# Patient Record
Sex: MALE | Race: WHITE | NOT HISPANIC OR LATINO | Employment: OTHER | URBAN - NONMETROPOLITAN AREA
[De-identification: names, ages, dates, MRNs, and addresses within clinical notes are randomized per-mention and may not be internally consistent; named-entity substitution may affect disease eponyms.]

---

## 2017-02-03 RX ORDER — GABAPENTIN 100 MG/1
CAPSULE ORAL
Qty: 90 CAP | Refills: 3 | Status: SHIPPED | OUTPATIENT
Start: 2017-02-03 | End: 2017-12-11 | Stop reason: SDUPTHER

## 2017-03-24 ENCOUNTER — OFFICE VISIT (OUTPATIENT)
Dept: MEDICAL GROUP | Facility: PHYSICIAN GROUP | Age: 62
End: 2017-03-24
Payer: COMMERCIAL

## 2017-03-24 VITALS
WEIGHT: 203 LBS | HEIGHT: 69 IN | TEMPERATURE: 97.9 F | OXYGEN SATURATION: 97 % | BODY MASS INDEX: 30.07 KG/M2 | SYSTOLIC BLOOD PRESSURE: 114 MMHG | HEART RATE: 72 BPM | RESPIRATION RATE: 14 BRPM | DIASTOLIC BLOOD PRESSURE: 76 MMHG

## 2017-03-24 DIAGNOSIS — F17.200 SMOKER: ICD-10-CM

## 2017-03-24 DIAGNOSIS — F17.200 TOBACCO USE DISORDER: ICD-10-CM

## 2017-03-24 DIAGNOSIS — M54.2 NECK PAIN: ICD-10-CM

## 2017-03-24 PROCEDURE — 99213 OFFICE O/P EST LOW 20 MIN: CPT | Performed by: NURSE PRACTITIONER

## 2017-03-24 RX ORDER — HYDROCODONE BITARTRATE AND ACETAMINOPHEN 5; 325 MG/1; MG/1
1 TABLET ORAL EVERY 6 HOURS PRN
Qty: 45 TAB | Refills: 0 | Status: SHIPPED | OUTPATIENT
Start: 2017-03-24 | End: 2017-10-30

## 2017-03-24 NOTE — ASSESSMENT & PLAN NOTE
Patient now has stopped smoking with the use Chantix. Then he backslided, and now wanting to have new RX.

## 2017-03-24 NOTE — MR AVS SNAPSHOT
"Mazin Wing   3/24/2017 2:40 PM   Office Visit   MRN: [id number]    Department:  [facility]   Dept Phone:  [phone]    Description:  Male : 1955   Provider:  ANALI Strange           Reason for Visit     Neck Pain radiating down left arm into elbow and hand until he sees Dr. Fernández      Allergies as of 3/24/2017     Allergen Noted Reactions    Nkda [No Known Drug Allergy] 2009       Other Environmental 2009       pollen      You were diagnosed with     Neck pain   [447813]       Smoker   [351306]       Tobacco use disorder   [305.1.ICD-9-CM]   Uncontrolled, referred to lung CT program      Vital Signs     Blood Pressure Pulse Temperature Respirations Height Weight    114/76 mmHg 72 36.6 °C (97.9 °F) 14 1.753 m (5' 9\") 92.08 kg (203 lb)    Body Mass Index Oxygen Saturation Smoking Status             29.96 kg/m2 97% Current Some Day Smoker         Basic Information     Date Of Birth Sex Race Ethnicity Preferred Language    1955 Male White Non- English      Problem List              ICD-10-CM Priority Class Noted - Resolved    Mixed hyperlipidemia E78.2   2009 - Present    Essential hypertension, benign I10   Unknown - Present    Osteoarthritis of multiple joints M15.9   Unknown - Present    Contact dermatitis and other eczema, due to unspecified cause L25.9   Unknown - Present    Esophageal reflux K21.9   Unknown - Present    Carpal tunnel syndrome G56.00   Unknown - Present    Loss of height R29.890   Unknown - Present    Tobacco use disorder F17.200   2009 - Present    Vitamin D deficiency (Chronic) E55.9   3/8/2010 - Present    Right ankle swelling M25.471   2014 - Present    Edema extremities R60.0   2014 - Present    Seborrheic keratoses, inflamed L82.0   2014 - Present    Osteoarthrosis involving lower leg M17.9   9/15/2014 - Present    BMI 32.0-32.9,adult Z68.32   2015 - Present    Visual disturbances H53.9   2015 - " Present    Peripheral neuropathy (CMS-HCC) G62.9   7/9/2015 - Present    Smoker F17.200   8/11/2015 - Present    Spells R68.89   8/31/2015 - Present    Nonintractable migraine G43.009   8/31/2015 - Present    Unsteady gait R26.81   8/31/2015 - Present    Right elbow tendinitis M77.8   2/29/2016 - Present    Neck pain M54.2   5/4/2016 - Present    Hair loss L65.9   9/29/2016 - Present    Abnormal chest x-ray with multiple lung nodules R91.8   10/10/2016 - Present    Pulmonary nodules R91.8   11/1/2016 - Present      Health Maintenance        Date Due Completion Dates    IMM PNEUMOCOCCAL 19-64 (ADULT) MEDIUM RISK SERIES (1 of 1 - PPSV23) 7/9/1974 ---    IMM ZOSTER VACCINE 7/9/2015 ---    IMM INFLUENZA (1) 9/1/2016 1/6/2014    IMM DTaP/Tdap/Td Vaccine (2 - Td) 5/16/2021 5/16/2011    COLONOSCOPY 7/20/2022 7/20/2012 (Prv Comp)    Override on 7/20/2012: Previously completed (multiple polyps, needs follow up in 1 year)            Current Immunizations     Influenza TIV (IM) 1/6/2014    Tdap Vaccine 5/16/2011  1:21 PM    Tetanus Vaccine 1/1/2001      Below and/or attached are the medications your provider expects you to take. Review all of your home medications and newly ordered medications with your provider and/or pharmacist. Follow medication instructions as directed by your provider and/or pharmacist. Please keep your medication list with you and share with your provider. Update the information when medications are discontinued, doses are changed, or new medications (including over-the-counter products) are added; and carry medication information at all times in the event of emergency situations     Allergies:  NKDA - (reactions not documented)     OTHER ENVIRONMENTAL - (reactions not documented)               Medications  Valid as of: March 24, 2017 -  3:00 PM    Generic Name Brand Name Tablet Size Instructions for use    Cholecalciferol (Cap) Cholecalciferol 2000 UNIT Take 3 Caps by mouth every bedtime. For vitamin D  deficiency        Clobetasol Propionate (Ointment) TEMOVATE 0.05 % APPLY TO AFFECTED AREA(S) 2 TIMES A DAY AS NEEDED. APPLY TO KNEES AS NEEDED        Diclofenac Sodium (Gel) Diclofenac Sodium 1 % Apply  to skin as directed. Apply twice a day as needed.        Diclofenac Sodium (Tablet Delayed Response) VOLTAREN 75 MG Take 1 Tab by mouth 2 times a day as needed.        Divalproex Sodium (TABLET SR 24 HR) DEPAKOTE  MG TAKE 1 TAB BY MOUTH 2 TIMES A DAY.        Gabapentin (Cap) NEURONTIN 100 MG TAKE 1 CAP BY MOUTH EVERY BEDTIME.        Hydrocodone-Acetaminophen (Tab) NORCO 5-325 MG Take 1-2 Tabs by mouth every 6 hours as needed.        Hydrocodone-Acetaminophen (Tab) NORCO 5-325 MG Take 1 Tab by mouth every 6 hours as needed (neck , arm pain).        Mupirocin (Ointment) BACTROBAN 2 % Apply 1 Application to affected area(s) 2 times a day.        RaNITidine HCl (Tab) ZANTAC 300 MG TAKE 1 TABLET BY MOUTH 2 TIMES A DAY.        Tamsulosin HCl (Cap) FLOMAX 0.4 MG Take 0.4 mg by mouth ONE-HALF HOUR AFTER BREAKFAST.        Varenicline Tartrate (Misc) CHANTIX JERI 0.5 MG X 11 & 1 MG X 42 Use as directed.        .                 Medicines prescribed today were sent to:     [facility] #[id number] - [place], [place] - [address] [address]    Phone: [phone] Fax: [phone]    Open 24 Hours?: No      Medication refill instructions:       If your prescription bottle indicates you have medication refills left, it is not necessary to call your provider’s office. Please contact your pharmacy and they will refill your medication.    If your prescription bottle indicates you do not have any refills left, you may request refills at any time through one of the following ways: The online Partigi system (except Urgent Care), by calling your provider’s office, or by asking your pharmacy to contact your provider’s office with a refill request. Medication refills are processed only during regular business  hours and may not be available until the next business day. Your provider may request additional information or to have a follow-up visit with you prior to refilling your medication.   *Please Note: Medication refills are assigned a new Rx number when refilled electronically. Your pharmacy may indicate that no refills were authorized even though a new prescription for the same medication is available at the pharmacy. Please request the medicine by name with the pharmacy before contacting your provider for a refill.        Other Notes About Your Plan     REMIND to ride bike.  Cervical radiculopathy orthopedics           EoeMobile Access Code: [email or web address]  Expires: 4/23/2017  3:00 PM    EoeMobile  A secure, online tool to manage your health information     [employer]’s EoeMobile® is a secure, online tool that connects you to your personalized health information from the privacy of your home -- day or night - making it very easy for you to manage your healthcare. Once the activation process is completed, you can even access your medical information using the EoeMobile rogelio, which is available for free in the Apple Rogelio store or Google Play store.     EoeMobile provides the following levels of access (as shown below):   My Chart Features   [facility] Doctor [facility]  Specialists [facility]  Urgent  Care Non-[facility]  Primary Care  Doctor   Email your healthcare team securely and privately 24/7 X X X    Manage appointments: schedule your next appointment; view details of past/upcoming appointments X      Request prescription refills. X      View recent personal medical records, including lab and immunizations X X X X   View health record, including health history, allergies, medications X X X X   Read reports about your outpatient visits, procedures, consult and ER notes X X X X   See your discharge summary, which is a recap of your hospital and/or ER visit that includes your diagnosis, lab results, and care plan. X X        How to register for Mybandstock:  1. Go to  [email or web address].  2. Click on the Sign Up Now box, which takes you to the New Member Sign Up page. You will need to provide the following information:  a. Enter your Mybandstock Access Code exactly as it appears at the top of this page. (You will not need to use this code after you’ve completed the sign-up process. If you do not sign up before the expiration date, you must request a new code.)   b. Enter your date of birth.   c. Enter your home email address.   d. Click Submit, and follow the next screen’s instructions.  3. Create a Mybandstock ID. This will be your Mybandstock login ID and cannot be changed, so think of one that is secure and easy to remember.  4. Create a Mybandstock password. You can change your password at any time.  5. Enter your Password Reset Question and Answer. This can be used at a later time if you forget your password.   6. Enter your e-mail address. This allows you to receive e-mail notifications when new information is available in Mybandstock.  7. Click Sign Up. You can now view your health information.    For assistance activating your Mybandstock account, call [phone]        Quit Tobacco Information     Do you want to quit using tobacco?    Quitting tobacco decreases risks of cancer, heart and lung disease, increases life expectancy, improves sense of taste and smell, and increases spending money, among other benefits.    If you are thinking about quitting, we can help.  • [facility] Quit Tobacco Program: [phone]  o Program occurs weekly for four weeks and includes pharmacist consultation on products to support quitting smoking or chewing tobacco. A provider referral is needed for pharmacist consultation.  • Tobacco Users Help Hotline: 4-338-QUIT-NOW (491-2899) or https://[place].quitlogix.org/  o Free, confidential telephone and online coaching for [place] residents. Sessions are designed on a schedule that is convenient for you. Eligible  clients receive free nicotine replacement therapy.  • Nationally: www.smokefree.gov  o Information and professional assistance to support both immediate and long-term needs as you become, and remain, a non-smoker. Smokefree.gov allows you to choose the help that best fits your needs.

## 2017-03-24 NOTE — ASSESSMENT & PLAN NOTE
This is a 61 year old with chronic pain to the neck. He was seeing [facility], then went to pain management where he got trigger point injections. He decided to see [facility] and has appointment in April 7, 2017. He his asking for pain medication to cover him until he has appointment. He has taken hydrocodone 5 mg /325 mg in the past. His pain is in his left arm, elbow, wrist. Patient uses voltaren gel for pain, but way over prescribed amount. Tylenol arthritis.

## 2017-03-27 NOTE — PROGRESS NOTES
Chief Complaint   Patient presents with   • Neck Pain     radiating down left arm into elbow and hand until he sees Dr. Fernández       HISTORY OF PRESENT ILLNESS: Patient is a @ age 61 here  today to discuss a ongoing problem. Will make follow up appointment to address other concerns.    Interval history:     Hospitalizations NO     Injuries -/ chronic neck - Pain management MD.    Illness- stressed started smoking again.         Neck pain  This is a 61 year old with chronic pain to the neck. He was seeing [facility], then went to pain management where he got trigger point injections. He decided to see [facility] and has appointment in April 7, 2017. He his asking for pain medication to cover him until he has appointment. He has taken hydrocodone 5 mg /325 mg in the past. His pain is in his left arm, elbow, wrist. Patient uses voltaren gel for pain, but way over prescribed amount. Tylenol arthritis.     Smoker  Patient now has stopped smoking with the use Chantix. Then he backslided, and now wanting to have new RX.         Allergies:Nkda and Other environmental    Current Outpatient Prescriptions Ordered in [facility]   Medication Sig Dispense Refill   • hydrocodone-acetaminophen (NORCO) 5-325 MG Tab per tablet Take 1 Tab by mouth every 6 hours as needed (neck , arm pain). 45 Tab 0   • varenicline (CHANTIX STARTING MONTH PAK) 0.5 MG X 11 & 1 MG X 42 tablet Use as directed. 56 Tab 0   • gabapentin (NEURONTIN) 100 MG Cap TAKE 1 CAP BY MOUTH EVERY BEDTIME. 90 Cap 3   • clobetasol (TEMOVATE) 0.05 % Ointment APPLY TO AFFECTED AREA(S) 2 TIMES A DAY AS NEEDED. APPLY TO KNEES AS NEEDED 60 g 3   • ranitidine (ZANTAC) 300 MG tablet TAKE 1 TABLET BY MOUTH 2 TIMES A DAY. 60 Tab 5   • divalproex ER (DEPAKOTE ER) 500 MG TABLET SR 24 HR TAKE 1 TAB BY MOUTH 2 TIMES A DAY. (Patient taking differently: take 1 tablet by mouth every day) 60 Tab 3   • diclofenac EC (VOLTAREN) 75 MG Tablet Delayed Response Take 1 Tab by mouth 2 times  a day as needed. 60 Tab 0   • Cholecalciferol ([employer]S VITAMIN D) 2000 UNIT CAPS Take 3 Caps by mouth every bedtime. For vitamin D deficiency (Patient taking differently: Take 2,000 Units by mouth every bedtime. For vitamin D deficiency) 60 Cap 11   • hydrocodone-acetaminophen (NORCO) 5-325 MG Tab per tablet Take 1-2 Tabs by mouth every 6 hours as needed. 15 Tab 0   • Diclofenac Sodium 1 % Gel Apply  to skin as directed. Apply twice a day as needed.     • tamsulosin (FLOMAX) 0.4 MG capsule Take 0.4 mg by mouth ONE-HALF HOUR AFTER BREAKFAST.     • mupirocin (BACTROBAN) 2 % OINT Apply 1 Application to affected area(s) 2 times a day. 1 Tube 1     No current Epic-ordered facility-administered medications on file.       Past Medical History   Diagnosis Date   • Mixed hyperlipidemia    • Prediabetes      uncontrolled   • Loss of height      Loss of 1.5 inches 2009,   • Epididymitis      chronic on left   • Tinnitus      chronic with hearing loss   • Contact dermatitis and other eczema, due to unspecified cause      Chronic, controlled with meds   • Carpal tunnel syndrome      bilateral, uncontrolled   • Osteoarthrosis involving, or with mention of more than one site, but not specified as generalized, multiple sites    • Esophageal reflux    • Anesthesia      wakes up agitated   • Arthritis    • Unspecified vitamin D deficiency 3/8/2010   • Snoring    • Edema extremities 8/22/2014   • Seborrheic keratoses, inflamed 8/22/2014   • Cholesterol blood decreased    • Heart burn    • Pain 9/3/14     left knee   • Visual disturbances 1/30/2015   • Chickenpox        Social History   Substance Use Topics   • Smoking status: Current Some Day Smoker -- 1.00 packs/day for 43 years     Types: Cigarettes   • Smokeless tobacco: Never Used      Comment: CHANTIX   • Alcohol Use: No       Family Status   Relation Status Death Age   • Mother Alive      healthy in her 70's   • Father Other      history unknown   • Daughter Alive      Family  "History   Problem Relation Age of Onset   • Cancer Mother    • Lung Cancer Mother    • Lung Disease     • Breast Cancer Daughter        ROS: As documented in my HPI      Exam:  Blood pressure 114/76, pulse 72, temperature 36.6 °C (97.9 °F), resp. rate 14, height 1.753 m (5' 9\"), weight 92.08 kg (203 lb), SpO2 97 %.  General:  Well nourished, well developed male in NAD  Head: Nontender scalp. No lesions  Neck: Decreased ROM left to right. Pain to cervical vertebrae  Pulmonary:  Normal effort.  Cardiovascular: Regular rate   Abdomen: Soft nontender, normal bowel sounds  Extremities: no clubbing, cyanosis, or edema.  Psych: Alert and oriented x3.  Neurological: No focal deficits    Please note that this dictation was created using voice recognition software. I have made every reasonable attempt to correct obvious errors, but I expect that there are errors of grammar and possibly content that I did not discover before finalizing the note.    Assessment/Plan:  1. Neck pain    Current outpatient prescriptions:   •  hydrocodone-acetaminophen, 1 Tab, Oral, Q6HRS PRN  #45   •  gabapentin, TAKE 1 CAP BY MOUTH EVERY BEDTIME., 3   2. Smoker     3. Tobacco use disorder  varenicline (CHANTIX STARTING MONTH PAK) 0.5 MG X 11 & 1 MG X 42 tablet                Patient given RX of Hydrocodone for neck pain - chronic with acute exacerbation. Has appointment with Dr. Fernández.    Return for routine visit and evaluation.  "

## 2017-03-31 ENCOUNTER — TELEPHONE (OUTPATIENT)
Dept: MEDICAL GROUP | Facility: PHYSICIAN GROUP | Age: 62
End: 2017-03-31

## 2017-03-31 DIAGNOSIS — M54.2 NECK PAIN: ICD-10-CM

## 2017-03-31 NOTE — TELEPHONE ENCOUNTER
Last visit patient stated he had appointment with [facility]. He did not request referral. I have place referral.  Marimar Bello  Let him know.  Marimar Bello

## 2017-05-01 ENCOUNTER — PATIENT OUTREACH (OUTPATIENT)
Dept: HEALTH INFORMATION MANAGEMENT | Facility: OTHER | Age: 62
End: 2017-05-01

## 2017-06-06 RX ORDER — VARENICLINE TARTRATE 1 MG/1
1 TABLET, FILM COATED ORAL 2 TIMES DAILY
Qty: 60 TAB | Refills: 1 | Status: SHIPPED | OUTPATIENT
Start: 2017-06-06 | End: 2017-12-11

## 2017-10-16 DIAGNOSIS — K21.9 GASTROESOPHAGEAL REFLUX DISEASE, ESOPHAGITIS PRESENCE NOT SPECIFIED: ICD-10-CM

## 2017-10-18 RX ORDER — RANITIDINE 300 MG/1
TABLET ORAL
Qty: 180 TAB | Refills: 1 | Status: SHIPPED | OUTPATIENT
Start: 2017-10-18 | End: 2017-12-11 | Stop reason: SDUPTHER

## 2017-10-30 ENCOUNTER — OFFICE VISIT (OUTPATIENT)
Dept: URGENT CARE | Facility: PHYSICIAN GROUP | Age: 62
End: 2017-10-30
Payer: COMMERCIAL

## 2017-10-30 VITALS
TEMPERATURE: 98.1 F | WEIGHT: 216 LBS | BODY MASS INDEX: 31.99 KG/M2 | SYSTOLIC BLOOD PRESSURE: 120 MMHG | HEART RATE: 62 BPM | RESPIRATION RATE: 16 BRPM | DIASTOLIC BLOOD PRESSURE: 80 MMHG | HEIGHT: 69 IN | OXYGEN SATURATION: 94 %

## 2017-10-30 DIAGNOSIS — J40 BRONCHITIS: ICD-10-CM

## 2017-10-30 PROCEDURE — 99214 OFFICE O/P EST MOD 30 MIN: CPT | Performed by: PHYSICIAN ASSISTANT

## 2017-10-30 RX ORDER — ALBUTEROL SULFATE 90 UG/1
2 AEROSOL, METERED RESPIRATORY (INHALATION) EVERY 6 HOURS PRN
Qty: 8.5 G | Refills: 1 | Status: SHIPPED | OUTPATIENT
Start: 2017-10-30 | End: 2020-12-17

## 2017-10-30 RX ORDER — IPRATROPIUM BROMIDE AND ALBUTEROL SULFATE 2.5; .5 MG/3ML; MG/3ML
3 SOLUTION RESPIRATORY (INHALATION) ONCE
Status: COMPLETED | OUTPATIENT
Start: 2017-10-30 | End: 2017-10-30

## 2017-10-30 RX ADMIN — IPRATROPIUM BROMIDE AND ALBUTEROL SULFATE 3 ML: 2.5; .5 SOLUTION RESPIRATORY (INHALATION) at 10:28

## 2017-10-30 ASSESSMENT — ENCOUNTER SYMPTOMS
FEVER: 0
SPUTUM PRODUCTION: 1
WHEEZING: 1
SORE THROAT: 0
CHILLS: 0
COUGH: 1
SHORTNESS OF BREATH: 1
HEMOPTYSIS: 0

## 2017-10-30 NOTE — PATIENT INSTRUCTIONS

## 2017-10-30 NOTE — PROGRESS NOTES
Subjective:      Mazin Wing is a 62 y.o. male who presents with Cough (x2wks )            Cough for 2 weeks with some shortness of breath, wheezing, and occasional sputum. No fevers, chills, or other complaints. Patient quit smoking about 3 weeks ago.      Cough   This is a new problem. The current episode started 1 to 4 weeks ago. The problem has been waxing and waning. Associated symptoms include shortness of breath and wheezing. Pertinent negatives include no chills, fever, hemoptysis, nasal congestion or sore throat. Nothing aggravates the symptoms. He has tried nothing for the symptoms. The treatment provided no relief.       Review of Systems   Constitutional: Negative for chills and fever.   HENT: Negative for congestion and sore throat.    Respiratory: Positive for cough, sputum production, shortness of breath and wheezing. Negative for hemoptysis.      Allergies:Erythromycin and Other environmental    Current Outpatient Prescriptions Ordered in [facility]   Medication Sig Dispense Refill   • albuterol 108 (90 Base) MCG/ACT Aero Soln inhalation aerosol Inhale 2 Puffs by mouth every 6 hours as needed for Shortness of Breath. 8.5 g 1   • ranitidine (ZANTAC) 300 MG tablet TAKE 1 TABLET BY MOUTH 2 TIMES A DAY. 180 Tab 1   • varenicline (CHANTIX STARTING MONTH PAK) 0.5 MG X 11 & 1 MG X 42 tablet Use as directed. 56 Tab 0   • gabapentin (NEURONTIN) 100 MG Cap TAKE 1 CAP BY MOUTH EVERY BEDTIME. 90 Cap 3   • clobetasol (TEMOVATE) 0.05 % Ointment APPLY TO AFFECTED AREA(S) 2 TIMES A DAY AS NEEDED. APPLY TO KNEES AS NEEDED 60 g 3   • tamsulosin (FLOMAX) 0.4 MG capsule Take 0.4 mg by mouth ONE-HALF HOUR AFTER BREAKFAST.     • mupirocin (BACTROBAN) 2 % OINT Apply 1 Application to affected area(s) 2 times a day. 1 Tube 1   • Cholecalciferol (CVS VITAMIN D) 2000 UNIT CAPS Take 3 Caps by mouth every bedtime. For vitamin D deficiency (Patient taking differently: Take 2,000 Units by mouth every bedtime. For vitamin D  "deficiency) 60 Cap 11   • varenicline (CHANTIX) 1 MG tablet Take 1 Tab by mouth 2 times a day. 60 Tab 1     No current Epic-ordered facility-administered medications on file.        Past Medical History:   Diagnosis Date   • Anesthesia     wakes up agitated   • Arthritis    • Carpal tunnel syndrome     bilateral, uncontrolled   • Chickenpox    • Cholesterol blood decreased    • Contact dermatitis and other eczema, due to unspecified cause     Chronic, controlled with meds   • Edema extremities 8/22/2014   • Epididymitis     chronic on left   • Esophageal reflux    • Heart burn    • Loss of height     Loss of 1.5 inches 2009,   • Mixed hyperlipidemia    • Osteoarthrosis involving, or with mention of more than one site, but not specified as generalized, multiple sites    • Pain 9/3/14    left knee   • Prediabetes     uncontrolled   • Seborrheic keratoses, inflamed 8/22/2014   • Snoring    • Tinnitus     chronic with hearing loss   • Unspecified vitamin D deficiency 3/8/2010   • Visual disturbances 1/30/2015       Social History   Substance Use Topics   • Smoking status: Former Smoker     Packs/day: 1.00     Years: 43.00     Types: Cigarettes     Quit date: 10/9/2017   • Smokeless tobacco: Never Used      Comment: CHANTIX   • Alcohol use No       Family Status   Relation Status   • Mother Alive    healthy in her 70's   • Father Other    history unknown   • Daughter Alive   •       Family History   Problem Relation Age of Onset   • Cancer Mother    • Lung Cancer Mother    • Breast Cancer Daughter    • Lung Disease            Objective:     /80   Pulse 62   Temp 36.7 °C (98.1 °F)   Resp 16   Ht 1.753 m (5' 9.02\")   Wt 98 kg (216 lb)   SpO2 94%   BMI 31.88 kg/m²      Physical Exam   Constitutional: He is oriented to person, place, and time. He appears well-developed and well-nourished. No distress.   HENT:   Head: Normocephalic and atraumatic.   Right Ear: External ear normal.   Left Ear: External ear normal. "   Mouth/Throat: Oropharynx is clear and moist.   Eyes: Right eye exhibits no discharge. Left eye exhibits no discharge.   Neck: Normal range of motion. Neck supple.   Cardiovascular: Normal rate and regular rhythm.    Pulmonary/Chest: Effort normal. He has no wheezes. He has no rales.   Breath sounds initially mildly diminished throughout, improved with DuoNeb. Occasional nonproductive cough. Patient reports significant improvement in breathing after DuoNeb   Neurological: He is alert and oriented to person, place, and time.   Skin: Skin is warm and dry. He is not diaphoretic.   Psychiatric: He has a normal mood and affect. His behavior is normal. Judgment and thought content normal.   Nursing note and vitals reviewed.              Assessment/Plan:     1. Bronchitis  ipratropium-albuterol (DUONEB) nebulizer solution 3 mL    albuterol 108 (90 Base) MCG/ACT Aero Soln inhalation aerosol    Cough with intermittent shortness of breath, chest tightness, and occasional sputum. Improved with DuoNeb. Given albuterol. Follow-up with PCP       [employer] Patient Education given:Bronchitis    Please note that this dictation was created using voice recognition software. I have made every reasonable attempt to correct obvious errors, but I expect that there are errors of grammar and possibly content that I did not discover before finalizing the note.

## 2017-11-06 ENCOUNTER — APPOINTMENT (OUTPATIENT)
Dept: RADIOLOGY | Facility: IMAGING CENTER | Age: 62
End: 2017-11-06
Attending: NURSE PRACTITIONER
Payer: COMMERCIAL

## 2017-11-06 ENCOUNTER — OFFICE VISIT (OUTPATIENT)
Dept: URGENT CARE | Facility: CLINIC | Age: 62
End: 2017-11-06
Payer: COMMERCIAL

## 2017-11-06 VITALS
OXYGEN SATURATION: 95 % | TEMPERATURE: 98.7 F | SYSTOLIC BLOOD PRESSURE: 122 MMHG | RESPIRATION RATE: 16 BRPM | WEIGHT: 222.2 LBS | DIASTOLIC BLOOD PRESSURE: 66 MMHG | BODY MASS INDEX: 32.91 KG/M2 | HEART RATE: 66 BPM | HEIGHT: 69 IN

## 2017-11-06 DIAGNOSIS — R06.02 SOB (SHORTNESS OF BREATH): ICD-10-CM

## 2017-11-06 DIAGNOSIS — J20.9 ACUTE BRONCHITIS, UNSPECIFIED ORGANISM: ICD-10-CM

## 2017-11-06 DIAGNOSIS — R05.8 PRODUCTIVE COUGH: ICD-10-CM

## 2017-11-06 DIAGNOSIS — R09.81 NASAL CONGESTION WITH RHINORRHEA: ICD-10-CM

## 2017-11-06 DIAGNOSIS — J34.89 NASAL CONGESTION WITH RHINORRHEA: ICD-10-CM

## 2017-11-06 PROCEDURE — 99214 OFFICE O/P EST MOD 30 MIN: CPT | Mod: 25 | Performed by: NURSE PRACTITIONER

## 2017-11-06 PROCEDURE — 94664 DEMO&/EVAL PT USE INHALER: CPT | Mod: 59 | Performed by: NURSE PRACTITIONER

## 2017-11-06 PROCEDURE — 94640 AIRWAY INHALATION TREATMENT: CPT | Performed by: NURSE PRACTITIONER

## 2017-11-06 PROCEDURE — 94760 N-INVAS EAR/PLS OXIMETRY 1: CPT | Performed by: NURSE PRACTITIONER

## 2017-11-06 PROCEDURE — 71020 DX-CHEST-2 VIEWS: CPT | Mod: TC | Performed by: NURSE PRACTITIONER

## 2017-11-06 RX ORDER — DOXYCYCLINE HYCLATE 100 MG
100 TABLET ORAL 2 TIMES DAILY
Qty: 20 TAB | Refills: 0 | Status: SHIPPED | OUTPATIENT
Start: 2017-11-06 | End: 2017-12-11

## 2017-11-06 RX ORDER — METHYLPREDNISOLONE 4 MG/1
TABLET ORAL
Qty: 1 KIT | Refills: 0 | Status: SHIPPED | OUTPATIENT
Start: 2017-11-06 | End: 2017-12-11

## 2017-11-06 RX ORDER — BENZONATATE 100 MG/1
100 CAPSULE ORAL 3 TIMES DAILY PRN
Qty: 60 CAP | Refills: 0 | Status: SHIPPED | OUTPATIENT
Start: 2017-11-06 | End: 2018-02-16

## 2017-11-06 RX ORDER — IPRATROPIUM BROMIDE AND ALBUTEROL SULFATE 2.5; .5 MG/3ML; MG/3ML
3 SOLUTION RESPIRATORY (INHALATION) ONCE
Status: COMPLETED | OUTPATIENT
Start: 2017-11-06 | End: 2017-11-06

## 2017-11-06 RX ORDER — FLUTICASONE PROPIONATE 50 MCG
2 SPRAY, SUSPENSION (ML) NASAL DAILY
Qty: 1 BOTTLE | Refills: 0 | Status: SHIPPED | OUTPATIENT
Start: 2017-11-06 | End: 2020-12-21

## 2017-11-06 RX ADMIN — IPRATROPIUM BROMIDE AND ALBUTEROL SULFATE 3 ML: 2.5; .5 SOLUTION RESPIRATORY (INHALATION) at 14:42

## 2017-11-06 ASSESSMENT — ENCOUNTER SYMPTOMS
BACK PAIN: 0
EYE REDNESS: 0
SPUTUM PRODUCTION: 1
WEAKNESS: 0
COUGH: 1
SORE THROAT: 0
FEVER: 0
VOMITING: 0
EYE DISCHARGE: 0
ABDOMINAL PAIN: 0
DIARRHEA: 0
DIZZINESS: 0
NAUSEA: 0
WHEEZING: 0
SHORTNESS OF BREATH: 1
HEADACHES: 0
ORTHOPNEA: 0
PALPITATIONS: 0
MYALGIAS: 0
CHILLS: 0
CONSTIPATION: 0

## 2017-11-06 NOTE — PROGRESS NOTES
Subjective:      Mazin Wing is a 62 y.o. male who presents with Cough (cough/ nasal congestion X 4 weeks )            HPI  Mazin is a 62 year old male who is here for cough x 4 weeks. Was seen in [facility] 1 week ago for same symptoms. Was given breathing treatment which helped and an albuterol inhaler to go home. Dx: bronchitis. Ex-smoker in last month. Taking Chantix. C/o productive cough with clear to dark yellow phlegm. C/o SOB, chest tightness, no wheeze. C/o nasal congestion with runny nose, PND and dry scratchy throat.     PMH:  has a past medical history of Anesthesia; Arthritis; Carpal tunnel syndrome; Chickenpox; Cholesterol blood decreased; Contact dermatitis and other eczema, due to unspecified cause; Edema extremities (8/22/2014); Epididymitis; Esophageal reflux; Heart burn; Loss of height; Mixed hyperlipidemia; Osteoarthrosis involving, or with mention of more than one site, but not specified as generalized, multiple sites; Pain (9/3/14); Prediabetes; Seborrheic keratoses, inflamed (8/22/2014); Snoring; Tinnitus; Unspecified vitamin D deficiency (3/8/2010); and Visual disturbances (1/30/2015).  MEDS:   Current Outpatient Prescriptions:   •  albuterol 108 (90 Base) MCG/ACT Aero Soln inhalation aerosol, Inhale 2 Puffs by mouth every 6 hours as needed for Shortness of Breath., Disp: 8.5 g, Rfl: 1  •  ranitidine (ZANTAC) 300 MG tablet, TAKE 1 TABLET BY MOUTH 2 TIMES A DAY., Disp: 180 Tab, Rfl: 1  •  varenicline (CHANTIX) 1 MG tablet, Take 1 Tab by mouth 2 times a day., Disp: 60 Tab, Rfl: 1  •  varenicline (CHANTIX STARTING MONTH PAK) 0.5 MG X 11 & 1 MG X 42 tablet, Use as directed., Disp: 56 Tab, Rfl: 0  •  gabapentin (NEURONTIN) 100 MG Cap, TAKE 1 CAP BY MOUTH EVERY BEDTIME., Disp: 90 Cap, Rfl: 3  •  clobetasol (TEMOVATE) 0.05 % Ointment, APPLY TO AFFECTED AREA(S) 2 TIMES A DAY AS NEEDED. APPLY TO KNEES AS NEEDED, Disp: 60 g, Rfl: 3  •  tamsulosin (FLOMAX) 0.4 MG capsule, Take 0.4 mg by mouth  ONE-HALF HOUR AFTER BREAKFAST., Disp: , Rfl:   •  mupirocin (BACTROBAN) 2 % OINT, Apply 1 Application to affected area(s) 2 times a day., Disp: 1 Tube, Rfl: 1  •  Cholecalciferol (CVS VITAMIN D) 2000 UNIT CAPS, Take 3 Caps by mouth every bedtime. For vitamin D deficiency (Patient taking differently: Take 2,000 Units by mouth every bedtime. For vitamin D deficiency), Disp: 60 Cap, Rfl: 11    Current Facility-Administered Medications:   •  ipratropium-albuterol (DUONEB) nebulizer solution 3 mL, 3 mL, Nebulization, Once, Sara Horta, F.N.P.  ALLERGIES:   Allergies   Allergen Reactions   • Erythromycin    • Other Environmental      pollen     SURGHX:   Past Surgical History:   Procedure Laterality Date   • KNEE ARTHROPLASTY TOTAL  9/15/2014    Performed by Cesar Abreu M.D. at SURGERY [facility] ORS   • KNEE ARTHROPLASTY TOTAL  3/7/2011    right   • CARPAL TUNNEL ENDOSCOPIC  12/1/2009    Performed by RONNIE MAURICIO at SURGERY [facility] ORS   • CERVICAL DISK AND FUSION ANTERIOR  1/26/2009    Performed by AMINA NERI at SURGERY [facility] ORS   • ARTHROSCOPY, KNEE  1989    right   • HEMORRHOIDECTOMY     • TOENAIL REMOVAL      bilateral great toes   • VASECTOMY       SOCHX:  reports that he quit smoking about 4 weeks ago. His smoking use included Cigarettes. He has a 43.00 pack-year smoking history. He has never used smokeless tobacco. He reports that he does not drink alcohol or use drugs.  FH: Family history was reviewed, no pertinent findings to report    Review of Systems   Constitutional: Negative for chills, fever and malaise/fatigue.   HENT: Positive for congestion. Negative for ear pain and sore throat.    Eyes: Negative for discharge and redness.   Respiratory: Positive for cough, sputum production and shortness of breath. Negative for wheezing.    Cardiovascular: Negative for chest pain, palpitations and orthopnea.   Gastrointestinal: Negative for abdominal pain, constipation, diarrhea,  "nausea and vomiting.   Musculoskeletal: Negative for back pain and myalgias.   Neurological: Negative for dizziness, weakness and headaches.   Endo/Heme/Allergies: Negative for environmental allergies.   All other systems reviewed and are negative.         Objective:     /66   Pulse 66   Temp 37.1 °C (98.7 °F)   Resp 16   Ht 1.753 m (5' 9\")   Wt 100.8 kg (222 lb 3.2 oz)   SpO2 95%   BMI 32.81 kg/m²      Physical Exam   Constitutional: He is oriented to person, place, and time. He appears well-developed and well-nourished. He is active and cooperative.  Non-toxic appearance. He does not have a sickly appearance. He does not appear ill. No distress.   HENT:   Head: Normocephalic.   Right Ear: External ear and ear canal normal. Tympanic membrane is injected.   Left Ear: External ear and ear canal normal. Tympanic membrane is injected.   Nose: Mucosal edema and rhinorrhea present. No sinus tenderness.   Mouth/Throat: Uvula is midline. Mucous membranes are dry. No uvula swelling. No posterior oropharyngeal edema or posterior oropharyngeal erythema.   Eyes: Conjunctivae and EOM are normal. Pupils are equal, round, and reactive to light.   Neck: Normal range of motion. Neck supple.   Cardiovascular: Normal rate and regular rhythm.    Pulmonary/Chest: Effort normal and breath sounds normal. No accessory muscle usage. No respiratory distress. He has no decreased breath sounds. He has no wheezes. He has no rhonchi. He has no rales.   Musculoskeletal: Normal range of motion.   Neurological: He is alert and oriented to person, place, and time.   Skin: Skin is warm and dry. He is not diaphoretic.   Vitals reviewed.  Duo Neb breathing treatment: Patient has chest tightness without SOB, wheeze or CP. Cough induced especially with deep breathing. After, patient states able to breathe deep without coughing. Air movement throughout. Post tx 98%.    CXR  FINDINGS:  Cardiomediastinal contour is within normal " limits.  Thoracic aorta is tortuous.  Nodular density projects over the periphery of the RIGHT midlung.  No focal pulmonary consolidation.  No pleural fluid collection or pneumothorax.  Degenerative change of thoracic spine.  Prior cervical fusion.               Assessment/Plan:     1. Productive cough    - ipratropium-albuterol (DUONEB) nebulizer solution 3 mL; 3 mL by Nebulization route Once.  - DX-CHEST-2 VIEWS; Future  - benzonatate (TESSALON) 100 MG Cap; Take 1 Cap by mouth 3 times a day as needed for Cough.  Dispense: 60 Cap; Refill: 0    2. SOB (shortness of breath)    - DX-CHEST-2 VIEWS; Future  - MethylPREDNISolone (MEDROL DOSEPAK) 4 MG Tablet Therapy Pack; Use as directed  Dispense: 1 Kit; Refill: 0    3. Nasal congestion with rhinorrhea    - fluticasone (FLONASE) 50 MCG/ACT nasal spray; Spray 2 Sprays in nose every day.  Dispense: 1 Bottle; Refill: 0    4. Acute bronchitis, unspecified organism    - doxycycline (VIBRAMYCIN) 100 MG Tab; Take 1 Tab by mouth 2 times a day.  Dispense: 20 Tab; Refill: 0  - MethylPREDNISolone (MEDROL DOSEPAK) 4 MG Tablet Therapy Pack; Use as directed  Dispense: 1 Kit; Refill: 0  - benzonatate (TESSALON) 100 MG Cap; Take 1 Cap by mouth 3 times a day as needed for Cough.  Dispense: 60 Cap; Refill: 0    Increase water intake  May use Ibuprofen/Tylenol prn for fever or body aches  Get rest  May use daily longer acting antihistamine prn  May use saline nasal spray prn to flush allergens   Use inhaler prn for SOB with cough  Monitor for fevers, productive cough, SOB, CP, chest tightness- need re-evaluation  Follow up with PCP or pulmonologist  (established with Dr. Cazares) prn

## 2017-11-07 ENCOUNTER — TELEPHONE (OUTPATIENT)
Dept: MEDICAL GROUP | Facility: PHYSICIAN GROUP | Age: 62
End: 2017-11-07

## 2017-11-15 DIAGNOSIS — H53.9 VISUAL DISTURBANCES: ICD-10-CM

## 2017-11-15 DIAGNOSIS — G43.001 MIGRAINE WITHOUT AURA AND WITH STATUS MIGRAINOSUS, NOT INTRACTABLE: ICD-10-CM

## 2017-11-16 RX ORDER — METHOCARBAMOL 750 MG/1
TABLET, FILM COATED ORAL
COMMUNITY
Start: 2017-09-23 | End: 2018-02-16

## 2017-11-16 RX ORDER — DIVALPROEX SODIUM 500 MG/1
500 TABLET, EXTENDED RELEASE ORAL 2 TIMES DAILY
Qty: 60 TAB | Refills: 0 | Status: SHIPPED | OUTPATIENT
Start: 2017-11-16 | End: 2017-12-11 | Stop reason: SDUPTHER

## 2017-11-16 RX ORDER — CEPHALEXIN 500 MG/1
CAPSULE ORAL
COMMUNITY
Start: 2017-08-31 | End: 2018-02-16

## 2017-11-16 RX ORDER — OXYCODONE HYDROCHLORIDE 10 MG/1
TABLET ORAL
COMMUNITY
Start: 2017-09-16 | End: 2017-12-11

## 2017-11-16 NOTE — TELEPHONE ENCOUNTER
Marimar- Pt has not been prescribed in some time and was recently d/c'd by urgent care. Possible compliance issues with this med. Please refill as you see fit.

## 2017-12-11 ENCOUNTER — OFFICE VISIT (OUTPATIENT)
Dept: MEDICAL GROUP | Facility: MEDICAL CENTER | Age: 62
End: 2017-12-11
Payer: COMMERCIAL

## 2017-12-11 VITALS
DIASTOLIC BLOOD PRESSURE: 76 MMHG | WEIGHT: 235.23 LBS | TEMPERATURE: 98.9 F | HEIGHT: 69 IN | RESPIRATION RATE: 12 BRPM | OXYGEN SATURATION: 96 % | SYSTOLIC BLOOD PRESSURE: 132 MMHG | HEART RATE: 69 BPM | BODY MASS INDEX: 34.84 KG/M2

## 2017-12-11 DIAGNOSIS — E78.2 MIXED HYPERLIPIDEMIA: ICD-10-CM

## 2017-12-11 DIAGNOSIS — L08.9 SKIN INFECTION: ICD-10-CM

## 2017-12-11 DIAGNOSIS — E66.9 OBESITY (BMI 30-39.9): ICD-10-CM

## 2017-12-11 DIAGNOSIS — E55.9 VITAMIN D DEFICIENCY DISEASE: ICD-10-CM

## 2017-12-11 DIAGNOSIS — H53.9 VISUAL DISTURBANCES: ICD-10-CM

## 2017-12-11 DIAGNOSIS — F17.200 TOBACCO USE DISORDER: ICD-10-CM

## 2017-12-11 DIAGNOSIS — L82.0 SEBORRHEIC KERATOSES, INFLAMED: ICD-10-CM

## 2017-12-11 DIAGNOSIS — I10 ESSENTIAL HYPERTENSION, BENIGN: ICD-10-CM

## 2017-12-11 DIAGNOSIS — K21.9 GASTROESOPHAGEAL REFLUX DISEASE, ESOPHAGITIS PRESENCE NOT SPECIFIED: ICD-10-CM

## 2017-12-11 DIAGNOSIS — G43.001 MIGRAINE WITHOUT AURA AND WITH STATUS MIGRAINOSUS, NOT INTRACTABLE: ICD-10-CM

## 2017-12-11 DIAGNOSIS — G62.9 PERIPHERAL POLYNEUROPATHY: ICD-10-CM

## 2017-12-11 PROCEDURE — 99214 OFFICE O/P EST MOD 30 MIN: CPT | Performed by: FAMILY MEDICINE

## 2017-12-11 RX ORDER — GABAPENTIN 100 MG/1
100 CAPSULE ORAL
Qty: 90 CAP | Refills: 3 | Status: SHIPPED | OUTPATIENT
Start: 2017-12-11 | End: 2018-12-28 | Stop reason: SDUPTHER

## 2017-12-11 RX ORDER — DIVALPROEX SODIUM 500 MG/1
500 TABLET, EXTENDED RELEASE ORAL 2 TIMES DAILY
Qty: 180 TAB | Refills: 3 | Status: SHIPPED | OUTPATIENT
Start: 2017-12-11 | End: 2018-12-15 | Stop reason: SDUPTHER

## 2017-12-11 RX ORDER — RANITIDINE 300 MG/1
TABLET ORAL
Qty: 180 TAB | Refills: 3 | Status: SHIPPED | OUTPATIENT
Start: 2017-12-11 | End: 2019-01-05 | Stop reason: SDUPTHER

## 2017-12-11 RX ORDER — CLOBETASOL PROPIONATE 0.5 MG/G
OINTMENT TOPICAL
Qty: 60 G | Refills: 3 | Status: SHIPPED | OUTPATIENT
Start: 2017-12-11 | End: 2019-09-16 | Stop reason: SDUPTHER

## 2017-12-11 ASSESSMENT — PATIENT HEALTH QUESTIONNAIRE - PHQ9: CLINICAL INTERPRETATION OF PHQ2 SCORE: 0

## 2017-12-11 NOTE — ASSESSMENT & PLAN NOTE
This is a chronic health problem that is uncontrolled with current medications and lifestyle measures. Patient is not on medication and will get blood work.

## 2017-12-11 NOTE — ASSESSMENT & PLAN NOTE
This is a chronic health issue for this patient that is now resolved with him utilizing Depakote on a twice a day basis. Patient will continue on this long-term. Prescription was written today.

## 2017-12-11 NOTE — ASSESSMENT & PLAN NOTE
This was a chronic issue for this patient but he has quit once again since the third week in September 2017. He has not smoked since that time. Luckily, he saw a friend who is very ill and continuing to smoke and contributing to his own illness. This may quite an impression on the patient.

## 2017-12-11 NOTE — ASSESSMENT & PLAN NOTE
This is a chronic health problem that is uncontrolled with current medications and lifestyle measures.  This is a chronic health issue for this patient to have gotten much worse because he has quit smoking. When he quit smoking he started utilizing peppermints for the hand to mouth syndrome. He is now cutting back on those. He has gained approximately 20 pounds. He is already started working on changing his dietary habits.

## 2017-12-11 NOTE — PROGRESS NOTES
CC: Follow-up from recent urgent care visit treated for bronchitis exacerbation  HISTORY OF PRESENT ILLNESS: Patient is a 62 y.o. male established patient who presents today to  follow-up on chronic health problems listed below and recent urgent care bronchitis exacerbation      Health Maintenance: Completed    Obesity (BMI 30-39.9)  This is a chronic health problem that is uncontrolled with current medications and lifestyle measures.  This is a chronic health issue for this patient to have gotten much worse because he has quit smoking. When he quit smoking he started utilizing peppermints for the hand to mouth syndrome. He is now cutting back on those. He has gained approximately 20 pounds. He is already started working on changing his dietary habits.    Tobacco use disorder  This was a chronic issue for this patient but he has quit once again since the third week in September 2017. He has not smoked since that time. Luckily, he saw a friend who is very ill and continuing to smoke and contributing to his own illness. This may quite an impression on the patient.    Visual disturbances  This is a chronic health issue for this patient that is now resolved with him utilizing Depakote on a twice a day basis. Patient will continue on this long-term. Prescription was written today.    Essential hypertension, benign  This is a chronic health problem that is well controlled with current medications and lifestyle measures. His BP today is good despite his weight gain.  His BP is good at 132/76.  We will check blood work and make sure his numbers are staying okay.    Mixed hyperlipidemia  This is a chronic health problem that is uncontrolled with current medications and lifestyle measures. Patient is not on medication and will get blood work.    Peripheral neuropathy  This is a chronic health problem for this patient which is adequately controlled with current dosing of gabapentin. He will continue that  long-term.      Patient Active Problem List    Diagnosis Date Noted   • Obesity (BMI 30-39.9) 12/11/2017   • Pulmonary nodules 11/01/2016   • Abnormal chest x-ray with multiple lung nodules 10/10/2016   • Hair loss 09/29/2016   • Neck pain 05/04/2016   • Right elbow tendinitis 02/29/2016   • Spells 08/31/2015   • Nonintractable migraine 08/31/2015   • Unsteady gait 08/31/2015   • Peripheral neuropathy (CMS-[facility]) 07/09/2015   • BMI 32.0-32.9,adult 01/30/2015   • Visual disturbances 01/30/2015   • Edema extremities 08/22/2014   • Seborrheic keratoses, inflamed 08/22/2014   • Right ankle swelling 04/02/2014   • Vitamin D deficiency 03/08/2010   • Tobacco use disorder 11/06/2009   • Essential hypertension, benign    • Osteoarthritis of multiple joints    • Contact dermatitis and other eczema, due to unspecified cause    • Esophageal reflux    • Carpal tunnel syndrome    • Loss of height    • Mixed hyperlipidemia 06/05/2009      Allergies:Erythromycin and Other environmental    Current Outpatient Prescriptions   Medication Sig Dispense Refill   • Cholecalciferol ([employer]S VITAMIN D) 2000 UNIT Cap Take 1 Cap by mouth every bedtime. For vitamin D deficiency 100 Cap 3   • clobetasol (TEMOVATE) 0.05 % Ointment APPLY TO AFFECTED AREA(S) 2 TIMES A DAY AS NEEDED. APPLY TO KNEES AS NEEDED 60 g 3   • divalproex ER (DEPAKOTE ER) 500 MG TABLET SR 24 HR Take 1 Tab by mouth 2 Times a Day. 180 Tab 3   • gabapentin (NEURONTIN) 100 MG Cap Take 1 Cap by mouth every bedtime. 90 Cap 3   • mupirocin (BACTROBAN) 2 % Ointment Apply 1 Application to affected area(s) 2 times a day. 1 Tube 3   • ranitidine (ZANTAC) 300 MG tablet TAKE 1 TABLET BY MOUTH 2 TIMES A DAY. 180 Tab 3   • albuterol 108 (90 Base) MCG/ACT Aero Soln inhalation aerosol Inhale 2 Puffs by mouth every 6 hours as needed for Shortness of Breath. 8.5 g 1   • tamsulosin (FLOMAX) 0.4 MG capsule Take 0.4 mg by mouth ONE-HALF HOUR AFTER BREAKFAST.     • cephALEXin (KEFLEX) 500 MG Cap      •  methocarbamol (ROBAXIN) 750 MG Tab      • fluticasone (FLONASE) 50 MCG/ACT nasal spray Spray 2 Sprays in nose every day. 1 Bottle 0   • benzonatate (TESSALON) 100 MG Cap Take 1 Cap by mouth 3 times a day as needed for Cough. 60 Cap 0     No current facility-administered medications for this visit.        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     - HEENT: Negative for headaches, vision changes, hearing changes, ear pain, ear discharge, rhinorrhea, sinus congestion, sore throat, and neck pain.      - Respiratory: Negative for cough, sputum production, chest congestion, dyspnea, wheezing, and crackles.      - Cardiovascular: Negative for chest pain, palpitations, orthopnea, and bilateral lower extremity edema.     - Gastrointestinal: Negative for heartburn, nausea, vomiting, abdominal pain, hematochezia, melena, diarrhea, constipation, and greasy/foul-smelling stools.     - Genitourinary: Negative for dysuria, polyuria, hematuria, pyuria, urinary urgency, and urinary incontinence.    - Musculoskeletal: Negative for myalgias, back pain, and joint pain.     - Skin: Negative for rash, itching, cyanotic skin color change.     - Neurological: Negative for dizziness, tingling, tremors, focal sensory deficit, focal weakness and headaches.     - Endo/Heme/Allergies: Does not bruise/bleed easily.     - Psychiatric/Behavioral: Negative for depression, suicidal/homicidal  "ideation and memory loss.        - NOTE: All other systems reviewed and are negative, except as in HPI.      Exam:    Blood pressure 132/76, pulse 69, temperature 37.2 °C (98.9 °F), resp. rate 12, height 1.753 m (5' 9\"), weight 106.7 kg (235 lb 3.7 oz), SpO2 96 %. Body mass index is 34.74 kg/m².    General:  Well nourished, well developed male in NAD  Head is grossly normal.  Neck: Supple without JVD or bruit. Thyroid is not enlarged.  Pulmonary: Clear to ausculation and percussion.  Normal effort. No rales, ronchi, or wheezing.  Cardiovascular: Regular rate and rhythm without murmur. Carotid and radial pulses are intact and equal bilaterally.  Extremities: no clubbing, cyanosis, or edema.    Please note that this dictation was created using voice recognition software. I have made every reasonable attempt to correct obvious errors, but I expect that there are errors of grammar and possibly content that I did not discover before finalizing the note.    Assessment/Plan:  1. Obesity (BMI 30-39.9)  Recently uncontrolled but patient now cutting back on food intake. Will monitor over 3 months  - Patient identified as having weight management issue.  Appropriate orders and counseling given.    2. Vitamin D deficiency disease  Unknown control, patient will get blood work in 3 months we will see him back  - Cholecalciferol (CVS VITAMIN D) 2000 UNIT Cap; Take 1 Cap by mouth every bedtime. For vitamin D deficiency  Dispense: 100 Cap; Refill: 3    3. Seborrheic keratoses, inflamed  Stable and controlled with current creams. We will get new prescriptions today  - clobetasol (TEMOVATE) 0.05 % Ointment; APPLY TO AFFECTED AREA(S) 2 TIMES A DAY AS NEEDED. APPLY TO KNEES AS NEEDED  Dispense: 60 g; Refill: 3    4. Visual disturbances  Stable, continue with Depakote  - divalproex ER (DEPAKOTE ER) 500 MG TABLET SR 24 HR; Take 1 Tab by mouth 2 Times a Day.  Dispense: 180 Tab; Refill: 3    5. Migraine without aura and with status " migrainosus, not intractable  Stable, continue with Depakote  - divalproex ER (DEPAKOTE ER) 500 MG TABLET SR 24 HR; Take 1 Tab by mouth 2 Times a Day.  Dispense: 180 Tab; Refill: 3    6. Skin infection  Stable, continue with Bactroban as needed  - mupirocin (BACTROBAN) 2 % Ointment; Apply 1 Application to affected area(s) 2 times a day.  Dispense: 1 Tube; Refill: 3    7. Gastroesophageal reflux disease, esophagitis presence not specified  Stable, continue with ranitidine as needed  - ranitidine (ZANTAC) 300 MG tablet; TAKE 1 TABLET BY MOUTH 2 TIMES A DAY.  Dispense: 180 Tab; Refill: 3    8. Tobacco use disorder  Patient now having quit in October and has remained off of tobacco. States that he is ready to stay off continually    9. Essential hypertension, benign  Controlled, continue with current meds and lifestyle.      10. Mixed hyperlipidemia  Unknown control, patient needs blood work and we will see him back to go over those results  - COMP METABOLIC PANEL; Future  - LIPID PROFILE; Future  - TSH; Future  - VITAMIN D,25 HYDROXY; Future    11. Peripheral polyneuropathy (CMS-HCC)  Controlled, continue with current meds and lifestyle.

## 2017-12-11 NOTE — ASSESSMENT & PLAN NOTE
This is a chronic health problem for this patient which is adequately controlled with current dosing of gabapentin. He will continue that long-term.

## 2017-12-11 NOTE — ASSESSMENT & PLAN NOTE
This is a chronic health problem that is well controlled with current medications and lifestyle measures. His BP today is good despite his weight gain.  His BP is good at 132/76.  We will check blood work and make sure his numbers are staying okay.

## 2017-12-19 ENCOUNTER — TELEPHONE (OUTPATIENT)
Dept: MEDICAL GROUP | Facility: MEDICAL CENTER | Age: 62
End: 2017-12-19

## 2017-12-19 DIAGNOSIS — L08.9 SKIN INFECTION: ICD-10-CM

## 2017-12-19 NOTE — TELEPHONE ENCOUNTER
I sent over a new prescription for Bactroban dispense as written ointment and sent to the pharmacy.

## 2017-12-19 NOTE — TELEPHONE ENCOUNTER
[facility] sent over a clarification fax regarding patient ointment. Pharmacy states it came over as Mupirocin 2% and the patient stated that it is for Bactroban. Please advise.

## 2018-01-06 ENCOUNTER — HOSPITAL ENCOUNTER (OUTPATIENT)
Dept: LAB | Facility: MEDICAL CENTER | Age: 63
End: 2018-01-06
Attending: FAMILY MEDICINE
Payer: COMMERCIAL

## 2018-01-06 DIAGNOSIS — E78.2 MIXED HYPERLIPIDEMIA: ICD-10-CM

## 2018-01-06 LAB
25(OH)D3 SERPL-MCNC: 35 NG/ML (ref 30–100)
ALBUMIN SERPL BCP-MCNC: 4.2 G/DL (ref 3.2–4.9)
ALBUMIN/GLOB SERPL: 1.4 G/DL
ALP SERPL-CCNC: 65 U/L (ref 30–99)
ALT SERPL-CCNC: 24 U/L (ref 2–50)
ANION GAP SERPL CALC-SCNC: 8 MMOL/L (ref 0–11.9)
AST SERPL-CCNC: 20 U/L (ref 12–45)
BILIRUB SERPL-MCNC: 0.7 MG/DL (ref 0.1–1.5)
BUN SERPL-MCNC: 20 MG/DL (ref 8–22)
CALCIUM SERPL-MCNC: 9.1 MG/DL (ref 8.5–10.5)
CHLORIDE SERPL-SCNC: 107 MMOL/L (ref 96–112)
CHOLEST SERPL-MCNC: 169 MG/DL (ref 100–199)
CO2 SERPL-SCNC: 25 MMOL/L (ref 20–33)
CREAT SERPL-MCNC: 0.82 MG/DL (ref 0.5–1.4)
GFR SERPL CREATININE-BSD FRML MDRD: >60 ML/MIN/1.73 M 2
GLOBULIN SER CALC-MCNC: 2.9 G/DL (ref 1.9–3.5)
GLUCOSE SERPL-MCNC: 103 MG/DL (ref 65–99)
HDLC SERPL-MCNC: 43 MG/DL
LDLC SERPL CALC-MCNC: 108 MG/DL
POTASSIUM SERPL-SCNC: 4.3 MMOL/L (ref 3.6–5.5)
PROT SERPL-MCNC: 7.1 G/DL (ref 6–8.2)
SODIUM SERPL-SCNC: 140 MMOL/L (ref 135–145)
TRIGL SERPL-MCNC: 91 MG/DL (ref 0–149)
TSH SERPL DL<=0.005 MIU/L-ACNC: 2.84 UIU/ML (ref 0.38–5.33)

## 2018-01-06 PROCEDURE — 80053 COMPREHEN METABOLIC PANEL: CPT

## 2018-01-06 PROCEDURE — 82306 VITAMIN D 25 HYDROXY: CPT

## 2018-01-06 PROCEDURE — 36415 COLL VENOUS BLD VENIPUNCTURE: CPT

## 2018-01-06 PROCEDURE — 80061 LIPID PANEL: CPT

## 2018-01-06 PROCEDURE — 84443 ASSAY THYROID STIM HORMONE: CPT

## 2018-01-09 ENCOUNTER — HOSPITAL ENCOUNTER (OUTPATIENT)
Dept: LAB | Facility: MEDICAL CENTER | Age: 63
End: 2018-01-09
Attending: UROLOGY
Payer: COMMERCIAL

## 2018-01-09 LAB — PSA SERPL-MCNC: 2.82 NG/ML (ref 0–4)

## 2018-01-09 PROCEDURE — 36415 COLL VENOUS BLD VENIPUNCTURE: CPT

## 2018-01-09 PROCEDURE — 84153 ASSAY OF PSA TOTAL: CPT

## 2018-02-13 ENCOUNTER — PATIENT OUTREACH (OUTPATIENT)
Dept: HEALTH INFORMATION MANAGEMENT | Facility: OTHER | Age: 63
End: 2018-02-13

## 2018-02-16 ENCOUNTER — OFFICE VISIT (OUTPATIENT)
Dept: MEDICAL GROUP | Facility: MEDICAL CENTER | Age: 63
End: 2018-02-16
Payer: COMMERCIAL

## 2018-02-16 VITALS
WEIGHT: 235 LBS | HEIGHT: 69 IN | OXYGEN SATURATION: 96 % | TEMPERATURE: 98 F | HEART RATE: 80 BPM | RESPIRATION RATE: 16 BRPM | SYSTOLIC BLOOD PRESSURE: 118 MMHG | BODY MASS INDEX: 34.8 KG/M2 | DIASTOLIC BLOOD PRESSURE: 70 MMHG

## 2018-02-16 DIAGNOSIS — R07.81 RIB PAIN ON LEFT SIDE: ICD-10-CM

## 2018-02-16 DIAGNOSIS — F17.200 TOBACCO DEPENDENCE: ICD-10-CM

## 2018-02-16 PROCEDURE — 99213 OFFICE O/P EST LOW 20 MIN: CPT | Performed by: PHYSICIAN ASSISTANT
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
## 2018-02-16 NOTE — ASSESSMENT & PLAN NOTE
Unfortunately he started smoking again after stopping for 5 months in 2 weeks. He is now in the process of stopping again.
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
## 2018-03-09 RX ORDER — VARENICLINE TARTRATE 1 MG/1
1 TABLET, FILM COATED ORAL 2 TIMES DAILY
Refills: 0 | OUTPATIENT
Start: 2018-03-09

## 2018-03-12 ENCOUNTER — APPOINTMENT (OUTPATIENT)
Dept: MEDICAL GROUP | Facility: MEDICAL CENTER | Age: 63
End: 2018-03-12
Payer: COMMERCIAL

## 2018-08-30 ENCOUNTER — PATIENT OUTREACH (OUTPATIENT)
Dept: OTHER | Facility: MEDICAL CENTER | Age: 63
End: 2018-08-30

## 2018-12-15 DIAGNOSIS — H53.9 VISUAL DISTURBANCES: ICD-10-CM

## 2018-12-15 DIAGNOSIS — G43.001 MIGRAINE WITHOUT AURA AND WITH STATUS MIGRAINOSUS, NOT INTRACTABLE: ICD-10-CM

## 2018-12-17 RX ORDER — DIVALPROEX SODIUM 500 MG/1
500 TABLET, EXTENDED RELEASE ORAL 2 TIMES DAILY
Qty: 180 TAB | Refills: 3 | Status: SHIPPED | OUTPATIENT
Start: 2018-12-17 | End: 2020-01-22

## 2019-02-11 ENCOUNTER — APPOINTMENT (OUTPATIENT)
Dept: MEDICAL GROUP | Facility: MEDICAL CENTER | Age: 64
End: 2019-02-11
Payer: COMMERCIAL

## 2019-02-12 ENCOUNTER — HOSPITAL ENCOUNTER (OUTPATIENT)
Dept: LAB | Facility: MEDICAL CENTER | Age: 64
End: 2019-02-12
Attending: UROLOGY
Payer: COMMERCIAL

## 2019-02-12 LAB — PSA SERPL-MCNC: 4.43 NG/ML (ref 0–4)

## 2019-02-12 PROCEDURE — 36415 COLL VENOUS BLD VENIPUNCTURE: CPT

## 2019-02-12 PROCEDURE — 84153 ASSAY OF PSA TOTAL: CPT

## 2019-02-25 ENCOUNTER — OFFICE VISIT (OUTPATIENT)
Dept: MEDICAL GROUP | Facility: MEDICAL CENTER | Age: 64
End: 2019-02-25
Payer: COMMERCIAL

## 2019-02-25 VITALS
RESPIRATION RATE: 14 BRPM | TEMPERATURE: 97.9 F | SYSTOLIC BLOOD PRESSURE: 100 MMHG | HEIGHT: 69 IN | HEART RATE: 56 BPM | WEIGHT: 212 LBS | DIASTOLIC BLOOD PRESSURE: 70 MMHG | BODY MASS INDEX: 31.4 KG/M2 | OXYGEN SATURATION: 96 %

## 2019-02-25 DIAGNOSIS — E78.2 MIXED HYPERLIPIDEMIA: ICD-10-CM

## 2019-02-25 DIAGNOSIS — R97.20 ELEVATED PSA, LESS THAN 10 NG/ML: ICD-10-CM

## 2019-02-25 DIAGNOSIS — F17.200 TOBACCO DEPENDENCE: ICD-10-CM

## 2019-02-25 DIAGNOSIS — I10 ESSENTIAL HYPERTENSION, BENIGN: ICD-10-CM

## 2019-02-25 PROCEDURE — 99214 OFFICE O/P EST MOD 30 MIN: CPT | Performed by: FAMILY MEDICINE

## 2019-02-25 RX ORDER — VARENICLINE TARTRATE 1 MG/1
1 TABLET, FILM COATED ORAL 2 TIMES DAILY
Qty: 60 TAB | Refills: 3 | Status: SHIPPED | OUTPATIENT
Start: 2019-02-25 | End: 2019-02-25 | Stop reason: SDUPTHER

## 2019-02-25 RX ORDER — VARENICLINE TARTRATE 1 MG/1
1 TABLET, FILM COATED ORAL 2 TIMES DAILY
Qty: 60 TAB | Refills: 3 | Status: SHIPPED | OUTPATIENT
Start: 2019-02-25 | End: 2019-07-30 | Stop reason: SDUPTHER

## 2019-02-25 ASSESSMENT — PATIENT HEALTH QUESTIONNAIRE - PHQ9: CLINICAL INTERPRETATION OF PHQ2 SCORE: 0

## 2019-02-25 NOTE — ASSESSMENT & PLAN NOTE
This is a chronic health problem that is well controlled with current medications and lifestyle measures.  Pt's PSA is at 4.43 and he is following in Urology with Dr. Ambrose in [place].

## 2019-02-25 NOTE — ASSESSMENT & PLAN NOTE
Pt has loist nearly 50 lbs and is hoping that his lipids are back down in the normal range.  We will see him back in 2 months to be certain he is doing well.

## 2019-02-25 NOTE — ASSESSMENT & PLAN NOTE
This is a chronic health problem that is well controlled with current  lifestyle measures.  He has lost weight and doing very well with his BP and probably his cholesterol.

## 2019-02-25 NOTE — ASSESSMENT & PLAN NOTE
This is a chronic problem and he has tried multiple times to quit and has quit for a times, but always gone back.  Patient is currently smoking about 1/2 pack/day.  He has in the past used Chantix and been able to quit smoking but then goes off the Chantix and goes back to smoking.  This study showed that the more times he could tries to quit the more likely he is to quit.  He really would like to have a prescription for Chantix at this time.  We will be happy to do that for him.  He has a plan for quitting and how he is going to not regain his weight even though he is quitting smoking.

## 2019-02-26 NOTE — PROGRESS NOTES
CC:Diagnoses of Elevated PSA, less than 10 ng/ml, Tobacco dependence, Essential hypertension, benign, and Mixed hyperlipidemia were pertinent to this visit.    HISTORY OF PRESENT ILLNESS: Patient is a 63 y.o. male established patient who presents today to follow up on current health problems and discuss his desire to stop smoking.      Health Maintenance: Completed    Elevated PSA, less than 10 ng/ml  This is a chronic health problem that is well controlled with current medications and lifestyle measures.  Pt's PSA is at 4.43 and he is following in Urology with Dr. Ambrose in [place].    Tobacco dependence  This is a chronic problem and he has tried multiple times to quit and has quit for a times, but always gone back.  Patient is currently smoking about 1/2 pack/day.  He has in the past used Chantix and been able to quit smoking but then goes off the Chantix and goes back to smoking.  This study showed that the more times he could tries to quit the more likely he is to quit.  He really would like to have a prescription for Chantix at this time.  We will be happy to do that for him.  He has a plan for quitting and how he is going to not regain his weight even though he is quitting smoking.    Essential hypertension, benign  This is a chronic health problem that is well controlled with current  lifestyle measures.  He has lost weight and doing very well with his BP and probably his cholesterol.      Mixed hyperlipidemia  Pt has loist nearly 50 lbs and is hoping that his lipids are back down in the normal range.  We will see him back in 2 months to be certain he is doing well.      Patient Active Problem List    Diagnosis Date Noted   • Elevated PSA, less than 10 ng/ml 02/25/2019   • Rib pain on left side 02/16/2018   • Obesity (BMI 30-39.9) 12/11/2017   • Pulmonary nodules 11/01/2016   • Abnormal chest x-ray with multiple lung nodules 10/10/2016   • Hair loss 09/29/2016   • Neck pain 05/04/2016   • Right elbow tendinitis  02/29/2016   • Nonintractable migraine 08/31/2015   • Peripheral neuropathy 07/09/2015   • BMI 32.0-32.9,adult 01/30/2015   • Visual disturbances 01/30/2015   • Edema extremities 08/22/2014   • Seborrheic keratoses, inflamed 08/22/2014   • Right ankle swelling 04/02/2014   • Vitamin D deficiency 03/08/2010   • Tobacco dependence 11/06/2009   • Essential hypertension, benign    • Osteoarthritis of multiple joints    • Contact dermatitis and other eczema, due to unspecified cause    • Esophageal reflux    • Carpal tunnel syndrome    • Loss of height    • Mixed hyperlipidemia 06/05/2009      Allergies:Erythromycin and Other environmental    Current Outpatient Prescriptions   Medication Sig Dispense Refill   • varenicline (CHANTIX STARTING MONTH JERI) 0.5 MG X 11 & 1 MG X 42 tablet Use as directed 56 Tab 0   • varenicline (CHANTIX CONTINUING MONTH PAK) 1 MG tablet Take 1 Tab by mouth 2 times a day. 60 Tab 3   • ranitidine (ZANTAC) 300 MG tablet TAKE 1 TABLET BY MOUTH 2 TIMES A DAY. 180 Tab 3   • gabapentin (NEURONTIN) 100 MG Cap TAKE 1 CAP BY MOUTH EVERY BEDTIME. 90 Cap 2   • divalproex ER (DEPAKOTE ER) 500 MG TABLET SR 24 HR TAKE 1 TAB BY MOUTH 2 TIMES A DAY. 180 Tab 3   • BACTROBAN 2 % Ointment Apply 1 Application to affected area(s) 2 times a day. 60 g 3   • Cholecalciferol ([facility]S VITAMIN D) 2000 UNIT Cap Take 1 Cap by mouth every bedtime. For vitamin D deficiency 100 Cap 3   • clobetasol (TEMOVATE) 0.05 % Ointment APPLY TO AFFECTED AREA(S) 2 TIMES A DAY AS NEEDED. APPLY TO KNEES AS NEEDED 60 g 3   • fluticasone (FLONASE) 50 MCG/ACT nasal spray Spray 2 Sprays in nose every day. 1 Bottle 0   • albuterol 108 (90 Base) MCG/ACT Aero Soln inhalation aerosol Inhale 2 Puffs by mouth every 6 hours as needed for Shortness of Breath. 8.5 g 1   • tamsulosin (FLOMAX) 0.4 MG capsule Take 0.4 mg by mouth ONE-HALF HOUR AFTER BREAKFAST.       No current facility-administered medications for this visit.        Social History   Substance  Use Topics   • Smoking status: Current Every Day Smoker     Packs/day: 1.00     Years: 43.00     Types: Cigarettes     Last attempt to quit: 10/9/2017   • Smokeless tobacco: Never Used      Comment: Quit for 5 mos, 2 week but returned.   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    - HEENT:  Negative for headaches, vision changes, hearing changes, ear pain, ear discharge, rhinorrhea, sinus congestion, sore throat, and neck pain.      - Respiratory: Negative for cough, sputum production, chest congestion, dyspnea, wheezing, and crackles.      - Cardiovascular: Negative for chest pain, palpitations, orthopnea, and bilateral lower extremity edema.     - Gastrointestinal: Negative for heartburn, nausea, vomiting, abdominal pain, hematochezia, melena, diarrhea, constipation, and greasy/foul-smelling stools.     - Genitourinary: Negative for dysuria, polyuria, hematuria, pyuria, urinary urgency, and urinary incontinence.     - Musculoskeletal:positive for shoulder and neck pain.      - Skin: Negative for rash, itching, cyanotic skin color change.     - Neurological: Negative for dizziness, tingling, tremors, focal sensory deficit, focal weakness and headaches.     - Endo/Heme/Allergies: Does not bruise/bleed easily.     - Psychiatric/Behavioral: Negative for depression, suicidal/homicidal ideation and memory loss.          - NOTE: All other systems reviewed and are negative, except as in HPI.      Exam:    Blood pressure 100/70, pulse (!) 56, temperature 36.6 °C (97.9 °F), temperature source Temporal, resp. rate 14, height 1.753 m (5'  "9\"), weight 96.2 kg (212 lb), SpO2 96 %. Body mass index is 31.31 kg/m².    General:  Well nourished, well developed male in NAD  Head is grossly normal.  Neck: Supple without JVD or bruit. Thyroid is not enlarged.  Pulmonary: Clear to ausculation and percussion.  Normal effort. No rales, ronchi, or wheezing.  Cardiovascular: Regular rate and rhythm without murmur. Carotid and radial pulses are intact and equal bilaterally.  Extremities: no clubbing, cyanosis, or edema.  Patient was seen for 25 minutes face to face of which, 20 minutes was spent counseling regarding lab results, plans for follow up, recent lifestyle changes and plans for the future..    Please note that this dictation was created using voice recognition software. I have made every reasonable attempt to correct obvious errors, but I expect that there are errors of grammar and possibly content that I did not discover before finalizing the note.    Assessment/Plan:  1. Elevated PSA, less than 10 ng/ml  Stable and being followed up by Dr. Ambrose    2. Tobacco dependence  Uncontrolled, patient wanting to quit,  Will use Chantix    3. Essential hypertension, benign  Controlled, continue with current lifestyle.      4. Mixed hyperlipidemia  Controlled with current lifestyle, will check labs and see the patient back to discuss results.  - Comp Metabolic Panel; Future  - Lipid Profile; Future         "

## 2019-03-20 ENCOUNTER — TELEPHONE (OUTPATIENT)
Dept: HEALTH INFORMATION MANAGEMENT | Facility: OTHER | Age: 64
End: 2019-03-20

## 2019-03-20 NOTE — TELEPHONE ENCOUNTER
Outcome: Left Message with wife     Please transfer to Patient Outreach Team at [phone] when patient returns call.    Attempt # 1

## 2019-03-27 NOTE — TELEPHONE ENCOUNTER
Outcome: Left Message    Please transfer to Patient Outreach Team at [phone] when patient returns call.    Attempt # 2

## 2019-04-09 ENCOUNTER — HOSPITAL ENCOUNTER (OUTPATIENT)
Dept: LAB | Facility: MEDICAL CENTER | Age: 64
End: 2019-04-09
Attending: UROLOGY
Payer: COMMERCIAL

## 2019-04-09 ENCOUNTER — HOSPITAL ENCOUNTER (OUTPATIENT)
Dept: LAB | Facility: MEDICAL CENTER | Age: 64
End: 2019-04-09
Attending: FAMILY MEDICINE
Payer: COMMERCIAL

## 2019-04-09 DIAGNOSIS — E78.2 MIXED HYPERLIPIDEMIA: ICD-10-CM

## 2019-04-09 LAB
ALBUMIN SERPL BCP-MCNC: 3.8 G/DL (ref 3.2–4.9)
ALBUMIN/GLOB SERPL: 1.7 G/DL
ALP SERPL-CCNC: 58 U/L (ref 30–99)
ALT SERPL-CCNC: 11 U/L (ref 2–50)
ANION GAP SERPL CALC-SCNC: 6 MMOL/L (ref 0–11.9)
AST SERPL-CCNC: 14 U/L (ref 12–45)
BILIRUB SERPL-MCNC: 0.4 MG/DL (ref 0.1–1.5)
BUN SERPL-MCNC: 14 MG/DL (ref 8–22)
CALCIUM SERPL-MCNC: 8.6 MG/DL (ref 8.5–10.5)
CHLORIDE SERPL-SCNC: 108 MMOL/L (ref 96–112)
CHOLEST SERPL-MCNC: 139 MG/DL (ref 100–199)
CO2 SERPL-SCNC: 28 MMOL/L (ref 20–33)
CREAT SERPL-MCNC: 0.89 MG/DL (ref 0.5–1.4)
FASTING STATUS PATIENT QL REPORTED: NORMAL
GLOBULIN SER CALC-MCNC: 2.3 G/DL (ref 1.9–3.5)
GLUCOSE SERPL-MCNC: 109 MG/DL (ref 65–99)
HDLC SERPL-MCNC: 43 MG/DL
LDLC SERPL CALC-MCNC: 86 MG/DL
POTASSIUM SERPL-SCNC: 4.2 MMOL/L (ref 3.6–5.5)
PROT SERPL-MCNC: 6.1 G/DL (ref 6–8.2)
PSA SERPL-MCNC: 3.25 NG/ML (ref 0–4)
SODIUM SERPL-SCNC: 142 MMOL/L (ref 135–145)
TRIGL SERPL-MCNC: 52 MG/DL (ref 0–149)

## 2019-04-09 PROCEDURE — 36415 COLL VENOUS BLD VENIPUNCTURE: CPT

## 2019-04-09 PROCEDURE — 84153 ASSAY OF PSA TOTAL: CPT

## 2019-04-09 PROCEDURE — 80061 LIPID PANEL: CPT

## 2019-04-09 PROCEDURE — 80053 COMPREHEN METABOLIC PANEL: CPT

## 2019-04-22 ENCOUNTER — OFFICE VISIT (OUTPATIENT)
Dept: MEDICAL GROUP | Facility: MEDICAL CENTER | Age: 64
End: 2019-04-22
Payer: COMMERCIAL

## 2019-04-22 VITALS
DIASTOLIC BLOOD PRESSURE: 70 MMHG | TEMPERATURE: 98.3 F | HEART RATE: 86 BPM | BODY MASS INDEX: 31.67 KG/M2 | HEIGHT: 69 IN | SYSTOLIC BLOOD PRESSURE: 110 MMHG | OXYGEN SATURATION: 98 % | RESPIRATION RATE: 14 BRPM | WEIGHT: 213.85 LBS

## 2019-04-22 DIAGNOSIS — E78.2 MIXED HYPERLIPIDEMIA: ICD-10-CM

## 2019-04-22 DIAGNOSIS — I10 ESSENTIAL HYPERTENSION, BENIGN: ICD-10-CM

## 2019-04-22 DIAGNOSIS — R73.03 PREDIABETES: ICD-10-CM

## 2019-04-22 DIAGNOSIS — F17.200 TOBACCO DEPENDENCE: ICD-10-CM

## 2019-04-22 PROCEDURE — 99214 OFFICE O/P EST MOD 30 MIN: CPT | Performed by: FAMILY MEDICINE

## 2019-04-22 NOTE — ASSESSMENT & PLAN NOTE
This is a chronic health problem that is well controlled with current medications and lifestyle measures.  His BP is excellent at 110/70 with medications.  The patient denies chest pain, shortness of breath or dyspnea on exertion.

## 2019-04-22 NOTE — ASSESSMENT & PLAN NOTE
This is a chronic health problem where his blood sugar is back up at 109.He is working on quitting smoking that has been very difficult.  He will continue to work on lifestyle and tried to get this under control.

## 2019-04-22 NOTE — PROGRESS NOTES
CC:Diagnoses of Tobacco dependence, Essential hypertension, benign, Prediabetes, and Mixed hyperlipidemia were pertinent to this visit.    HISTORY OF PRESENT ILLNESS: Patient is a 63 y.o. male established patient who presents today to talk about his having quit smoking, now cigarettes free since 3/3/2019.  Continues with Chantix.  Also would like to go over his blood work in chronic health problems.    Health Maintenance: Completed    Tobacco dependence  Is a chronic health problem for this patient where he has been able to quit smoking with utilizing the Chantix.  He is been very faithful in taking the Chantix and its been a full month since he had his last cigarette.  He also has made changes in his lifestyle so that times when he used to smoke he has different habits now to take at that time.  He also finds himself feeling  nauseated when he is around his son after his son has smoked.      Essential hypertension, benign  This is a chronic health problem that is well controlled with current medications and lifestyle measures.  His BP is excellent at 110/70 with medications.  The patient denies chest pain, shortness of breath or dyspnea on exertion.      Prediabetes  This is a chronic health problem where his blood sugar is back up at 109.He is working on quitting smoking that has been very difficult.  He will continue to work on lifestyle and tried to get this under control.    Mixed hyperlipidemia  This is a chronic health problem that is well controlled with current lifestyle measures.  Not  His total cholesterol is good at 139, triglycerides good at 52, HDL excellent at 43 and his LDL came down from 108 to a level of 86.  Patient will continue with lifestyle management.  He is not on a cholesterol-lowering med.  We will plan to recheck this in 6 months.      Patient Active Problem List    Diagnosis Date Noted   • Elevated PSA, less than 10 ng/ml 02/25/2019   • Rib pain on left side 02/16/2018   • Obesity (BMI  30-39.9) 12/11/2017   • Pulmonary nodules 11/01/2016   • Abnormal chest x-ray with multiple lung nodules 10/10/2016   • Hair loss 09/29/2016   • Neck pain 05/04/2016   • Right elbow tendinitis 02/29/2016   • Nonintractable migraine 08/31/2015   • Peripheral neuropathy 07/09/2015   • BMI 32.0-32.9,adult 01/30/2015   • Visual disturbances 01/30/2015   • Edema extremities 08/22/2014   • Seborrheic keratoses, inflamed 08/22/2014   • Right ankle swelling 04/02/2014   • Vitamin D deficiency 03/08/2010   • Tobacco dependence 11/06/2009   • Essential hypertension, benign    • Osteoarthritis of multiple joints    • Contact dermatitis and other eczema, due to unspecified cause    • Esophageal reflux    • Carpal tunnel syndrome    • Prediabetes    • Loss of height    • Mixed hyperlipidemia 06/05/2009      Allergies:Erythromycin and Other environmental    Current Outpatient Prescriptions   Medication Sig Dispense Refill   • varenicline (CHANTIX CONTINUING MONTH JERI) 1 MG tablet Take 1 Tab by mouth 2 times a day. 60 Tab 3   • ranitidine (ZANTAC) 300 MG tablet TAKE 1 TABLET BY MOUTH 2 TIMES A DAY. 180 Tab 3   • gabapentin (NEURONTIN) 100 MG Cap TAKE 1 CAP BY MOUTH EVERY BEDTIME. 90 Cap 2   • divalproex ER (DEPAKOTE ER) 500 MG TABLET SR 24 HR TAKE 1 TAB BY MOUTH 2 TIMES A DAY. 180 Tab 3   • BACTROBAN 2 % Ointment Apply 1 Application to affected area(s) 2 times a day. 60 g 3   • Cholecalciferol ([facility]S VITAMIN D) 2000 UNIT Cap Take 1 Cap by mouth every bedtime. For vitamin D deficiency 100 Cap 3   • clobetasol (TEMOVATE) 0.05 % Ointment APPLY TO AFFECTED AREA(S) 2 TIMES A DAY AS NEEDED. APPLY TO KNEES AS NEEDED 60 g 3   • fluticasone (FLONASE) 50 MCG/ACT nasal spray Spray 2 Sprays in nose every day. 1 Bottle 0   • albuterol 108 (90 Base) MCG/ACT Aero Soln inhalation aerosol Inhale 2 Puffs by mouth every 6 hours as needed for Shortness of Breath. 8.5 g 1   • tamsulosin (FLOMAX) 0.4 MG capsule Take 0.4 mg by mouth ONE-HALF HOUR AFTER  BREAKFAST.       No current facility-administered medications for this visit.        Social History   Substance Use Topics   • Smoking status: Former Smoker     Packs/day: 1.00     Years: 43.00     Types: Cigarettes     Quit date: 3/3/2019   • Smokeless tobacco: Never Used      Comment: Quit for 5 mos, 2 week but returned.   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    - HEENT:  Negative for headaches, vision changes, hearing changes, ear pain, ear discharge, rhinorrhea, sinus congestion, sore throat, and neck pain.      - Respiratory: Negative for cough, sputum production, chest congestion, dyspnea, wheezing, and crackles.      - Cardiovascular: Negative for chest pain, palpitations, orthopnea, and bilateral lower extremity edema.     - Gastrointestinal: Negative for heartburn, nausea, vomiting, abdominal pain, hematochezia, melena, diarrhea, constipation, and greasy/foul-smelling stools.     - Genitourinary: Negative for dysuria, polyuria, hematuria, pyuria, urinary urgency, and urinary incontinence.     - Musculoskeletal: Negative for myalgias, back pain, and joint pain.     - Skin: Negative for rash, itching, cyanotic skin color change.     - Neurological: Negative for dizziness, tingling, tremors, focal sensory deficit, focal weakness and headaches.     - Endo/Heme/Allergies: Does not bruise/bleed easily.     - Psychiatric/Behavioral: Negative for depression, suicidal/homicidal ideation and memory loss.          - NOTE: All other systems reviewed and are negative, except as in HPI.      Exam:    /70 (BP Location: Left  "arm, Patient Position: Sitting, BP Cuff Size: Adult)   Pulse 86   Temp 36.8 °C (98.3 °F) (Temporal)   Resp 14   Ht 1.753 m (5' 9\")   Wt 97 kg (213 lb 13.5 oz)   SpO2 98%  Body mass index is 31.58 kg/m².    General:  Well nourished, well developed male in NAD    LABS: 4/9/2019: Results reviewed and discussed with the patient, questions answered.    Please note that this dictation was created using voice recognition software. I have made every reasonable attempt to correct obvious errors, but I expect that there are errors of grammar and possibly content that I did not discover before finalizing the note.    Assessment/Plan:  1. Tobacco dependence  Now abstinent since 3/3/2019.  Patient continuing with Chantix.  When he completes the original prescriptions that will be 5 months.  I told him that most people take at least 6 months on Chantix.  He will let me know if he needs the 6-month.  We will plan to see him back in the next 6 months.    2. Essential hypertension, benign  Controlled, continue with current meds and lifestyle.      3. Prediabetes  Uncontrolled, patient realizes that he is using candies to try and help with not smoking.  He will start to cut back now that he is more than a month out from having quit completely.  We will plan to recheck in 6 months  - HEMOGLOBIN A1C; Future    4. Mixed hyperlipidemia  Adequately controlled with lifestyle management.  We will recheck in 6 months  - Comp Metabolic Panel; Future  - Lipid Profile; Future         "

## 2019-04-22 NOTE — ASSESSMENT & PLAN NOTE
This is a chronic health problem that is well controlled with current lifestyle measures.  Not  His total cholesterol is good at 139, triglycerides good at 52, HDL excellent at 43 and his LDL came down from 108 to a level of 86.  Patient will continue with lifestyle management.  He is not on a cholesterol-lowering med.  We will plan to recheck this in 6 months.

## 2019-04-22 NOTE — ASSESSMENT & PLAN NOTE
Is a chronic health problem for this patient where he has been able to quit smoking with utilizing the Chantix.  He is been very faithful in taking the Chantix and its been a full month since he had his last cigarette.  He also has made changes in his lifestyle so that times when he used to smoke he has different habits now to take at that time.  He also finds himself feeling  nauseated when he is around his son after his son has smoked.

## 2019-05-21 ENCOUNTER — TELEPHONE (OUTPATIENT)
Dept: HEALTH INFORMATION MANAGEMENT | Facility: OTHER | Age: 64
End: 2019-05-21

## 2019-05-28 NOTE — TELEPHONE ENCOUNTER
Outcome: Left Message    Please transfer to Patient Outreach Team at [phone] when patient returns call.    Attempt # 2

## 2019-06-24 NOTE — TELEPHONE ENCOUNTER
Outcome: Left Message    Please transfer to Patient Outreach Team at [phone] when patient returns call.    LUNG CANCER SCREENING June 2019    Attempt # 1

## 2019-07-05 NOTE — TELEPHONE ENCOUNTER
Outcome: Left Message    Please transfer to Patient Outreach Team at [phone] when patient returns call.    Attempt # 2 June 2019

## 2019-07-09 ENCOUNTER — APPOINTMENT (OUTPATIENT)
Dept: MEDICAL GROUP | Facility: MEDICAL CENTER | Age: 64
End: 2019-07-09
Payer: COMMERCIAL

## 2019-07-17 ENCOUNTER — HOSPITAL ENCOUNTER (OUTPATIENT)
Dept: LAB | Facility: MEDICAL CENTER | Age: 64
End: 2019-07-17
Attending: FAMILY MEDICINE
Payer: COMMERCIAL

## 2019-07-17 DIAGNOSIS — R73.03 PREDIABETES: ICD-10-CM

## 2019-07-17 DIAGNOSIS — E78.2 MIXED HYPERLIPIDEMIA: ICD-10-CM

## 2019-07-17 LAB
ALBUMIN SERPL BCP-MCNC: 3.8 G/DL (ref 3.2–4.9)
ALBUMIN/GLOB SERPL: 1.7 G/DL
ALP SERPL-CCNC: 49 U/L (ref 30–99)
ALT SERPL-CCNC: 14 U/L (ref 2–50)
ANION GAP SERPL CALC-SCNC: 8 MMOL/L (ref 0–11.9)
AST SERPL-CCNC: 14 U/L (ref 12–45)
BILIRUB SERPL-MCNC: 0.4 MG/DL (ref 0.1–1.5)
BUN SERPL-MCNC: 17 MG/DL (ref 8–22)
CALCIUM SERPL-MCNC: 8.4 MG/DL (ref 8.5–10.5)
CHLORIDE SERPL-SCNC: 106 MMOL/L (ref 96–112)
CHOLEST SERPL-MCNC: 158 MG/DL (ref 100–199)
CO2 SERPL-SCNC: 27 MMOL/L (ref 20–33)
CREAT SERPL-MCNC: 1.05 MG/DL (ref 0.5–1.4)
EST. AVERAGE GLUCOSE BLD GHB EST-MCNC: 117 MG/DL
FASTING STATUS PATIENT QL REPORTED: NORMAL
GLOBULIN SER CALC-MCNC: 2.3 G/DL (ref 1.9–3.5)
GLUCOSE SERPL-MCNC: 96 MG/DL (ref 65–99)
HBA1C MFR BLD: 5.7 % (ref 0–5.6)
HDLC SERPL-MCNC: 43 MG/DL
LDLC SERPL CALC-MCNC: 89 MG/DL
POTASSIUM SERPL-SCNC: 3.7 MMOL/L (ref 3.6–5.5)
PROT SERPL-MCNC: 6.1 G/DL (ref 6–8.2)
SODIUM SERPL-SCNC: 141 MMOL/L (ref 135–145)
TRIGL SERPL-MCNC: 130 MG/DL (ref 0–149)

## 2019-07-17 PROCEDURE — 36415 COLL VENOUS BLD VENIPUNCTURE: CPT

## 2019-07-17 PROCEDURE — 83036 HEMOGLOBIN GLYCOSYLATED A1C: CPT

## 2019-07-17 PROCEDURE — 80053 COMPREHEN METABOLIC PANEL: CPT

## 2019-07-17 PROCEDURE — 80061 LIPID PANEL: CPT

## 2019-07-30 ENCOUNTER — OFFICE VISIT (OUTPATIENT)
Dept: MEDICAL GROUP | Facility: MEDICAL CENTER | Age: 64
End: 2019-07-30
Payer: COMMERCIAL

## 2019-07-30 VITALS
HEART RATE: 59 BPM | RESPIRATION RATE: 12 BRPM | HEIGHT: 69 IN | SYSTOLIC BLOOD PRESSURE: 110 MMHG | DIASTOLIC BLOOD PRESSURE: 78 MMHG | TEMPERATURE: 98.2 F | WEIGHT: 224.65 LBS | OXYGEN SATURATION: 95 % | BODY MASS INDEX: 33.27 KG/M2

## 2019-07-30 DIAGNOSIS — L81.9 ATYPICAL PIGMENTED SKIN LESION: ICD-10-CM

## 2019-07-30 DIAGNOSIS — R73.03 PREDIABETES: ICD-10-CM

## 2019-07-30 DIAGNOSIS — I10 ESSENTIAL HYPERTENSION, BENIGN: ICD-10-CM

## 2019-07-30 DIAGNOSIS — L82.0 SEBORRHEIC KERATOSES, INFLAMED: ICD-10-CM

## 2019-07-30 DIAGNOSIS — F17.200 TOBACCO DEPENDENCE: ICD-10-CM

## 2019-07-30 PROCEDURE — 17110 DESTRUCTION B9 LES UP TO 14: CPT | Performed by: FAMILY MEDICINE

## 2019-07-30 PROCEDURE — 99214 OFFICE O/P EST MOD 30 MIN: CPT | Mod: 25 | Performed by: FAMILY MEDICINE

## 2019-07-30 RX ORDER — VARENICLINE TARTRATE 1 MG/1
1 TABLET, FILM COATED ORAL 2 TIMES DAILY
Qty: 60 TAB | Refills: 3 | Status: SHIPPED | OUTPATIENT
Start: 2019-07-30 | End: 2020-08-21 | Stop reason: SDUPTHER

## 2019-07-30 NOTE — ASSESSMENT & PLAN NOTE
This patient has done a great job with not going back to smoking with stressors.  He is still taking Chantix once a day.  He will continue for this as long as he needs.

## 2019-07-30 NOTE — LETTER
July 30, 2019        Mazin Wing  [address] [place] [id number]        To whom it may concern:    My patient listed above has a spouse who has a serious cardiac condition.  This gentleman needs to keep his phone with him at all times because if his wife has a cardiac event he needs to be able to go help her immediately.  Please allow him to carry his phone.    If you have any questions or concerns, please don't hesitate to call.        Sincerely,        Rashida Madsen M.D.

## 2019-07-30 NOTE — ASSESSMENT & PLAN NOTE
These are new problems were the patient notes that he never had lesions up in his hairline but now he has a a red papule with a central ulceration that is just shown up in the last week that almost in the midline on his hair line as well as another lesion that is in the hairline at his right temple.  Both of them are concerning for squamous cell cancers.  I am going to have him see dermatology and they can remove those and send them for pathology.

## 2019-07-30 NOTE — ASSESSMENT & PLAN NOTE
Pt had one lesion on his low back that we froze today.  Patient tolerated the procedure without any difficulty.  Patient has had other seborrheic keratoses that have been inflamed and irritated by clothing.  This appears to be irritated by his waistband.

## 2019-07-30 NOTE — PROGRESS NOTES
CC:Diagnoses of BMI 32.0-32.9,adult, Prediabetes, Atypical pigmented skin lesion, Essential hypertension, benign, Tobacco dependence, and Seborrheic keratoses, inflamed were pertinent to this visit.    HISTORY OF PRESENT ILLNESS: Patient is a 64 y.o. male established patient who presents today to talk about his chronic health problems as outlined below.  Patient has an insurance that is no longer going to be accepted at [facility] within 48 hours.  He needs a refill of his Chantix so that he does not run out while this is being straightened out.    Health Maintenance: Completed    BMI 32.0-32.9,adult  This is a chronic health problem for this patient that recently his wife had a very serious cardiac event which really scared the patient and he was trying to decide between going back to smoking versus eating his anxiety away.  He chose eating and has gained 11 pounds.  His wife is doing very well now and he is going to get back on track    Prediabetes  This is a chronic health problem that is well controlled with current medications and lifestyle measures. His glucose is down to 96 and his A1c is excellent at 5.7.  Patient is going to work on losing 11 pounds he is gained since we last saw him and that we will keep his prediabetes under control.    Atypical pigmented skin lesion  These are new problems were the patient notes that he never had lesions up in his hairline but now he has a a red papule with a central ulceration that is just shown up in the last week that almost in the midline on his hair line as well as another lesion that is in the hairline at his right temple.  Both of them are concerning for squamous cell cancers.  I am going to have him see dermatology and they can remove those and send them for pathology.    Essential hypertension, benign  This is a chronic health problem that is well controlled with current medications and lifestyle measures. His blood pressure is great 110/78.  The patient denies  chest pain, shortness of breath or dyspnea on exertion.      Tobacco dependence  This patient has done a great job with not going back to smoking with stressors.  He is still taking Chantix once a day.  He will continue for this as long as he needs.    Seborrheic keratoses, inflamed  Pt had one lesion on his low back that we froze today.  Patient tolerated the procedure without any difficulty.  Patient has had other seborrheic keratoses that have been inflamed and irritated by clothing.  This appears to be irritated by his waistband.      Patient Active Problem List    Diagnosis Date Noted   • Atypical pigmented skin lesion 07/30/2019   • Elevated PSA, less than 10 ng/ml 02/25/2019   • Rib pain on left side 02/16/2018   • Obesity (BMI 30-39.9) 12/11/2017   • Pulmonary nodules 11/01/2016   • Abnormal chest x-ray with multiple lung nodules 10/10/2016   • Hair loss 09/29/2016   • Neck pain 05/04/2016   • Right elbow tendinitis 02/29/2016   • Nonintractable migraine 08/31/2015   • Peripheral neuropathy 07/09/2015   • BMI 32.0-32.9,adult 01/30/2015   • Visual disturbances 01/30/2015   • Edema extremities 08/22/2014   • Seborrheic keratoses, inflamed 08/22/2014   • Right ankle swelling 04/02/2014   • Vitamin D deficiency 03/08/2010   • Tobacco dependence 11/06/2009   • Essential hypertension, benign    • Osteoarthritis of multiple joints    • Contact dermatitis and other eczema, due to unspecified cause    • Esophageal reflux    • Carpal tunnel syndrome    • Prediabetes    • Loss of height    • Mixed hyperlipidemia 06/05/2009      Allergies:Erythromycin and Other environmental    Current Outpatient Prescriptions   Medication Sig Dispense Refill   • varenicline (CHANTIX CONTINUING MONTH JERI) 1 MG tablet Take 1 Tab by mouth 2 times a day. 60 Tab 3   • ranitidine (ZANTAC) 300 MG tablet TAKE 1 TABLET BY MOUTH 2 TIMES A DAY. 180 Tab 3   • gabapentin (NEURONTIN) 100 MG Cap TAKE 1 CAP BY MOUTH EVERY BEDTIME. 90 Cap 2   •  divalproex ER (DEPAKOTE ER) 500 MG TABLET SR 24 HR TAKE 1 TAB BY MOUTH 2 TIMES A DAY. 180 Tab 3   • BACTROBAN 2 % Ointment Apply 1 Application to affected area(s) 2 times a day. 60 g 3   • Cholecalciferol ([employer] VITAMIN D) 2000 UNIT Cap Take 1 Cap by mouth every bedtime. For vitamin D deficiency 100 Cap 3   • clobetasol (TEMOVATE) 0.05 % Ointment APPLY TO AFFECTED AREA(S) 2 TIMES A DAY AS NEEDED. APPLY TO KNEES AS NEEDED 60 g 3   • fluticasone (FLONASE) 50 MCG/ACT nasal spray Spray 2 Sprays in nose every day. 1 Bottle 0   • albuterol 108 (90 Base) MCG/ACT Aero Soln inhalation aerosol Inhale 2 Puffs by mouth every 6 hours as needed for Shortness of Breath. 8.5 g 1   • tamsulosin (FLOMAX) 0.4 MG capsule Take 0.4 mg by mouth ONE-HALF HOUR AFTER BREAKFAST.       No current facility-administered medications for this visit.        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    - HEENT:  Negative for headaches, vision changes, hearing changes, ear pain, ear discharge, rhinorrhea, sinus congestion, sore throat, and neck pain.      - Respiratory: Negative for cough, sputum production, chest congestion, dyspnea, wheezing, and crackles.      - Cardiovascular: Negative for chest pain, palpitations, orthopnea, and bilateral lower extremity edema.     - Gastrointestinal: Negative for heartburn, nausea, vomiting, abdominal pain, hematochezia, melena, diarrhea,  "constipation, and greasy/foul-smelling stools.     - Genitourinary: Negative for dysuria, polyuria, hematuria, pyuria, urinary urgency, and urinary incontinence.     - Musculoskeletal: Negative for myalgias, back pain, and joint pain.     - Skin: Negative for rash, itching, cyanotic skin color change.     - Neurological: Negative for dizziness, tingling, tremors, focal sensory deficit, focal weakness and headaches.     - Endo/Heme/Allergies: Does not bruise/bleed easily.     - Psychiatric/Behavioral: Negative for depression, suicidal/homicidal ideation and memory loss.          - NOTE: All other systems reviewed and are negative, except as in HPI.      Exam:    /78 (BP Location: Left arm, Patient Position: Sitting, BP Cuff Size: Adult)   Pulse (!) 59   Temp 36.8 °C (98.2 °F) (Temporal)   Resp 12   Ht 1.753 m (5' 9\")   Wt 101.9 kg (224 lb 10.4 oz)   SpO2 95%  Body mass index is 33.17 kg/m².    General:  Well nourished, well developed male in NAD  Head is grossly normal.  Skin: Patient has an inflamed seborrheic keratoses in the right low back right at the waistband.  He also has a red pearly lesion in his hairline approximately 1 cm over from his part and then a second pearly lesion at the right temple.  LABS: 7/17/2019: Results reviewed and discussed with the patient, questions answered.    Please note that this dictation was created using voice recognition software. I have made every reasonable attempt to correct obvious errors, but I expect that there are errors of grammar and possibly content that I did not discover before finalizing the note.    Assessment/Plan:  1. BMI 32.0-32.9,adult  Patient working on losing 11 pounds he gained while his wife was in the hospital and he was so worried about her.  He knows what he needs to do.    2. Prediabetes  Well-controlled with lifestyle management    3. Atypical pigmented skin lesion  Uncontrolled, patient's wife will make him an appointment to be seen by " Randee for evaluation and treatment.    4. Essential hypertension, benign  Controlled, continue with current meds and lifestyle.      5. Tobacco dependence  Patient continues to be abstinent from smoking but requiring Chantix.  We renewed for the coming 6 months.    6. Seborrheic keratoses, inflamed  Uncontrolled and treated with cryocautery secondary to being inflamed by his clothing.    SUBJECTIVE:   Mazin Wing is a 64 y.o. male who presents for lesion removal. We have already discussed this procedure, including option of not performing surgery, technique of surgery and potential for scarring at a recent visit.    OBJECTIVE:   Patient appears well. Vitals are normal.  Skin: 2 lesions that appear to be squamous cell cancers in the hairline that we will leave for dermatology.  1 small inflamed seborrheic keratoses at the right low back with obvious excoriation from clothing.    ASSESSMENT:   normal complete skin exam, no suspicious lesions, seborrheic keratosis    PLAN:   cryotherapy with liquid nitrogen was performed. Antibiotic dressing is applied, and wound care instructions provided.  Be alert for any signs of cutaneous infection. The procedure was well tolerated without complications. Follow up: the patient may return prn.

## 2019-07-30 NOTE — ASSESSMENT & PLAN NOTE
This is a chronic health problem that is well controlled with current medications and lifestyle measures. His glucose is down to 96 and his A1c is excellent at 5.7.  Patient is going to work on losing 11 pounds he is gained since we last saw him and that we will keep his prediabetes under control.

## 2019-07-30 NOTE — ASSESSMENT & PLAN NOTE
This is a chronic health problem that is well controlled with current medications and lifestyle measures. His blood pressure is great 110/78.  The patient denies chest pain, shortness of breath or dyspnea on exertion.

## 2019-07-30 NOTE — ASSESSMENT & PLAN NOTE
This is a chronic health problem for this patient that recently his wife had a very serious cardiac event which really scared the patient and he was trying to decide between going back to smoking versus eating his anxiety away.  He chose eating and has gained 11 pounds.  His wife is doing very well now and he is going to get back on track

## 2019-09-04 ENCOUNTER — PATIENT OUTREACH (OUTPATIENT)
Dept: HEALTH INFORMATION MANAGEMENT | Facility: OTHER | Age: 64
End: 2019-09-04

## 2019-09-05 NOTE — PROGRESS NOTES
Outcome: Left Message    Please transfer to Patient Outreach Team at [phone] when patient returns call.    ATTEMPT #1   CT [facility] SCREENING 8/2019

## 2019-09-16 DIAGNOSIS — L82.0 SEBORRHEIC KERATOSES, INFLAMED: ICD-10-CM

## 2019-09-16 RX ORDER — CLOBETASOL PROPIONATE 0.5 MG/G
OINTMENT TOPICAL
Qty: 60 G | Refills: 3 | Status: SHIPPED | OUTPATIENT
Start: 2019-09-16 | End: 2020-12-21

## 2019-10-03 NOTE — PROGRESS NOTES
Outcome: Left Message    Please transfer to Patient Outreach Team at [phone] when patient returns call.    Attempt # 1

## 2019-10-14 ENCOUNTER — APPOINTMENT (OUTPATIENT)
Dept: MEDICAL GROUP | Facility: MEDICAL CENTER | Age: 64
End: 2019-10-14
Payer: COMMERCIAL

## 2019-11-18 RX ORDER — GABAPENTIN 100 MG/1
100 CAPSULE ORAL
Qty: 90 CAP | Refills: 2 | Status: SHIPPED | OUTPATIENT
Start: 2019-11-18 | End: 2020-09-28 | Stop reason: SDUPTHER

## 2020-01-20 ENCOUNTER — OFFICE VISIT (OUTPATIENT)
Dept: MEDICAL GROUP | Facility: MEDICAL CENTER | Age: 65
End: 2020-01-20
Payer: COMMERCIAL

## 2020-01-20 VITALS
BODY MASS INDEX: 35.04 KG/M2 | HEART RATE: 66 BPM | TEMPERATURE: 97.3 F | DIASTOLIC BLOOD PRESSURE: 72 MMHG | OXYGEN SATURATION: 96 % | WEIGHT: 236.55 LBS | SYSTOLIC BLOOD PRESSURE: 110 MMHG | HEIGHT: 69 IN | RESPIRATION RATE: 12 BRPM

## 2020-01-20 DIAGNOSIS — R41.3 SHORT-TERM MEMORY LOSS: ICD-10-CM

## 2020-01-20 DIAGNOSIS — M67.449 DIGITAL MUCINOUS CYST: ICD-10-CM

## 2020-01-20 PROCEDURE — 99214 OFFICE O/P EST MOD 30 MIN: CPT | Mod: 25 | Performed by: FAMILY MEDICINE

## 2020-01-20 PROCEDURE — 17000 DESTRUCT PREMALG LESION: CPT | Performed by: FAMILY MEDICINE

## 2020-01-20 ASSESSMENT — PATIENT HEALTH QUESTIONNAIRE - PHQ9: CLINICAL INTERPRETATION OF PHQ2 SCORE: 0

## 2020-01-20 NOTE — ASSESSMENT & PLAN NOTE
"This is a new problem where he is starting to show some problems with short term memory loss. His wife who accompanies him is noting the problems.  He describes himself as being in a \"funk\"  We need to get some labs and check to see if this is physiologic.  "

## 2020-01-20 NOTE — ASSESSMENT & PLAN NOTE
This patient has had problems with a digital mucinous cyst that is come and gone on the first finger left hand at the DIP joint over a number of years.  It comes and goes on its own.  Now it is been present for a while.  He states that there is times where it soft and other times where it is hard.  He would like to just get it gone.  We will freeze it for him today.  It should then scab over and dry up.

## 2020-01-20 NOTE — PROGRESS NOTES
"CC:Diagnoses of Digital mucinous cyst and Short-term memory loss were pertinent to this visit.    HISTORY OF PRESENT ILLNESS: Patient is a 64 y.o. male established patient who presents today to treat a mucinous cyst at the present on the first finger of left hand.  This is the first time we have identified the problem as well.    Patient is accompanied by his wife who tells me that he is having problems with short-term memory loss and we spent a great deal of time discussing this and trying to help the patient understand how this could be occurring without him being cognizant.      Digital mucinous cyst  This patient has had problems with a digital mucinous cyst that is come and gone on the first finger left hand at the DIP joint over a number of years.  It comes and goes on its own.  Now it is been present for a while.  He states that there is times where it soft and other times where it is hard.  He would like to just get it gone.  We will freeze it for him today.  It should then scab over and dry up.    Short-term memory loss  This is a new problem where he is starting to show some problems with short term memory loss. His wife who accompanies him is noting the problems.  He describes himself as being in a \"funk\"  We need to get some labs and check to see if this is physiologic.      Patient Active Problem List    Diagnosis Date Noted   • Digital mucinous cyst 01/20/2020   • Short-term memory loss 01/20/2020   • Atypical pigmented skin lesion 07/30/2019   • Elevated PSA, less than 10 ng/ml 02/25/2019   • Rib pain on left side 02/16/2018   • Obesity (BMI 30-39.9) 12/11/2017   • Pulmonary nodules 11/01/2016   • Abnormal chest x-ray with multiple lung nodules 10/10/2016   • Hair loss 09/29/2016   • Neck pain 05/04/2016   • Right elbow tendinitis 02/29/2016   • Nonintractable migraine 08/31/2015   • Peripheral neuropathy 07/09/2015   • BMI 32.0-32.9,adult 01/30/2015   • Visual disturbances 01/30/2015   • Edema " extremities 2014   • Seborrheic keratoses, inflamed 2014   • Right ankle swelling 2014   • Vitamin D deficiency 2010   • Tobacco dependence 2009   • Essential hypertension, benign    • Osteoarthritis of multiple joints    • Contact dermatitis and other eczema, due to unspecified cause    • Esophageal reflux    • Carpal tunnel syndrome    • Prediabetes    • Loss of height    • Mixed hyperlipidemia 2009      Allergies:Erythromycin and Other environmental    Current Outpatient Medications   Medication Sig Dispense Refill   • gabapentin (NEURONTIN) 100 MG Cap TAKE 1 CAP BY MOUTH EVERY BEDTIME. 90 Cap 2   • clobetasol (TEMOVATE) 0.05 % Ointment APPLY TO AFFECTED AREA(S) 2 TIMES A DAY AS NEEDED. APPLY TO KNEES AS NEEDED 60 g 3   • varenicline (CHANTIX CONTINUING MONTH JERI) 1 MG tablet Take 1 Tab by mouth 2 times a day. 60 Tab 3   • ranitidine (ZANTAC) 300 MG tablet TAKE 1 TABLET BY MOUTH 2 TIMES A DAY. 180 Tab 3   • divalproex ER (DEPAKOTE ER) 500 MG TABLET SR 24 HR TAKE 1 TAB BY MOUTH 2 TIMES A DAY. 180 Tab 3   • BACTROBAN 2 % Ointment Apply 1 Application to affected area(s) 2 times a day. 60 g 3   • Cholecalciferol ([employer]S VITAMIN D) 2000 UNIT Cap Take 1 Cap by mouth every bedtime. For vitamin D deficiency 100 Cap 3   • fluticasone (FLONASE) 50 MCG/ACT nasal spray Spray 2 Sprays in nose every day. 1 Bottle 0   • albuterol 108 (90 Base) MCG/ACT Aero Soln inhalation aerosol Inhale 2 Puffs by mouth every 6 hours as needed for Shortness of Breath. 8.5 g 1   • tamsulosin (FLOMAX) 0.4 MG capsule Take 0.4 mg by mouth ONE-HALF HOUR AFTER BREAKFAST.       No current facility-administered medications for this visit.        Social History     Tobacco Use   • Smoking status: Former Smoker     Packs/day: 1.00     Years: 43.00     Pack years: 43.00     Types: Cigarettes     Last attempt to quit: 3/3/2019     Years since quittin.8   • Smokeless tobacco: Never Used   Substance Use Topics   • Alcohol  "use: Yes     Alcohol/week: 0.0 oz     Comment: rare   • Drug use: No     Social History     Patient does not qualify to have social determinant information on file (likely too young).   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        Exam:    /72 (BP Location: Left arm, Patient Position: Sitting, BP Cuff Size: Adult)   Pulse 66   Temp 36.3 °C (97.3 °F) (Temporal)   Resp 12   Ht 1.753 m (5' 9\")   Wt 107.3 kg (236 lb 8.9 oz)   SpO2 96%  Body mass index is 34.93 kg/m².    General:  Well nourished, well developed male in NAD  Head is grossly normal.  Neck: Supple without JVD or bruit. Thyroid is not enlarged.  Pulmonary: Clear to ausculation and percussion.  Normal effort. No rales, ronchi, or wheezing.  Cardiovascular: Regular rate and rhythm without murmur. Carotid and radial pulses are intact and equal bilaterally.  Extremities: no clubbing, cyanosis, or edema.  Skin: Mucinous cyst on the lateral portion dorsum first finger left hand    Patient was seen for 30 minutes face to face of which, 25 minutes was spent counseling regarding discussing situation with the patient and his wife and trying to help them understand next steps..    Please note that this dictation was created using voice recognition software. I have made every reasonable attempt to correct obvious errors, but I expect that there are errors of grammar and possibly content that I did not discover before finalizing the note.    Assessment/Plan:  1. Digital mucinous cyst  Uncontrolled, patient would like this removed today.  We will  cryo cauterized    2. Short-term memory loss  Uncontrolled, will check labs and see the patient back in 3 " weeks and do a Mini-Mental status exam at that time  - FREE THYROXINE; Future  - TSH; Future  - TRIIDOTHYRONINE; Future  - CBC WITH DIFFERENTIAL; Future  - Comp Metabolic Panel; Future       SUBJECTIVE:   Mazin Wing is a 64 y.o. male who presents for lesion removal. We have already discussed this procedure, including option of not performing surgery, technique of surgery and potential for scarring at a recent visit.    OBJECTIVE:   Patient appears well. Vitals are normal.  Skin: mucinous cyst on 1st finger of left hand DIP joint    ASSESSMENT:    mucinous cyst of left findher    PLAN:   After informed consent was obtained cryotherapy with liquid nitrogen was performed. Antibiotic dressing is applied, and wound care instructions provided.  Be alert for any signs of cutaneous infection. The procedure was well tolerated without complications. Follow up: the patient may return prn.

## 2020-01-22 DIAGNOSIS — G43.001 MIGRAINE WITHOUT AURA AND WITH STATUS MIGRAINOSUS, NOT INTRACTABLE: ICD-10-CM

## 2020-01-22 DIAGNOSIS — H53.9 VISUAL DISTURBANCES: ICD-10-CM

## 2020-01-22 RX ORDER — DIVALPROEX SODIUM 500 MG/1
TABLET, EXTENDED RELEASE ORAL
Qty: 180 TAB | Refills: 3 | Status: SHIPPED | OUTPATIENT
Start: 2020-01-22 | End: 2021-06-17

## 2020-01-27 ENCOUNTER — HOSPITAL ENCOUNTER (OUTPATIENT)
Dept: LAB | Facility: MEDICAL CENTER | Age: 65
End: 2020-01-27
Attending: FAMILY MEDICINE
Payer: COMMERCIAL

## 2020-01-27 DIAGNOSIS — R41.3 SHORT-TERM MEMORY LOSS: ICD-10-CM

## 2020-01-27 LAB
BASOPHILS # BLD AUTO: 0.6 % (ref 0–1.8)
BASOPHILS # BLD: 0.04 K/UL (ref 0–0.12)
EOSINOPHIL # BLD AUTO: 0.29 K/UL (ref 0–0.51)
EOSINOPHIL NFR BLD: 4.7 % (ref 0–6.9)
ERYTHROCYTE [DISTWIDTH] IN BLOOD BY AUTOMATED COUNT: 42.5 FL (ref 35.9–50)
HCT VFR BLD AUTO: 46.3 % (ref 42–52)
HGB BLD-MCNC: 15.6 G/DL (ref 14–18)
IMM GRANULOCYTES # BLD AUTO: 0.02 K/UL (ref 0–0.11)
IMM GRANULOCYTES NFR BLD AUTO: 0.3 % (ref 0–0.9)
LYMPHOCYTES # BLD AUTO: 2.24 K/UL (ref 1–4.8)
LYMPHOCYTES NFR BLD: 36.3 % (ref 22–41)
MCH RBC QN AUTO: 30.8 PG (ref 27–33)
MCHC RBC AUTO-ENTMCNC: 33.7 G/DL (ref 33.7–35.3)
MCV RBC AUTO: 91.3 FL (ref 81.4–97.8)
MONOCYTES # BLD AUTO: 0.58 K/UL (ref 0–0.85)
MONOCYTES NFR BLD AUTO: 9.4 % (ref 0–13.4)
NEUTROPHILS # BLD AUTO: 3 K/UL (ref 1.82–7.42)
NEUTROPHILS NFR BLD: 48.7 % (ref 44–72)
NRBC # BLD AUTO: 0 K/UL
NRBC BLD-RTO: 0 /100 WBC
PLATELET # BLD AUTO: 121 K/UL (ref 164–446)
PMV BLD AUTO: 12.3 FL (ref 9–12.9)
RBC # BLD AUTO: 5.07 M/UL (ref 4.7–6.1)
T3 SERPL-MCNC: 120.7 NG/DL (ref 60–181)
T4 FREE SERPL-MCNC: 0.92 NG/DL (ref 0.53–1.43)
TSH SERPL DL<=0.005 MIU/L-ACNC: 1.79 UIU/ML (ref 0.38–5.33)
WBC # BLD AUTO: 6.2 K/UL (ref 4.8–10.8)

## 2020-01-27 PROCEDURE — 84480 ASSAY TRIIODOTHYRONINE (T3): CPT

## 2020-01-27 PROCEDURE — 80053 COMPREHEN METABOLIC PANEL: CPT

## 2020-01-27 PROCEDURE — 84443 ASSAY THYROID STIM HORMONE: CPT

## 2020-01-27 PROCEDURE — 36415 COLL VENOUS BLD VENIPUNCTURE: CPT

## 2020-01-27 PROCEDURE — 85025 COMPLETE CBC W/AUTO DIFF WBC: CPT

## 2020-01-27 PROCEDURE — 84439 ASSAY OF FREE THYROXINE: CPT

## 2020-01-28 LAB
ALBUMIN SERPL BCP-MCNC: 4 G/DL (ref 3.2–4.9)
ALBUMIN/GLOB SERPL: 1.4 G/DL
ALP SERPL-CCNC: 58 U/L (ref 30–99)
ALT SERPL-CCNC: 33 U/L (ref 2–50)
ANION GAP SERPL CALC-SCNC: 6 MMOL/L (ref 0–11.9)
AST SERPL-CCNC: 22 U/L (ref 12–45)
BILIRUB SERPL-MCNC: 0.6 MG/DL (ref 0.1–1.5)
BUN SERPL-MCNC: 14 MG/DL (ref 8–22)
CALCIUM SERPL-MCNC: 8.9 MG/DL (ref 8.5–10.5)
CHLORIDE SERPL-SCNC: 107 MMOL/L (ref 96–112)
CO2 SERPL-SCNC: 25 MMOL/L (ref 20–33)
CREAT SERPL-MCNC: 0.91 MG/DL (ref 0.5–1.4)
GLOBULIN SER CALC-MCNC: 2.8 G/DL (ref 1.9–3.5)
GLUCOSE SERPL-MCNC: 101 MG/DL (ref 65–99)
POTASSIUM SERPL-SCNC: 4.3 MMOL/L (ref 3.6–5.5)
PROT SERPL-MCNC: 6.8 G/DL (ref 6–8.2)
SODIUM SERPL-SCNC: 138 MMOL/L (ref 135–145)

## 2020-02-24 ENCOUNTER — OFFICE VISIT (OUTPATIENT)
Dept: MEDICAL GROUP | Facility: MEDICAL CENTER | Age: 65
End: 2020-02-24
Payer: COMMERCIAL

## 2020-02-24 VITALS
WEIGHT: 242.51 LBS | BODY MASS INDEX: 35.92 KG/M2 | DIASTOLIC BLOOD PRESSURE: 62 MMHG | RESPIRATION RATE: 12 BRPM | OXYGEN SATURATION: 97 % | HEIGHT: 69 IN | TEMPERATURE: 98.6 F | HEART RATE: 72 BPM | SYSTOLIC BLOOD PRESSURE: 134 MMHG

## 2020-02-24 DIAGNOSIS — I10 ESSENTIAL HYPERTENSION, BENIGN: ICD-10-CM

## 2020-02-24 DIAGNOSIS — D69.6 THROMBOCYTOPENIA (HCC): ICD-10-CM

## 2020-02-24 DIAGNOSIS — E78.2 MIXED HYPERLIPIDEMIA: ICD-10-CM

## 2020-02-24 DIAGNOSIS — M67.449 DIGITAL MUCINOUS CYST: ICD-10-CM

## 2020-02-24 DIAGNOSIS — E66.9 OBESITY (BMI 30-39.9): ICD-10-CM

## 2020-02-24 DIAGNOSIS — R73.03 PREDIABETES: ICD-10-CM

## 2020-02-24 PROCEDURE — 99214 OFFICE O/P EST MOD 30 MIN: CPT | Mod: 25 | Performed by: FAMILY MEDICINE

## 2020-02-24 PROCEDURE — 17110 DESTRUCTION B9 LES UP TO 14: CPT | Performed by: FAMILY MEDICINE

## 2020-02-24 NOTE — PROGRESS NOTES
CC:Diagnoses of Essential hypertension, benign, Mixed hyperlipidemia, Obesity (BMI 30-39.9), Prediabetes, Digital mucinous cyst, and Thrombocytopenia (HCC) were pertinent to this visit.    HISTORY OF PRESENT ILLNESS: Patient is a 64 y.o. male established patient who presents today to follow-up on his chronic health problems and the labs that he had done on 1/27/2020.  He would also like to have me refreeze the mucinous cyst that we froze in January because it is not completely gone.      Essential hypertension, benign  This is a chronic health problem that is well controlled with current medications and lifestyle measures. His BP is good at 134/62.  His weight is up at 242.  He is gaining a lb per week currently.    Mixed hyperlipidemia  This is a chronic health problem that is well controlled with current medications and lifestyle measures.  We need to check his lipids again with his weight gain.    Obesity (BMI 30-39.9)  This is a chronic health problem for this patient that he is going the wrong way.  He is gained 6 pounds since we last saw him a month ago.  His blood work shows that his fasting glucose is slightly elevated at 101 everything else looked okay.  He is going to work on weight loss.    Prediabetes  This is a chronic health problem that is well controlled with current medications and lifestyle measures.  His fasting glucose is okay at 101.    Digital mucinous cyst  This continues to be a problem on the left first finger .  We last saw the patient we did freeze this for him.  He did get some decrease in size but it persists.  Patient would like to try freezing it again today.    Thrombocytopenia (HCC)  This is a chronic condition for this patient that continues to persist.  On his most recent CBC done 1/27/2020 his platelet count was 121,000.  Looking back over the last few years on his labs, he has had low platelets since 2011.  Similar to what he has now.  He has never become profoundly  thrombocytopenic with his platelets being less than 100.  For now we will take a wait-and-see attitude.      Patient Active Problem List    Diagnosis Date Noted   • Thrombocytopenia (HCC) 02/24/2020   • Digital mucinous cyst 01/20/2020   • Short-term memory loss 01/20/2020   • Atypical pigmented skin lesion 07/30/2019   • Elevated PSA, less than 10 ng/ml 02/25/2019   • Rib pain on left side 02/16/2018   • Obesity (BMI 30-39.9) 12/11/2017   • Pulmonary nodules 11/01/2016   • Abnormal chest x-ray with multiple lung nodules 10/10/2016   • Hair loss 09/29/2016   • Neck pain 05/04/2016   • Right elbow tendinitis 02/29/2016   • Nonintractable migraine 08/31/2015   • Peripheral neuropathy 07/09/2015   • Visual disturbances 01/30/2015   • Edema extremities 08/22/2014   • Seborrheic keratoses, inflamed 08/22/2014   • Right ankle swelling 04/02/2014   • Vitamin D deficiency 03/08/2010   • Tobacco dependence 11/06/2009   • Essential hypertension, benign    • Osteoarthritis of multiple joints    • Contact dermatitis and other eczema, due to unspecified cause    • Esophageal reflux    • Carpal tunnel syndrome    • Prediabetes    • Loss of height    • Mixed hyperlipidemia 06/05/2009      Allergies:Erythromycin and Other environmental    Current Outpatient Medications   Medication Sig Dispense Refill   • divalproex ER (DEPAKOTE ER) 500 MG TABLET SR 24 HR TAKE 1 TABLET BY MOUTH TWICE A  Tab 3   • gabapentin (NEURONTIN) 100 MG Cap TAKE 1 CAP BY MOUTH EVERY BEDTIME. 90 Cap 2   • clobetasol (TEMOVATE) 0.05 % Ointment APPLY TO AFFECTED AREA(S) 2 TIMES A DAY AS NEEDED. APPLY TO KNEES AS NEEDED 60 g 3   • varenicline (CHANTIX CONTINUING MONTH JERI) 1 MG tablet Take 1 Tab by mouth 2 times a day. 60 Tab 3   • ranitidine (ZANTAC) 300 MG tablet TAKE 1 TABLET BY MOUTH 2 TIMES A DAY. 180 Tab 3   • BACTROBAN 2 % Ointment Apply 1 Application to affected area(s) 2 times a day. 60 g 3   • Cholecalciferol ([employer] VITAMIN D) 2000 UNIT Cap Take 1  Cap by mouth every bedtime. For vitamin D deficiency 100 Cap 3   • fluticasone (FLONASE) 50 MCG/ACT nasal spray Spray 2 Sprays in nose every day. 1 Bottle 0   • albuterol 108 (90 Base) MCG/ACT Aero Soln inhalation aerosol Inhale 2 Puffs by mouth every 6 hours as needed for Shortness of Breath. 8.5 g 1   • tamsulosin (FLOMAX) 0.4 MG capsule Take 0.4 mg by mouth ONE-HALF HOUR AFTER BREAKFAST.       No current facility-administered medications for this visit.        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    - HEENT:  Negative for headaches, vision changes, hearing changes, ear pain, ear discharge, rhinorrhea, sinus congestion, sore throat, and neck pain.      - Respiratory: Negative for cough, sputum production, chest congestion, dyspnea, wheezing, and crackles.      - Cardiovascular: Negative for chest pain, palpitations, orthopnea, and bilateral lower extremity edema.     - Gastrointestinal: Negative for heartburn, nausea, vomiting, abdominal pain, hematochezia, melena, diarrhea, constipation, and greasy/foul-smelling stools.     - Genitourinary: Negative for dysuria, polyuria, hematuria, pyuria, urinary urgency, and urinary incontinence.     - Musculoskeletal: Negative for myalgias, back pain, and joint pain.     - Skin: Problem  "with mucinous cyst on the first digit of left hand.      - Neurological: Negative for dizziness, tingling, tremors, focal sensory deficit, focal weakness and headaches.     - Endo/Heme/Allergies: Does not bruise/bleed easily.     - Psychiatric/Behavioral: Negative for depression, suicidal/homicidal ideation and memory loss.          - NOTE: All other systems reviewed and are negative, except as in HPI.      Exam:    /62 (BP Location: Left arm, Patient Position: Sitting, BP Cuff Size: Adult)   Pulse 72   Temp 37 °C (98.6 °F)   Resp 12   Ht 1.753 m (5' 9\")   Wt 110 kg (242 lb 8.1 oz)   SpO2 97%  Body mass index is 35.81 kg/m².    General:  Well nourished, well developed male in NAD  Head is grossly normal.  Neck: Supple without JVD or bruit. Thyroid is not enlarged.  Pulmonary: Clear to ausculation and percussion.  Normal effort. No rales, ronchi, or wheezing.  Cardiovascular: Regular rate and rhythm without murmur. Carotid and radial pulses are intact and equal bilaterally.  Extremities: no clubbing, cyanosis, or edema.  Mucinous cyst smaller than it was in January but still present.    LABS: 1/27/2020: Results reviewed and discussed with the patient, questions answered.    Please note that this dictation was created using voice recognition software. I have made every reasonable attempt to correct obvious errors, but I expect that there are errors of grammar and possibly content that I did not discover before finalizing the note.    Assessment/Plan:  1. Essential hypertension, benign  Controlled, continue with current meds and lifestyle.      2. Mixed hyperlipidemia  Uncontrolled and patient not wanting to go on medication.  We will have him repeat labs and see me back in 3 months.  In the meantime he is going to work on weight loss and being more physically active.  - Comp Metabolic Panel; Future  - Lipid Profile; Future    3. Obesity (BMI 30-39.9)  Uncontrolled, patient working on weight loss  - Patient " identified as having weight management issue.  Appropriate orders and counseling given.    4. Prediabetes  Controlled, continue with current meds and lifestyle.    - HEMOGLOBIN A1C; Future    5. Digital mucinous cyst  Uncontrolled, we did freeze this again today    6. Thrombocytopenia (HCC)  Uncontrolled but stable.  Patient's platelet counts have not dropped they are staying between 120-130K since 2011  PROCEDURE  SUBJECTIVE:   Mazin Wing is a 64 y.o. male who presents for lesion removal. We have already discussed this procedure, including option of not performing surgery, technique of surgery and potential for scarring at a recent visit.    OBJECTIVE:   Patient appears well. Vitals are normal.  Skin: Mucinous cyst left finger over the DIP joint dorsum of the hand    ASSESSMENT:   normal complete skin exam, with the exception of the mucinous cyst.    PLAN:    cryotherapy with liquid nitrogen was performed. Antibiotic dressing is applied, and wound care instructions provided.  Be alert for any signs of cutaneous infection. The procedure was well tolerated without complications. Follow up: the patient may return prn.

## 2020-02-24 NOTE — ASSESSMENT & PLAN NOTE
This is a chronic health problem for this patient that he is going the wrong way.  He is gained 6 pounds since we last saw him a month ago.  His blood work shows that his fasting glucose is slightly elevated at 101 everything else looked okay.  He is going to work on weight loss.

## 2020-02-24 NOTE — ASSESSMENT & PLAN NOTE
This is a chronic health problem that is well controlled with current medications and lifestyle measures.  We need to check his lipids again with his weight gain.

## 2020-02-24 NOTE — ASSESSMENT & PLAN NOTE
This is a chronic condition for this patient that continues to persist.  On his most recent CBC done 1/27/2020 his platelet count was 121,000.  Looking back over the last few years on his labs, he has had low platelets since 2011.  Similar to what he has now.  He has never become profoundly thrombocytopenic with his platelets being less than 100.  For now we will take a wait-and-see attitude.

## 2020-02-24 NOTE — ASSESSMENT & PLAN NOTE
This continues to be a problem on the left first finger .  We last saw the patient we did freeze this for him.  He did get some decrease in size but it persists.  Patient would like to try freezing it again today.

## 2020-02-24 NOTE — ASSESSMENT & PLAN NOTE
This is a chronic health problem that is well controlled with current medications and lifestyle measures.  His fasting glucose is okay at 101.

## 2020-02-24 NOTE — ASSESSMENT & PLAN NOTE
This is a chronic health problem that is well controlled with current medications and lifestyle measures. His BP is good at 134/62.  His weight is up at 242.  He is gaining a lb per week currently.

## 2020-03-16 ENCOUNTER — TELEPHONE (OUTPATIENT)
Dept: MEDICAL GROUP | Facility: MEDICAL CENTER | Age: 65
End: 2020-03-16

## 2020-03-16 RX ORDER — OMEPRAZOLE 20 MG/1
20 CAPSULE, DELAYED RELEASE ORAL DAILY
Qty: 90 CAP | Refills: 3 | Status: SHIPPED | OUTPATIENT
Start: 2020-03-16 | End: 2022-06-21

## 2020-03-16 NOTE — TELEPHONE ENCOUNTER
VOICEMAIL  1. Caller Name: Mazin Wing                        Call Back Number: [phone] (home)      2. Message: Patient wife called stating that Mazin Wing has stopped taking the ranitidine (ZANTAC) 300 MG tablet because it was recalled and started taking Prilosec. Are we able to write him a script for that?       3. Patient approves office to leave a detailed voicemail/MyChart message: yes

## 2020-03-16 NOTE — TELEPHONE ENCOUNTER
Phone Number Called: [phone] (home)      Call outcome: Spoke to patient regarding message below.    Message: Patient is aware of medication sent over.

## 2020-05-18 ENCOUNTER — APPOINTMENT (OUTPATIENT)
Dept: MEDICAL GROUP | Facility: PHYSICIAN GROUP | Age: 65
End: 2020-05-18
Payer: COMMERCIAL

## 2020-06-05 ENCOUNTER — HOSPITAL ENCOUNTER (OUTPATIENT)
Dept: LAB | Facility: MEDICAL CENTER | Age: 65
End: 2020-06-05
Attending: FAMILY MEDICINE
Payer: COMMERCIAL

## 2020-06-05 DIAGNOSIS — E78.2 MIXED HYPERLIPIDEMIA: ICD-10-CM

## 2020-06-05 DIAGNOSIS — R73.03 PREDIABETES: ICD-10-CM

## 2020-06-05 LAB
ALBUMIN SERPL BCP-MCNC: 4.2 G/DL (ref 3.2–4.9)
ALBUMIN/GLOB SERPL: 1.7 G/DL
ALP SERPL-CCNC: 71 U/L (ref 30–99)
ALT SERPL-CCNC: 45 U/L (ref 2–50)
ANION GAP SERPL CALC-SCNC: 12 MMOL/L (ref 7–16)
AST SERPL-CCNC: 29 U/L (ref 12–45)
BILIRUB SERPL-MCNC: 0.5 MG/DL (ref 0.1–1.5)
BUN SERPL-MCNC: 18 MG/DL (ref 8–22)
CALCIUM SERPL-MCNC: 8.8 MG/DL (ref 8.5–10.5)
CHLORIDE SERPL-SCNC: 105 MMOL/L (ref 96–112)
CHOLEST SERPL-MCNC: 178 MG/DL (ref 100–199)
CO2 SERPL-SCNC: 23 MMOL/L (ref 20–33)
CREAT SERPL-MCNC: 0.78 MG/DL (ref 0.5–1.4)
EST. AVERAGE GLUCOSE BLD GHB EST-MCNC: 120 MG/DL
FASTING STATUS PATIENT QL REPORTED: NORMAL
GLOBULIN SER CALC-MCNC: 2.5 G/DL (ref 1.9–3.5)
GLUCOSE SERPL-MCNC: 113 MG/DL (ref 65–99)
HBA1C MFR BLD: 5.8 % (ref 0–5.6)
HDLC SERPL-MCNC: 35 MG/DL
LDLC SERPL CALC-MCNC: 121 MG/DL
POTASSIUM SERPL-SCNC: 4.3 MMOL/L (ref 3.6–5.5)
PROT SERPL-MCNC: 6.7 G/DL (ref 6–8.2)
SODIUM SERPL-SCNC: 140 MMOL/L (ref 135–145)
TRIGL SERPL-MCNC: 112 MG/DL (ref 0–149)

## 2020-06-05 PROCEDURE — 83036 HEMOGLOBIN GLYCOSYLATED A1C: CPT

## 2020-06-05 PROCEDURE — 80053 COMPREHEN METABOLIC PANEL: CPT

## 2020-06-05 PROCEDURE — 80061 LIPID PANEL: CPT

## 2020-06-05 PROCEDURE — 36415 COLL VENOUS BLD VENIPUNCTURE: CPT

## 2020-06-08 ENCOUNTER — OFFICE VISIT (OUTPATIENT)
Dept: MEDICAL GROUP | Facility: PHYSICIAN GROUP | Age: 65
End: 2020-06-08
Payer: COMMERCIAL

## 2020-06-08 VITALS
RESPIRATION RATE: 16 BRPM | SYSTOLIC BLOOD PRESSURE: 112 MMHG | OXYGEN SATURATION: 95 % | HEART RATE: 86 BPM | TEMPERATURE: 98.2 F | BODY MASS INDEX: 35.25 KG/M2 | DIASTOLIC BLOOD PRESSURE: 62 MMHG | WEIGHT: 238 LBS | HEIGHT: 69 IN

## 2020-06-08 DIAGNOSIS — F17.200 TOBACCO DEPENDENCE: ICD-10-CM

## 2020-06-08 DIAGNOSIS — E78.2 MIXED HYPERLIPIDEMIA: ICD-10-CM

## 2020-06-08 DIAGNOSIS — R73.03 PREDIABETES: ICD-10-CM

## 2020-06-08 DIAGNOSIS — D69.6 THROMBOCYTOPENIA (HCC): ICD-10-CM

## 2020-06-08 DIAGNOSIS — I10 ESSENTIAL HYPERTENSION, BENIGN: ICD-10-CM

## 2020-06-08 PROCEDURE — 99214 OFFICE O/P EST MOD 30 MIN: CPT | Performed by: FAMILY MEDICINE

## 2020-06-08 ASSESSMENT — FIBROSIS 4 INDEX: FIB4 SCORE: 2.29

## 2020-06-08 NOTE — ASSESSMENT & PLAN NOTE
This is not well controlled with his current dietary choices.  His glucose is up to 113.  He is going to keep working on it.

## 2020-06-08 NOTE — ASSESSMENT & PLAN NOTE
This is a chronic health problem uncontrolled with his diet not being as good.  His total cholesterol is still good at 178, triglycerides good at 112, HDL good at 35 and LDL is up at 121.  He continues to refuse a statin and will work on his diet.

## 2020-06-08 NOTE — ASSESSMENT & PLAN NOTE
This is a chronic health problem where the patient continues on Chantix and doesn't have any cravings but still has a psychological dependence on tobacco.  He has broken down and had about 6 packs a cigs since 11/2019.  He is still working on getting back to not smoking.

## 2020-06-08 NOTE — ASSESSMENT & PLAN NOTE
This is a chronic health problem that is well controlled with current medications and lifestyle measures. He will continue with current lifestyle changes.

## 2020-06-09 ENCOUNTER — HOSPITAL ENCOUNTER (OUTPATIENT)
Dept: LAB | Facility: MEDICAL CENTER | Age: 65
End: 2020-06-09
Attending: UROLOGY
Payer: COMMERCIAL

## 2020-06-09 LAB — PSA SERPL-MCNC: 2.34 NG/ML (ref 0–4)

## 2020-06-09 PROCEDURE — 84153 ASSAY OF PSA TOTAL: CPT

## 2020-06-09 PROCEDURE — 36415 COLL VENOUS BLD VENIPUNCTURE: CPT

## 2020-06-09 NOTE — PROGRESS NOTES
CC:Diagnoses of Tobacco dependence, Essential hypertension, benign, Mixed hyperlipidemia, Prediabetes, and Thrombocytopenia (HCC) were pertinent to this visit.    HISTORY OF PRESENT ILLNESS: Patient is a 64 y.o. male established patient who presents today to talk about his chronic health problems and go over his labs which were done on/5/2020.  In talking with the patient he has not been as careful with his dietary program and admits he also has gone back to smoking intermittently.  We spent time talking about changes he needs to make to his diet because his A1c is still good at 5.8 but his fasting glucose is elevated at 113.  If he does not make those changes now we will be dealing with diabetes which runs in his family.      Tobacco dependence  This is a chronic health problem where the patient continues on Chantix and doesn't have any cravings but still has a psychological dependence on tobacco.  He has broken down and had about 6 packs a cigs since 11/2019.  He is still working on getting back to not smoking.    Essential hypertension, benign  This is a chronic health problem that is well controlled with current medications and lifestyle measures. He will continue with current lifestyle changes.    Mixed hyperlipidemia  This is a chronic health problem uncontrolled with his diet not being as good.  His total cholesterol is still good at 178, triglycerides good at 112, HDL good at 35 and LDL is up at 121.  He continues to refuse a statin and will work on his diet.    Prediabetes  This is not well controlled with his current dietary choices.  His glucose is up to 113.  He is going to keep working on it.    Thrombocytopenia (HCC)  This is a chronic health problem that needs to be followed.  Will do CBC with the next labs      Patient Active Problem List    Diagnosis Date Noted   • Thrombocytopenia (HCC) 02/24/2020   • Digital mucinous cyst 01/20/2020   • Short-term memory loss 01/20/2020   • Atypical pigmented skin  lesion 07/30/2019   • Elevated PSA, less than 10 ng/ml 02/25/2019   • Rib pain on left side 02/16/2018   • Obesity (BMI 30-39.9) 12/11/2017   • Pulmonary nodules 11/01/2016   • Abnormal chest x-ray with multiple lung nodules 10/10/2016   • Hair loss 09/29/2016   • Neck pain 05/04/2016   • Right elbow tendinitis 02/29/2016   • Nonintractable migraine 08/31/2015   • Peripheral neuropathy 07/09/2015   • Visual disturbances 01/30/2015   • Edema extremities 08/22/2014   • Seborrheic keratoses, inflamed 08/22/2014   • Right ankle swelling 04/02/2014   • Vitamin D deficiency 03/08/2010   • Tobacco dependence 11/06/2009   • Essential hypertension, benign    • Osteoarthritis of multiple joints    • Contact dermatitis and other eczema, due to unspecified cause    • Esophageal reflux    • Carpal tunnel syndrome    • Prediabetes    • Loss of height    • Mixed hyperlipidemia 06/05/2009      Allergies:Erythromycin and Other environmental    Current Outpatient Medications   Medication Sig Dispense Refill   • diclofenac CR (VOLTAREN-XR) 100 MG TABLET SR 24 HR tablet Take 1 Tab by mouth every day. 30 Tab 3   • omeprazole (PRILOSEC) 20 MG delayed-release capsule Take 1 Cap by mouth every day. 90 Cap 3   • divalproex ER (DEPAKOTE ER) 500 MG TABLET SR 24 HR TAKE 1 TABLET BY MOUTH TWICE A  Tab 3   • gabapentin (NEURONTIN) 100 MG Cap TAKE 1 CAP BY MOUTH EVERY BEDTIME. 90 Cap 2   • clobetasol (TEMOVATE) 0.05 % Ointment APPLY TO AFFECTED AREA(S) 2 TIMES A DAY AS NEEDED. APPLY TO KNEES AS NEEDED 60 g 3   • varenicline (CHANTIX CONTINUING MONTH JERI) 1 MG tablet Take 1 Tab by mouth 2 times a day. 60 Tab 3   • ranitidine (ZANTAC) 300 MG tablet TAKE 1 TABLET BY MOUTH 2 TIMES A DAY. 180 Tab 3   • BACTROBAN 2 % Ointment Apply 1 Application to affected area(s) 2 times a day. 60 g 3   • Cholecalciferol ([employer] VITAMIN D) 2000 UNIT Cap Take 1 Cap by mouth every bedtime. For vitamin D deficiency 100 Cap 3   • fluticasone (FLONASE) 50 MCG/ACT  nasal spray Spray 2 Sprays in nose every day. 1 Bottle 0   • albuterol 108 (90 Base) MCG/ACT Aero Soln inhalation aerosol Inhale 2 Puffs by mouth every 6 hours as needed for Shortness of Breath. 8.5 g 1   • tamsulosin (FLOMAX) 0.4 MG capsule Take 0.4 mg by mouth ONE-HALF HOUR AFTER BREAKFAST.       No current facility-administered medications for this visit.        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    - HEENT:  Negative for headaches, vision changes, hearing changes, ear pain, ear discharge, rhinorrhea, sinus congestion, sore throat, and neck pain.      - Respiratory: Negative for cough, sputum production, chest congestion, dyspnea, wheezing, and crackles.      - Cardiovascular: Negative for chest pain, palpitations, orthopnea, and bilateral lower extremity edema.     - Gastrointestinal: Negative for heartburn, nausea, vomiting, abdominal pain, hematochezia, melena, diarrhea, constipation, and greasy/foul-smelling stools.     - Genitourinary: Negative for dysuria, polyuria, hematuria, pyuria, urinary urgency, and urinary incontinence.     - Musculoskeletal: Positive for bilateral shoulder pain, neck pain and pain that goes down his left arm all the way to the hand.      - Skin: Negative for rash, itching, cyanotic skin color  "change.     - Neurological: Positive for cervical radiculopathy in the left upper extremity.  Otherwise denies dizziness, tingling, tremors, focal sensory deficit, focal weakness and headaches.     - Endo/Heme/Allergies: Does not bruise/bleed easily.     - Psychiatric/Behavioral: Negative for depression, suicidal/homicidal ideation and memory loss.          - NOTE: All other systems reviewed and are negative, except as in HPI.      Exam:    /62 (BP Location: Left arm, Patient Position: Sitting, BP Cuff Size: Large adult)   Pulse 86   Temp 36.8 °C (98.2 °F) (Temporal)   Resp 16   Ht 1.753 m (5' 9\")   Wt 108 kg (238 lb)   SpO2 95%  Body mass index is 35.15 kg/m².    General:  Well nourished, well developed male in NAD  Head is grossly normal.  Neck: Supple without JVD or bruit. Thyroid is not enlarged.  Pulmonary: Clear to ausculation and percussion.  Normal effort. No rales, ronchi, or wheezing.  Cardiovascular: Regular rate and rhythm without murmur. Carotid and radial pulses are intact and equal bilaterally.  Extremities: no clubbing, cyanosis, or edema.  LABS: 6/5/2020: Results reviewed and discussed with the patient, questions answered.    Please note that this dictation was created using voice recognition software. I have made every reasonable attempt to correct obvious errors, but I expect that there are errors of grammar and possibly content that I did not discover before finalizing the note.    Assessment/Plan:  1. Tobacco dependence  Uncontrolled, patient has cut way back on smoking and continues to utilize Chantix but is still smoking on occasion.    2. Essential hypertension, benign  Controlled, continue with current meds and lifestyle.      3. Mixed hyperlipidemia  Uncontrolled secondary to poor dietary choices.  Patient refuses to consider medication.  He will work on lifestyle management and we will recheck in 3 months.  - Comp Metabolic Panel; Future  - Lipid Profile; Future    4. " Prediabetes  Uncontrolled, patient will make lifestyle changes and we will recheck in 3 months  - HEMOGLOBIN A1C; Future    5. Thrombocytopenia (HCC)  Patient needs a repeat CBC when he returns to make sure that his platelet count has not gone any lower.

## 2020-07-16 NOTE — TELEPHONE ENCOUNTER
Encounter Date: 7/16/2020       History     Chief Complaint   Patient presents with    Wound Infection     pt c/o pain to the left foot secondary to a blister that has got infected. Pt personal Physician requested patient go to ED for assessment. patient has a foot boot on.      57yo M with pmh NIDDM, HTN, HLD, presents to ED complaining of wound infection to left foot.  Patient states approximately  2.5 weeks ago he noticed a small blister to his left lateral foot.  Patient states the blister popped, he contacted his podiatrist, was started on ciprofloxacin.  He has been unable to follow-up in clinic until today due to take care of his father while he was hospitalized in [place].  Presented to the podiatrist's office today, had x-ray done, which revealed osteomyelitis of the 5th metatarsal.  Denies fevers, chills, myalgias, unintentional weight loss.  He does admit to foul odor from the wound.  Denies significant pain; mild aching pain.  No other complaints.        Review of patient's allergies indicates:   Allergen Reactions    Morphine Hallucinations     Past Medical History:   Diagnosis Date    Diabetes mellitus, type 2     Hyperlipidemia     Hypertension     Renal disorder     Stage 2 renal disease     Past Surgical History:   Procedure Laterality Date    CERVICAL DISC SURGERY  07/11/2016     History reviewed. No pertinent family history.  Social History     Tobacco Use    Smoking status: Never Smoker    Smokeless tobacco: Never Used   Substance Use Topics    Alcohol use: Not Currently     Alcohol/week: 0.0 standard drinks     Comment: social    Drug use: No     Review of Systems   Constitutional: Negative for activity change, appetite change, chills, fatigue and fever.   Respiratory: Negative for cough and shortness of breath.    Cardiovascular: Negative for chest pain.   Gastrointestinal: Negative for abdominal pain, nausea and vomiting.   Musculoskeletal: Negative for myalgias, neck pain and  Was the patient seen in the last year in this department? Yes     Does patient have an active prescription for medications requested? No     Received Request Via: Pharmacy       neck stiffness.   Skin: Positive for wound.   Neurological: Negative for dizziness, weakness and light-headedness.       Physical Exam     Initial Vitals [07/16/20 1433]   BP Pulse Resp Temp SpO2   139/79 94 18 98.1 °F (36.7 °C) 97 %      MAP       --         Physical Exam    Nursing note and vitals reviewed.  Constitutional: He appears well-developed and well-nourished. He is not diaphoretic. No distress.   Well-appearing nontoxic.  Resting comfortably on exam table.   HENT:   Head: Normocephalic and atraumatic.   Eyes: EOM are normal.   Neck: Neck supple.   Cardiovascular: Intact distal pulses.   1+ DP bilaterally.  1+ pitting pretibial edema bilaterally   Pulmonary/Chest: No respiratory distress.   Musculoskeletal:      Comments: Left lateral foot with foul-smelling deep wound with packing in place, there is some surrounding skin breakdown, entirety of the left foot is warm, there is edema about the entire foot, 2-3 second cap refill all toes.   Neurological: He is alert and oriented to person, place, and time. GCS score is 15. GCS eye subscore is 4. GCS verbal subscore is 5. GCS motor subscore is 6.   Skin: Skin is warm.   Psychiatric: He has a normal mood and affect. Thought content normal.                 ED Course   Procedures  Labs Reviewed   CBC W/ AUTO DIFFERENTIAL - Abnormal; Notable for the following components:       Result Value    RBC 4.43 (*)     Hemoglobin 11.8 (*)     Hematocrit 36.5 (*)     Mean Corpuscular Hemoglobin 26.6 (*)     Lymph # 0.8 (*)     Gran% 80.3 (*)     Lymph% 8.7 (*)     All other components within normal limits   COMPREHENSIVE METABOLIC PANEL - Abnormal; Notable for the following components:    Sodium 128 (*)     CO2 20 (*)     Glucose 117 (*)     BUN, Bld 54 (*)     Creatinine 3.8 (*)     Albumin 2.9 (*)     Alkaline Phosphatase 305 (*)     eGFR if  19 (*)     eGFR if non  17 (*)     All other components within normal limits   SEDIMENTATION RATE -  Abnormal; Notable for the following components:    Sed Rate 103 (*)     All other components within normal limits   C-REACTIVE PROTEIN - Abnormal; Notable for the following components:    .0 (*)     All other components within normal limits   SARS-COV-2 RNA AMPLIFICATION, QUAL - Abnormal; Notable for the following components:    SARS-CoV-2 RNA, Amplification, Qual Positive (*)     All other components within normal limits   D DIMER, QUANTITATIVE - Abnormal; Notable for the following components:    D-Dimer 1.05 (*)     All other components within normal limits   POCT GLUCOSE - Abnormal; Notable for the following components:    POCT Glucose 127 (*)     All other components within normal limits   CULTURE, BLOOD   CULTURE, BLOOD   LACTIC ACID, PLASMA   B-TYPE NATRIURETIC PEPTIDE   PROCALCITONIN   B-TYPE NATRIURETIC PEPTIDE   D DIMER, QUANTITATIVE   D DIMER, QUANTITATIVE   FERRITIN   HEMOGLOBIN A1C   LACTATE DEHYDROGENASE   PROCALCITONIN   LACTATE DEHYDROGENASE   FERRITIN   URINALYSIS   OSMOLALITY, URINE RANDOM   OSMOLALITY, SERUM   SODIUM, URINE, RANDOM   LACTATE DEHYDROGENASE   FERRITIN   HEMOGLOBIN A1C   POCT GLUCOSE MONITORING CONTINUOUS     EKG Readings: (Independently Interpreted)   Normal sinus rhythm, ventricular rate 88 beats per minute.  No evidence of acute ischemia, arrhythmia, or heart block.  No ST elevation.  Normal DE interval.  Normal QT interval.  No previous for comparison.       Imaging Results          X-Ray Chest 1 View (In process)                  Medical Decision Making:   Differential Diagnosis:   Infected wound, abscess, cellulitis, osteomyelitis  Clinical Tests:   Lab Tests: Ordered and Reviewed  Radiological Study: Ordered and Reviewed  Medical Tests: Ordered and Reviewed  ED Management:  56-year-old male diabetic with infected wound to left foot, osteomyelitis confirmed by x-ray.  Patient was sent from podiatrist's office.  He has been on ciprofloxacin for 2 weeks.  Failure of  outpatient therapy, now with osteomyelitis, I will admit with IV antibiotics, consult Podiatry.  COVID swab positive, likely because patient's father was hospitalized with COVID.  Patient denies any symptoms.    Patient be admitted by ; spoke with . Admit order placed.  I spoke with patient at length regarding need for inpatient stay, further care.  He does understand and agree.    Without any signs of systemic illness, vancomycin is stopped.   will bone biopsy tomorrow, cultures will direct our therapy.                                 Clinical Impression:       ICD-10-CM ICD-9-CM   1. Osteomyelitis of left foot, unspecified type  M86.9 730.27   2. Hyponatremia  E87.1 276.1   3. Chronic kidney disease, unspecified CKD stage  N18.9 585.9   4. Failure of outpatient treatment  Z78.9 V49.89   5. COVID-19 virus detected  U07.1          Disposition:   Disposition: Admitted  Condition: Stable     ED Disposition Condition    Admit                                                     Kal Bustamante PA-C  07/16/20 4027

## 2020-08-21 RX ORDER — VARENICLINE TARTRATE 1 MG/1
1 TABLET, FILM COATED ORAL 2 TIMES DAILY
Qty: 60 TAB | Refills: 3 | Status: SHIPPED | OUTPATIENT
Start: 2020-08-21 | End: 2021-01-25

## 2020-09-08 ENCOUNTER — HOSPITAL ENCOUNTER (OUTPATIENT)
Dept: LAB | Facility: MEDICAL CENTER | Age: 65
End: 2020-09-08
Attending: FAMILY MEDICINE
Payer: COMMERCIAL

## 2020-09-08 DIAGNOSIS — E78.2 MIXED HYPERLIPIDEMIA: ICD-10-CM

## 2020-09-08 DIAGNOSIS — R73.03 PREDIABETES: ICD-10-CM

## 2020-09-08 LAB
ALBUMIN SERPL BCP-MCNC: 4.2 G/DL (ref 3.2–4.9)
ALBUMIN/GLOB SERPL: 1.8 G/DL
ALP SERPL-CCNC: 74 U/L (ref 30–99)
ALT SERPL-CCNC: 89 U/L (ref 2–50)
ANION GAP SERPL CALC-SCNC: 12 MMOL/L (ref 7–16)
AST SERPL-CCNC: 46 U/L (ref 12–45)
BILIRUB SERPL-MCNC: 0.4 MG/DL (ref 0.1–1.5)
BUN SERPL-MCNC: 18 MG/DL (ref 8–22)
CALCIUM SERPL-MCNC: 9 MG/DL (ref 8.5–10.5)
CHLORIDE SERPL-SCNC: 104 MMOL/L (ref 96–112)
CHOLEST SERPL-MCNC: 178 MG/DL (ref 100–199)
CO2 SERPL-SCNC: 24 MMOL/L (ref 20–33)
CREAT SERPL-MCNC: 0.84 MG/DL (ref 0.5–1.4)
EST. AVERAGE GLUCOSE BLD GHB EST-MCNC: 123 MG/DL
FASTING STATUS PATIENT QL REPORTED: NORMAL
GLOBULIN SER CALC-MCNC: 2.4 G/DL (ref 1.9–3.5)
GLUCOSE SERPL-MCNC: 110 MG/DL (ref 65–99)
HBA1C MFR BLD: 5.9 % (ref 0–5.6)
HDLC SERPL-MCNC: 34 MG/DL
LDLC SERPL CALC-MCNC: 113 MG/DL
POTASSIUM SERPL-SCNC: 4.1 MMOL/L (ref 3.6–5.5)
PROT SERPL-MCNC: 6.6 G/DL (ref 6–8.2)
SODIUM SERPL-SCNC: 140 MMOL/L (ref 135–145)
TRIGL SERPL-MCNC: 156 MG/DL (ref 0–149)

## 2020-09-08 PROCEDURE — 83036 HEMOGLOBIN GLYCOSYLATED A1C: CPT

## 2020-09-08 PROCEDURE — 80053 COMPREHEN METABOLIC PANEL: CPT

## 2020-09-08 PROCEDURE — 36415 COLL VENOUS BLD VENIPUNCTURE: CPT

## 2020-09-08 PROCEDURE — 80061 LIPID PANEL: CPT

## 2020-09-14 ENCOUNTER — OFFICE VISIT (OUTPATIENT)
Dept: MEDICAL GROUP | Facility: PHYSICIAN GROUP | Age: 65
End: 2020-09-14
Payer: COMMERCIAL

## 2020-09-14 VITALS
WEIGHT: 242 LBS | HEIGHT: 68 IN | TEMPERATURE: 98.4 F | BODY MASS INDEX: 36.68 KG/M2 | RESPIRATION RATE: 14 BRPM | DIASTOLIC BLOOD PRESSURE: 70 MMHG | HEART RATE: 86 BPM | OXYGEN SATURATION: 94 % | SYSTOLIC BLOOD PRESSURE: 120 MMHG

## 2020-09-14 DIAGNOSIS — R79.89 ABNORMAL LFTS (LIVER FUNCTION TESTS): ICD-10-CM

## 2020-09-14 DIAGNOSIS — F17.201 MILD TOBACCO ABUSE IN EARLY REMISSION: ICD-10-CM

## 2020-09-14 DIAGNOSIS — Z23 NEED FOR VACCINATION: ICD-10-CM

## 2020-09-14 DIAGNOSIS — R73.03 PREDIABETES: ICD-10-CM

## 2020-09-14 DIAGNOSIS — E78.2 MIXED HYPERLIPIDEMIA: ICD-10-CM

## 2020-09-14 DIAGNOSIS — I10 ESSENTIAL HYPERTENSION, BENIGN: ICD-10-CM

## 2020-09-14 DIAGNOSIS — M65.331 TRIGGER MIDDLE FINGER OF RIGHT HAND: ICD-10-CM

## 2020-09-14 PROCEDURE — 99214 OFFICE O/P EST MOD 30 MIN: CPT | Mod: 25 | Performed by: FAMILY MEDICINE

## 2020-09-14 PROCEDURE — 90732 PPSV23 VACC 2 YRS+ SUBQ/IM: CPT | Performed by: FAMILY MEDICINE

## 2020-09-14 PROCEDURE — 90471 IMMUNIZATION ADMIN: CPT | Performed by: FAMILY MEDICINE

## 2020-09-14 ASSESSMENT — FIBROSIS 4 INDEX: FIB4 SCORE: 2.62
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
## 2020-09-14 NOTE — ASSESSMENT & PLAN NOTE
This is a chronic health problem that is well controlled with current medications and lifestyle measures. His A1c is good at 5.9 and his glucose is good at 110.

## 2020-09-14 NOTE — ASSESSMENT & PLAN NOTE
This is a chronic health problem that is well controlled with current medications and lifestyle measures.  Patient is now utilizing Chantix and has been able to stay off of cigarettes for at least a month.  He finds with the Chantix it takes away the drive for him to smoke and he feels also mentally better.  He is working through this and will continue to do that.
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
## 2020-09-14 NOTE — ASSESSMENT & PLAN NOTE
This is a new problem for this patient where he notes that his middle finger on the right hand has been triggering recently.  He also had EMGs done that shows that his carpal tunnel has returned probably due to scar tissue that is compressing the nerve once again.  At this point he does not want to do anything with either problem but he will let me know if the trigger finger gets to be severe enough that he needs to have it repaired.

## 2020-09-14 NOTE — ASSESSMENT & PLAN NOTE
This is a chronic health problem for this patient where he is working on lifestyle management.  He is eating more vegetables and more fruit but unfortunately is not able to get in quite as much exercise as he has in the past because of the smoke in the air.  His total cholesterol is now 178, triglycerides slightly elevated at 156, HDL good at 34 LDL is improved down to 113.  He will continue in his efforts and we will recheck in 4 months.

## 2020-09-14 NOTE — ASSESSMENT & PLAN NOTE
This is a chronic health problem that is well controlled with current medications and lifestyle measures. His BP recheck is good at 120/70.

## 2020-09-28 RX ORDER — GABAPENTIN 100 MG/1
100 CAPSULE ORAL
Qty: 90 CAP | Refills: 2 | Status: SHIPPED | OUTPATIENT
Start: 2020-09-28 | End: 2021-08-23

## 2020-12-07 ENCOUNTER — HOSPITAL ENCOUNTER (OUTPATIENT)
Dept: LAB | Facility: MEDICAL CENTER | Age: 65
End: 2020-12-07
Attending: FAMILY MEDICINE
Payer: COMMERCIAL

## 2020-12-07 DIAGNOSIS — E78.2 MIXED HYPERLIPIDEMIA: ICD-10-CM

## 2020-12-07 LAB
ALBUMIN SERPL BCP-MCNC: 4.4 G/DL (ref 3.2–4.9)
ALBUMIN/GLOB SERPL: 1.6 G/DL
ALP SERPL-CCNC: 73 U/L (ref 30–99)
ALT SERPL-CCNC: 77 U/L (ref 2–50)
ANION GAP SERPL CALC-SCNC: 7 MMOL/L (ref 7–16)
AST SERPL-CCNC: 39 U/L (ref 12–45)
BILIRUB SERPL-MCNC: 0.6 MG/DL (ref 0.1–1.5)
BUN SERPL-MCNC: 16 MG/DL (ref 8–22)
CALCIUM SERPL-MCNC: 9.3 MG/DL (ref 8.5–10.5)
CHLORIDE SERPL-SCNC: 104 MMOL/L (ref 96–112)
CHOLEST SERPL-MCNC: 202 MG/DL (ref 100–199)
CO2 SERPL-SCNC: 27 MMOL/L (ref 20–33)
CREAT SERPL-MCNC: 0.91 MG/DL (ref 0.5–1.4)
FASTING STATUS PATIENT QL REPORTED: NORMAL
GLOBULIN SER CALC-MCNC: 2.8 G/DL (ref 1.9–3.5)
GLUCOSE SERPL-MCNC: 107 MG/DL (ref 65–99)
HDLC SERPL-MCNC: 36 MG/DL
LDLC SERPL CALC-MCNC: 144 MG/DL
POTASSIUM SERPL-SCNC: 4.8 MMOL/L (ref 3.6–5.5)
PROT SERPL-MCNC: 7.2 G/DL (ref 6–8.2)
SODIUM SERPL-SCNC: 138 MMOL/L (ref 135–145)
TRIGL SERPL-MCNC: 110 MG/DL (ref 0–149)

## 2020-12-07 PROCEDURE — 36415 COLL VENOUS BLD VENIPUNCTURE: CPT

## 2020-12-07 PROCEDURE — 80053 COMPREHEN METABOLIC PANEL: CPT

## 2020-12-07 PROCEDURE — 80061 LIPID PANEL: CPT

## 2020-12-14 ENCOUNTER — HOSPITAL ENCOUNTER (OUTPATIENT)
Dept: LAB | Facility: MEDICAL CENTER | Age: 65
End: 2020-12-14
Attending: INTERNAL MEDICINE
Payer: COMMERCIAL

## 2020-12-14 ENCOUNTER — OFFICE VISIT (OUTPATIENT)
Dept: MEDICAL GROUP | Facility: PHYSICIAN GROUP | Age: 65
End: 2020-12-14
Payer: COMMERCIAL

## 2020-12-14 VITALS
BODY MASS INDEX: 36.4 KG/M2 | OXYGEN SATURATION: 96 % | WEIGHT: 245.8 LBS | TEMPERATURE: 98 F | SYSTOLIC BLOOD PRESSURE: 116 MMHG | HEART RATE: 59 BPM | RESPIRATION RATE: 16 BRPM | DIASTOLIC BLOOD PRESSURE: 74 MMHG | HEIGHT: 69 IN

## 2020-12-14 DIAGNOSIS — Z01.818 PREOP EXAMINATION: ICD-10-CM

## 2020-12-14 DIAGNOSIS — I45.10 RBBB: ICD-10-CM

## 2020-12-14 LAB
ALBUMIN SERPL BCP-MCNC: 4 G/DL (ref 3.2–4.9)
ALBUMIN/GLOB SERPL: 1.6 G/DL
ALP SERPL-CCNC: 75 U/L (ref 30–99)
ALT SERPL-CCNC: 60 U/L (ref 2–50)
ANION GAP SERPL CALC-SCNC: 7 MMOL/L (ref 7–16)
APPEARANCE UR: CLEAR
AST SERPL-CCNC: 29 U/L (ref 12–45)
BASOPHILS # BLD AUTO: 0.6 % (ref 0–1.8)
BASOPHILS # BLD: 0.04 K/UL (ref 0–0.12)
BILIRUB SERPL-MCNC: 0.4 MG/DL (ref 0.1–1.5)
BILIRUB UR STRIP-MCNC: NORMAL MG/DL
BUN SERPL-MCNC: 16 MG/DL (ref 8–22)
CALCIUM SERPL-MCNC: 9.2 MG/DL (ref 8.5–10.5)
CHLORIDE SERPL-SCNC: 103 MMOL/L (ref 96–112)
CO2 SERPL-SCNC: 25 MMOL/L (ref 20–33)
COLOR UR AUTO: YELLOW
CREAT SERPL-MCNC: 0.77 MG/DL (ref 0.5–1.4)
EOSINOPHIL # BLD AUTO: 0.37 K/UL (ref 0–0.51)
EOSINOPHIL NFR BLD: 5.1 % (ref 0–6.9)
ERYTHROCYTE [DISTWIDTH] IN BLOOD BY AUTOMATED COUNT: 43.4 FL (ref 35.9–50)
GLOBULIN SER CALC-MCNC: 2.5 G/DL (ref 1.9–3.5)
GLUCOSE SERPL-MCNC: 83 MG/DL (ref 65–99)
GLUCOSE UR STRIP.AUTO-MCNC: NORMAL MG/DL
HCT VFR BLD AUTO: 45 % (ref 42–52)
HGB BLD-MCNC: 14.9 G/DL (ref 14–18)
IMM GRANULOCYTES # BLD AUTO: 0.02 K/UL (ref 0–0.11)
IMM GRANULOCYTES NFR BLD AUTO: 0.3 % (ref 0–0.9)
KETONES UR STRIP.AUTO-MCNC: NORMAL MG/DL
LEUKOCYTE ESTERASE UR QL STRIP.AUTO: NORMAL
LYMPHOCYTES # BLD AUTO: 2.72 K/UL (ref 1–4.8)
LYMPHOCYTES NFR BLD: 37.5 % (ref 22–41)
MCH RBC QN AUTO: 30.4 PG (ref 27–33)
MCHC RBC AUTO-ENTMCNC: 33.1 G/DL (ref 33.7–35.3)
MCV RBC AUTO: 91.8 FL (ref 81.4–97.8)
MONOCYTES # BLD AUTO: 0.64 K/UL (ref 0–0.85)
MONOCYTES NFR BLD AUTO: 8.8 % (ref 0–13.4)
NEUTROPHILS # BLD AUTO: 3.46 K/UL (ref 1.82–7.42)
NEUTROPHILS NFR BLD: 47.7 % (ref 44–72)
NITRITE UR QL STRIP.AUTO: NORMAL
NRBC # BLD AUTO: 0 K/UL
NRBC BLD-RTO: 0 /100 WBC
PH UR STRIP.AUTO: 7 [PH] (ref 5–8)
PLATELET # BLD AUTO: 112 K/UL (ref 164–446)
PMV BLD AUTO: 12.3 FL (ref 9–12.9)
POTASSIUM SERPL-SCNC: 4.4 MMOL/L (ref 3.6–5.5)
PROT SERPL-MCNC: 6.5 G/DL (ref 6–8.2)
PROT UR QL STRIP: NORMAL MG/DL
RBC # BLD AUTO: 4.9 M/UL (ref 4.7–6.1)
RBC UR QL AUTO: NORMAL
SODIUM SERPL-SCNC: 135 MMOL/L (ref 135–145)
SP GR UR STRIP.AUTO: 1.02
UROBILINOGEN UR STRIP-MCNC: 1 MG/DL
WBC # BLD AUTO: 7.3 K/UL (ref 4.8–10.8)

## 2020-12-14 PROCEDURE — 36415 COLL VENOUS BLD VENIPUNCTURE: CPT

## 2020-12-14 PROCEDURE — 99214 OFFICE O/P EST MOD 30 MIN: CPT | Performed by: INTERNAL MEDICINE

## 2020-12-14 PROCEDURE — 80053 COMPREHEN METABOLIC PANEL: CPT

## 2020-12-14 PROCEDURE — 81002 URINALYSIS NONAUTO W/O SCOPE: CPT | Performed by: INTERNAL MEDICINE

## 2020-12-14 PROCEDURE — 85025 COMPLETE CBC W/AUTO DIFF WBC: CPT

## 2020-12-14 PROCEDURE — 93000 ELECTROCARDIOGRAM COMPLETE: CPT | Performed by: INTERNAL MEDICINE

## 2020-12-14 RX ORDER — KETOCONAZOLE 20 MG/ML
SHAMPOO TOPICAL
COMMUNITY
Start: 2020-12-12 | End: 2020-12-17

## 2020-12-14 RX ORDER — TRIAMCINOLONE ACETONIDE 1 MG/G
CREAM TOPICAL PRN
COMMUNITY
End: 2021-06-24

## 2020-12-14 RX ORDER — KETOCONAZOLE 20 MG/ML
SHAMPOO TOPICAL
COMMUNITY
End: 2021-05-17

## 2020-12-14 ASSESSMENT — FIBROSIS 4 INDEX: FIB4 SCORE: 2.39

## 2020-12-14 NOTE — PROGRESS NOTES
CC: Preop evaluation      HPI:   Mazin presents today with the following.  Previous patient of Dr. Vaca    1. Preop examination  Patient is here with his wife to request a preoperative evaluation for upcoming left shoulder surgery which will be performed by Dr. Cooper at [facility].  It is required that he complete his preoperative evaluation and release by December 23.  Patient will undergo surgery December 31.  Patient denies any complaints of headaches, dizziness, chest pain, shortness of breath, fatigue, palpitations or lower extremity edema.  Patient is quite physically active.  Patient denies any cardiopulmonary disorders.  Patient's blood pressure has been well controlled.      Patient Active Problem List    Diagnosis Date Noted   • Abnormal LFTs (liver function tests) 09/14/2020   • Trigger middle finger of right hand 09/14/2020   • Thrombocytopenia (HCC) 02/24/2020   • Digital mucinous cyst 01/20/2020   • Short-term memory loss 01/20/2020   • Atypical pigmented skin lesion 07/30/2019   • Elevated PSA, less than 10 ng/ml 02/25/2019   • Rib pain on left side 02/16/2018   • Obesity (BMI 30-39.9) 12/11/2017   • Pulmonary nodules 11/01/2016   • Abnormal chest x-ray with multiple lung nodules 10/10/2016   • Hair loss 09/29/2016   • Neck pain 05/04/2016   • Right elbow tendinitis 02/29/2016   • Nonintractable migraine 08/31/2015   • Peripheral neuropathy 07/09/2015   • Visual disturbances 01/30/2015   • Edema extremities 08/22/2014   • Seborrheic keratoses, inflamed 08/22/2014   • Right ankle swelling 04/02/2014   • Vitamin D deficiency 03/08/2010   • 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 ALLOW TO SIT FOR 5 MINUTES PRIOR RINSING     • triamcinolone acetonide (KENALOG) 0.1 % Cream triamcinolone acetonide 0.1 % topical cream     • diclofenac CR (VOLTAREN-XR) 100 MG TABLET SR 24 HR tablet Take 1 Tab by mouth every day. 90 Tab 3   • gabapentin (NEURONTIN) 100 MG Cap Take 1 Cap by mouth every bedtime. 90 Cap 2   • BACTROBAN 2 % Ointment Apply 1 Application to affected area(s) 2 times a day. 60 g 3   • varenicline (CHANTIX CONTINUING MONTH JERI) 1 MG tablet Take 1 Tab by mouth 2 times a day. 60 Tab 3   • omeprazole (PRILOSEC) 20 MG delayed-release capsule Take 1 Cap by mouth every day. 90 Cap 3   • divalproex ER (DEPAKOTE ER) 500 MG TABLET SR 24 HR TAKE 1 TABLET BY MOUTH TWICE A  Tab 3   • ranitidine (ZANTAC) 300 MG tablet TAKE 1 TABLET BY MOUTH 2 TIMES A DAY. 180 Tab 3   • Cholecalciferol ([employer]S VITAMIN D) 2000 UNIT Cap Take 1 Cap by mouth every bedtime. For vitamin D deficiency 100 Cap 3   • tamsulosin (FLOMAX) 0.4 MG capsule Take 0.4 mg by mouth ONE-HALF HOUR AFTER BREAKFAST.     • influenza Vac High-Dose Quad (FLUZONE) 0.7 ML Suspension Prefilled Syringe injection Fluzone High-Dose Quad 2020-21 (PF) 240 mcg/0.7 mL IM syringe     • influenza vaccine quad 0.5 ML Suspension Prefilled Syringe injection Fluzone Quad 9391-1870 (PF) 60 mcg (15 mcg x 4)/0.5 mL IM syringe     • ketoconazole (NIZORAL) 2 % shampoo      • clobetasol (TEMOVATE) 0.05 % Ointment APPLY TO AFFECTED AREA(S) 2 TIMES A DAY AS NEEDED. APPLY TO KNEES AS NEEDED (Patient not taking: Reported on 12/14/2020) 60 g 3   • fluticasone (FLONASE) 50 MCG/ACT nasal spray Spray 2 Sprays in nose every day. (Patient not taking: Reported on 12/14/2020) 1 Bottle 0   • albuterol 108 (90 Base) MCG/ACT Aero Soln inhalation aerosol Inhale 2 Puffs by mouth every 6 hours as needed for Shortness of  "Breath. (Patient not taking: Reported on 12/14/2020) 8.5 g 1     No current facility-administered medications for this visit.          Allergies as of 12/14/2020 - Reviewed 12/14/2020   Allergen Reaction Noted   • Erythromycin  10/30/2017   • Other environmental  11/25/2009        ROS: As per HPI.  Denies cardiopulmonary, GI, neurologic symptoms.    /74 (BP Location: Right arm, Patient Position: Sitting, BP Cuff Size: Large adult)   Pulse (!) 59   Temp 36.7 °C (98 °F)   Resp 16   Ht 1.753 m (5' 9\")   Wt 111.5 kg (245 lb 12.8 oz)   SpO2 96%   BMI 36.30 kg/m²     Physical Exam:  Gen:         Alert and oriented, No apparent distress.  Neck:        No Lymphadenopathy or Bruits.  No thyromegaly.  Neck is supple.  Lungs:     Clear to auscultation bilaterally, no wheezing rhonchi or crackles.  CV:          Regular rate and rhythm. No murmurs, rubs or gallops.  S1-S2 heard  Abd:         Soft non tender, non distended. Normal active bowel sounds.  No  Hepatosplenomegaly, No pulsatile masses.                   Ext:          No clubbing, cyanosis, edema.  Neuro:     Grossly intact.    Assessment and Plan.   65 y.o. male with the following issues.    1. Preop examination  Labs ordered  EKG shows normal sinus rhythm, right bundle branch block, no ST segment elevations or depressions.  Will compare with prior EKG  Will notify patient with results  Preoperative/medical clearance will be faxed to Dr. Cooper at [phone]    - CBC WITH DIFFERENTIAL; Future  - POCT Urinalysis  - EKG - Clinic Performed  - URINALYSIS,CULTURE IF INDICATED; Future  - Comp Metabolic Panel; Future    Patient will establish with new PCP          Please note that this dictation was created using voice recognition software. I have made every reasonable attempt to correct obvious errors, but expect that there are errors of grammar and possible content that I did not discover before finalizing note.   "

## 2020-12-16 ENCOUNTER — TELEPHONE (OUTPATIENT)
Dept: MEDICAL GROUP | Facility: PHYSICIAN GROUP | Age: 65
End: 2020-12-16

## 2020-12-16 NOTE — TELEPHONE ENCOUNTER
We received fax from Dr Cooper's office. They need surgery clearance for his surgery scheduled 12/31/20. Per Dr Madsen she will not be able to see pt to approve clearance before her last day. I called pt, no answer, left VM with this info. I also left a message with Avis, the  for Dr Cooper with all the info.

## 2020-12-17 ENCOUNTER — PRE-ADMISSION TESTING (OUTPATIENT)
Dept: ADMISSIONS | Facility: MEDICAL CENTER | Age: 65
DRG: 483 | End: 2020-12-17
Attending: ORTHOPAEDIC SURGERY
Payer: COMMERCIAL

## 2020-12-17 DIAGNOSIS — Z01.812 PRE-OPERATIVE LABORATORY EXAMINATION: ICD-10-CM

## 2020-12-17 PROCEDURE — 87640 STAPH A DNA AMP PROBE: CPT

## 2020-12-17 PROCEDURE — 87641 MR-STAPH DNA AMP PROBE: CPT

## 2020-12-17 ASSESSMENT — FIBROSIS 4 INDEX: FIB4 SCORE: 2.17

## 2020-12-17 NOTE — OR NURSING
"Preadmit appointment: \" Preparing for your Procedure information\" sheet given to patient with verbal and written instructions. Patient instructed to continue prescribed medications through the day before surgery, instructed to take the following medications the day of surgery per anesthesia protocol:Divalproex, Omepraxole, Tamsulosin  Verbal and written instructions on self isolation until surgery and covid symptoms to watch for given to patient and patient instructed to call MD if any symptoms develop prior to surgery/procedure. Pt to have covid test 12/28.  Pt saw pcp for clearance and he reports because of his EKG results his pcp wants cardiology visit and pt to see cardiologist on 12/21/20  "

## 2020-12-17 NOTE — DISCHARGE PLANNING
DISCHARGE PLANNING NOTE - TOTAL JOINT    Procedure: Procedure(s):  ARTHROPLASTY, SHOULDER, TOTAL  TENODESIS, BICEPS  Procedure Date: 12/31/2020  Insurance: Payor: [employer] / Plan: SUSA[employer] FEP / Product Type: *No Product type* /    Equipment currently available at home?  ice, pillows, recliner.  Steps into the home? 0  Steps within the home? 0  Toilet height? Standard  Type of shower? walk-in shower  Who will be with you during your recovery? Wife.  Is Outpatient Physical Therapy set up after surgery? No not yet.  Did you take the Total Joint Class and where? Yes [facility] book given to pt.  Planning same day discharge?Yes     This writer met with pt and spouse during his preadmission appointment. Pt states he has all needed equipment. Home safety checklist reviewed and copy given to pt. Pt educated to dc criteria. All questions answered and verbalizes understanding of all instructions. No dc needs identified at this time. Anticipate dc to home without barriers.    Thank you for allowing me the pleasure of participating in this patient's care coordination and discharge planning.

## 2020-12-18 LAB
SCCMEC + MECA PNL NOSE NAA+PROBE: NEGATIVE
SCCMEC + MECA PNL NOSE NAA+PROBE: NEGATIVE

## 2020-12-21 ENCOUNTER — OFFICE VISIT (OUTPATIENT)
Dept: CARDIOLOGY | Facility: MEDICAL CENTER | Age: 65
End: 2020-12-21
Payer: COMMERCIAL

## 2020-12-21 ENCOUNTER — OFFICE VISIT (OUTPATIENT)
Dept: MEDICAL GROUP | Facility: PHYSICIAN GROUP | Age: 65
End: 2020-12-21
Payer: COMMERCIAL

## 2020-12-21 VITALS
OXYGEN SATURATION: 96 % | WEIGHT: 244.6 LBS | DIASTOLIC BLOOD PRESSURE: 80 MMHG | BODY MASS INDEX: 36.23 KG/M2 | HEIGHT: 69 IN | SYSTOLIC BLOOD PRESSURE: 142 MMHG | HEART RATE: 68 BPM

## 2020-12-21 VITALS
TEMPERATURE: 97.9 F | WEIGHT: 244 LBS | HEART RATE: 71 BPM | SYSTOLIC BLOOD PRESSURE: 118 MMHG | RESPIRATION RATE: 18 BRPM | BODY MASS INDEX: 36.14 KG/M2 | OXYGEN SATURATION: 96 % | HEIGHT: 69 IN | DIASTOLIC BLOOD PRESSURE: 80 MMHG

## 2020-12-21 DIAGNOSIS — R73.03 PREDIABETES: ICD-10-CM

## 2020-12-21 DIAGNOSIS — E78.2 MIXED HYPERLIPIDEMIA: ICD-10-CM

## 2020-12-21 DIAGNOSIS — R03.0 PREHYPERTENSION: ICD-10-CM

## 2020-12-21 DIAGNOSIS — R06.09 DOE (DYSPNEA ON EXERTION): ICD-10-CM

## 2020-12-21 DIAGNOSIS — Z01.810 PREOP CARDIOVASCULAR EXAM: ICD-10-CM

## 2020-12-21 DIAGNOSIS — I45.10 RBBB: ICD-10-CM

## 2020-12-21 DIAGNOSIS — D69.6 THROMBOCYTOPENIA (HCC): ICD-10-CM

## 2020-12-21 DIAGNOSIS — I10 ESSENTIAL HYPERTENSION, BENIGN: ICD-10-CM

## 2020-12-21 DIAGNOSIS — F17.201 MILD TOBACCO ABUSE IN EARLY REMISSION: ICD-10-CM

## 2020-12-21 DIAGNOSIS — R79.89 ABNORMAL LFTS (LIVER FUNCTION TESTS): ICD-10-CM

## 2020-12-21 PROCEDURE — 99214 OFFICE O/P EST MOD 30 MIN: CPT | Performed by: FAMILY MEDICINE

## 2020-12-21 PROCEDURE — 99204 OFFICE O/P NEW MOD 45 MIN: CPT | Performed by: INTERNAL MEDICINE

## 2020-12-21 RX ORDER — ATORVASTATIN CALCIUM 10 MG/1
10 TABLET, FILM COATED ORAL DAILY
Qty: 100 TAB | Refills: 3 | Status: SHIPPED | OUTPATIENT
Start: 2020-12-21 | End: 2021-12-17

## 2020-12-21 ASSESSMENT — FIBROSIS 4 INDEX
FIB4 SCORE: 2.17
FIB4 SCORE: 2.17

## 2020-12-21 NOTE — ASSESSMENT & PLAN NOTE
This is a chronic health problem that is well controlled with current medications and lifestyle measures. He continues to work on lifestyle.  His glucose is okay at 107.

## 2020-12-21 NOTE — ASSESSMENT & PLAN NOTE
This is a chronic health problem for this patient who saw Dr. Colbert from cardiology earlier today.  His total cholesterol is 202, triglycerides are great at 110, HDL slightly low at 36 and his LDL is elevated at 144.  She started him on atorvastatin 10 mg daily.  She is going to see him in 6 months I had like to see him back in April with an LDL and lipid panel prior to that to make certain that we have his LDL less than 100.

## 2020-12-21 NOTE — LETTER
[facility]   [address], [address]  [place] [address]  Phone: [phone]  Fax: [phone]              Mazin Wing  1955    Encounter Date: 12/21/2020    Riana Colbert M.D.          PROGRESS NOTE:  Subjective:   Chief Complaint:   Chief Complaint   Patient presents with   • New Patient     Surgical Clearance for Shoulder       Mazin Wing is a 65 y.o. male who is referred by Dr. Cooper for preoperative risk assessment prior to shoulder surgery in the setting of an abnormal ecg.    ECG with RBBB    Has borderline hypertension, no meds.    Has mixed hyperlipidemia,, , HDL low. Used to be on meds.  Has prediabetes, IFG in the past, no meds.    Former smoker, quit 2019 with some cheats but not for 3 months.    Has thrombocytopenia, only very mild, 112K.    Prior abnormal LFTs, ALT up a bit, no sign etoh really to explain.    He is not limited by chest pain, pressure or tightness with activity.   Does have some mild DORADO, he thinks related to prior smoking, and weight gain.  No significant dyspnea on exertion, orthopnea or lower extremity swelling.   No significant palpitations, lightheadedness, or presyncope/syncope.   No symptoms of leg claudication.   No stroke/TIA like symptoms.    No family history of premature coronary artery disease.  No history of autoimmune disease such as lupus or rheumatoid arthritis.  No chronic kidney disease.  No ETOH overuse.  No caffeine overuse.  No recreation substance use.    Here with his wife, she sees Dr. Devin Evans, Jay Contreras, prior ablation  Works in [employer] service.    DATA REVIEWED by me:  ECG (my personal interpretation) 12/14/2020  Sinus, 58, RBBB    Echo none    Most recent labs:       Lab Results   Component Value Date/Time    WBC 7.3 12/14/2020 12:29 PM    HEMOGLOBIN 14.9 12/14/2020 12:29 PM    HEMATOCRIT 45.0 12/14/2020 12:29 PM    MCV 91.8 12/14/2020 12:29 PM      Lab Results   Component  Value Date/Time    SODIUM 135 12/14/2020 12:29 PM    POTASSIUM 4.4 12/14/2020 12:29 PM    CHLORIDE 103 12/14/2020 12:29 PM    CO2 25 12/14/2020 12:29 PM    GLUCOSE 83 12/14/2020 12:29 PM    BUN 16 12/14/2020 12:29 PM    CREATININE 0.77 12/14/2020 12:29 PM      Lab Results   Component Value Date/Time    ASTSGOT 29 12/14/2020 12:29 PM    ALTSGPT 60 (H) 12/14/2020 12:29 PM    ALBUMIN 4.0 12/14/2020 12:29 PM      Lab Results   Component Value Date/Time    CHOLSTRLTOT 202 (H) 12/07/2020 10:03 AM     (H) 12/07/2020 10:03 AM    HDL 36 (A) 12/07/2020 10:03 AM    TRIGLYCERIDE 110 12/07/2020 10:03 AM     No results for input(s): NTPROBNP, TROPONINT in the last 72 hours.      Past Medical History:   Diagnosis Date   • Abnormal LFTs (liver function tests) 9/14/2020   • Anesthesia     occasionally wakes up agitated/ anxious   • Arthritis    • Atypical pigmented skin lesion 7/30/2019   • Carpal tunnel syndrome     bilateral, uncontrolled   • Cholesterol blood decreased    • Contact dermatitis and other eczema, due to unspecified cause     Chronic, controlled with meds   • Edema extremities 8/22/2014   • Elevated PSA, less than 10 ng/ml 2/25/2019   • Epididymitis     chronic on left   • Esophageal reflux    • Heart burn    • Loss of height     Loss of 1.5 inches 2009,   • 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 11/6/2009    Quit 12/09, then restarted    • Mixed hyperlipidemia    • Osteoarthrosis involving, or with mention of more than one site, but not specified as generalized, multiple sites    • Pain 9/3/14    left knee   • Prediabetes     uncontrolled   • Seborrheic keratoses, inflamed 8/22/2014   • Seizure ([facility]) 2013    seizure is visual changes only, none since medication started in 2013   • Short-term memory loss 1/20/2020   • Snoring    • Thrombocytopenia ([facility]) 2/24/2020   • Tinnitus     chronic with hearing loss   • Unspecified vitamin D deficiency 3/8/2010   • Visual disturbances 1/30/2015     Past Surgical History:     Procedure Laterality Date   • KNEE ARTHROPLASTY TOTAL  9/15/2014    Performed by Cesar Abreu M.D. at SURGERY [facility] ORS   • KNEE ARTHROPLASTY TOTAL  3/7/2011    right   • CARPAL TUNNEL ENDOSCOPIC  2009    Performed by RONNIE MAURICIO at SURGERY [facility] ORS   • CERVICAL DISK AND FUSION ANTERIOR  2009    Performed by AMINA NERI at SUR[facility]ERY [facility]   • TOENAIL REMOVAL      bilateral great toes   • ARTHROSCOPY, KNEE      right   • HEMORRHOIDECTOMY     • VASECTOMY       Family History   Problem Relation Age of Onset   • Cancer Mother    • Lung Cancer Mother    • Breast Cancer Daughter    • Lung Disease Other      Social History     Socioeconomic History   • Marital status:      Spouse name: Not on file   • Number of children: Not on file   • Years of education: Not on file   • Highest education level: Not on file   Occupational History   • Not on file   Social Needs   • Financial resource strain: Not on file   • Food insecurity     Worry: Not on file     Inability: Not on file   • Transportation needs     Medical: Not on file     Non-medical: Not on file   Tobacco Use   • Smoking status: Former Smoker     Packs/day: 1.00     Years: 43.00     Pack years: 43.00     Types: Cigarettes     Quit date: 2020     Years since quittin.3   • Smokeless tobacco: Never Used   Substance and Sexual Activity   • Alcohol use: Not Currently     Alcohol/week: 0.0 oz     Comment: rare   • Drug use: No   • Sexual activity: Yes     Partners: Female     Birth control/protection: Post-Menopausal     Comment: , 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[employer]   • Physical activity     Days per week: Not on file     Minutes per session: Not on file   • Stress: Not on file   Relationships   • Social connections     Talks on phone: Not on file     Gets together: Not on file     Attends Quaker service: Not on file     Active member of club or organization: Not on file     Attends  meetings of clubs or organizations: Not on file     Relationship status: Not on file   • Intimate partner violence     Fear of current or ex partner: Not on file     Emotionally abused: Not on file     Physically abused: Not on file     Forced sexual activity: Not on file   Other Topics Concern   •  Service Not Asked   • Blood Transfusions Not Asked   • Caffeine Concern Not Asked   • Occupational Exposure Not Asked   • Hobby Hazards Not Asked   • Sleep Concern Not Asked   • Stress Concern Not Asked   • Weight Concern Not Asked   • Special Diet Not Asked   • Back Care Not Asked   • Exercise No   • Bike Helmet Not Asked   • Seat Belt Not Asked   • Self-Exams Not Asked   Social History Narrative    , 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   • Other Environmental      pollen       Current Outpatient Medications   Medication Sig Dispense Refill   • atorvastatin (LIPITOR) 10 MG Tab Take 1 Tab by mouth every day. 100 Tab 3   • ketoconazole (NIZORAL) 2 % shampoo ketoconazole 2 % shampoo   WASH SCALP EEVERY DAY UP TO 5 DAYS A WEEK AS NEEDED. ALLOW TO SIT FOR 5 MINUTES PRIOR RINSING     • triamcinolone acetonide (KENALOG) 0.1 % Cream as needed.     • diclofenac CR (VOLTAREN-XR) 100 MG TABLET SR 24 HR tablet Take 1 Tab by mouth every day. 90 Tab 3   • gabapentin (NEURONTIN) 100 MG Cap Take 1 Cap by mouth every bedtime. 90 Cap 2   • BACTROBAN 2 % Ointment Apply 1 Application to affected area(s) 2 times a day. (Patient taking differently: Apply 1 Application topically as needed.) 60 g 3   • varenicline (CHANTIX CONTINUING MONTH JERI) 1 MG tablet Take 1 Tab by mouth 2 times a day. 60 Tab 3   • omeprazole (PRILOSEC) 20 MG delayed-release capsule Take 1 Cap by mouth every day. 90 Cap 3   • divalproex ER (DEPAKOTE ER) 500 MG TABLET SR 24 HR TAKE 1 TABLET BY MOUTH TWICE A  Tab 3   • Cholecalciferol  "([employer] VITAMIN D) 2000 UNIT Cap Take 1 Cap by mouth every bedtime. For vitamin D deficiency 100 Cap 3   • tamsulosin (FLOMAX) 0.4 MG capsule Take 0.4 mg by mouth ONE-HALF HOUR AFTER BREAKFAST.       No current facility-administered medications for this visit.        ROS  All others systems reviewed and negative.     Objective:     /80 (BP Location: Left arm, Patient Position: Sitting, BP Cuff Size: Adult)   Pulse 68   Ht 1.753 m (5' 9\")   Wt 110.9 kg (244 lb 9.6 oz)   SpO2 96%  Body mass index is 36.12 kg/m².    General: No acute distress. Well nourished.  HEENT: EOM grossly intact, no scleral icterus, no pharyngeal erythema.   Neck:  No JVD, no bruits, trachea midline  CVS: RRR. Normal S1, S2. No M/R/G. No LE edema.  2+ radial pulses, 2+ PT pulses  Resp: CTAB. No wheezing or crackles/rhonchi. Normal respiratory effort.  Abdomen: Soft, NT, no rosalia hepatomegaly.  MSK/Ext: No clubbing or cyanosis.  Skin: Warm and dry, no rashes.  Neurological: CN III-XII grossly intact. No focal deficits.   Psych: A&O x 3, appropriate affect, good judgement        Assessment:     1. Preop cardiovascular exam     2. Mixed hyperlipidemia     3. Essential hypertension, benign     4. Prediabetes     5. 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 Thrombocytopenia (HCC)     7. Abnormal LFTs (liver function tests)     8. RBBB  EC-ECHOCARDIOGRAM COMPLETE W/O CONT   9. Prehypertension     10. DORADO (dyspnea on exertion)  EC-ECHOCARDIOGRAM COMPLETE W/O CONT       Medical Decision Making:  Today's Assessment / Status / Plan:     -He has mixed HLP, not a good combination, we discussed today, I recommend statin for primary prevention, he is agreable., start lipitor 10 mg  -BP elevated but he tells   -RBBB is not a concern but echo is reasonable to help anesthesia  -Echo for DORADO prior to surgery  -I don't think he needs a stress test prior, would delay surgery, able to walk without angina  -Not high risk, letter today  -He will return in 6 " months to cont encourage lifestyle changes. Will order lfts and lipids next visit.      Written instructions given today:    -Atorvastatin 10 mg once daily, can be take any time day. We will need to further watch your liver function as it can affect liver testing but no long term liver damage.  -We will check you liver in 6 months  -Echocardiogram- heart pictures to look at the heart structure and pump function.    -You should always hear results of testing within 5 days with my interpretation, if you do not, send a Sob[facility] message or call the office: [phone].    -We now know that lack of exercise is as risky as smoking or having diabetes in terms of your heart health.  Try to perform a moderate intensity exercise which includes brisk walking (swimming, cycling) at least 150 minutes a week or 30 minutes for 5 days in the week.        Return in about 6 months (around 6/21/2021).    It is my pleasure to participate in the care of Mr. Wing.  Please do not hesitate to contact me with questions or concerns.    Riana Colbert MD, [facility]  Cardiologist [facility]    Please note that this dictation was created using voice recognition software. I have made every reasonable attempt to correct obvious errors, but it is possible there are errors of grammar and possibly content that I did not discover before finalizing the note.        Rashida Madsen M.D.  [address] [address]  Via In Basket

## 2020-12-21 NOTE — PROGRESS NOTES
Subjective:   Chief Complaint:   Chief Complaint   Patient presents with   • New Patient     Surgical Clearance for Shoulder       Mazin Wing is a 65 y.o. male who is referred by Dr. Cooper for preoperative risk assessment prior to shoulder surgery in the setting of an abnormal ecg.    ECG with RBBB    Has borderline hypertension, no meds.    Has mixed hyperlipidemia,, , HDL low. Used to be on meds.  Has prediabetes, IFG in the past, no meds.    Former smoker, quit 2019 with some cheats but not for 3 months.    Has thrombocytopenia, only very mild, 112K.    Prior abnormal LFTs, ALT up a bit, no sign etoh really to explain.    He is not limited by chest pain, pressure or tightness with activity.   Does have some mild DORADO, he thinks related to prior smoking, and weight gain.  No significant dyspnea on exertion, orthopnea or lower extremity swelling.   No significant palpitations, lightheadedness, or presyncope/syncope.   No symptoms of leg claudication.   No stroke/TIA like symptoms.    No family history of premature coronary artery disease.  No history of autoimmune disease such as lupus or rheumatoid arthritis.  No chronic kidney disease.  No ETOH overuse.  No caffeine overuse.  No recreation substance use.    Here with his wife, she sees Dr. Devin Evans, Jay Contreras, prior ablation  Works in [employer] service.    DATA REVIEWED by me:  ECG (my personal interpretation) 12/14/2020  Sinus, 58, RBBB    Echo none    Most recent labs:       Lab Results   Component Value Date/Time    WBC 7.3 12/14/2020 12:29 PM    HEMOGLOBIN 14.9 12/14/2020 12:29 PM    HEMATOCRIT 45.0 12/14/2020 12:29 PM    MCV 91.8 12/14/2020 12:29 PM      Lab Results   Component Value Date/Time    SODIUM 135 12/14/2020 12:29 PM    POTASSIUM 4.4 12/14/2020 12:29 PM    CHLORIDE 103 12/14/2020 12:29 PM    CO2 25 12/14/2020 12:29 PM    GLUCOSE 83 12/14/2020 12:29 PM    BUN 16 12/14/2020 12:29 PM    CREATININE 0.77 12/14/2020 12:29 PM      Lab  Results   Component Value Date/Time    ASTSGOT 29 12/14/2020 12:29 PM    ALTSGPT 60 (H) 12/14/2020 12:29 PM    ALBUMIN 4.0 12/14/2020 12:29 PM      Lab Results   Component Value Date/Time    CHOLSTRLTOT 202 (H) 12/07/2020 10:03 AM     (H) 12/07/2020 10:03 AM    HDL 36 (A) 12/07/2020 10:03 AM    TRIGLYCERIDE 110 12/07/2020 10:03 AM     No results for input(s): NTPROBNP, TROPONINT in the last 72 hours.      Past Medical History:   Diagnosis Date   • Abnormal LFTs (liver function tests) 9/14/2020   • Anesthesia     occasionally wakes up agitated/ anxious   • Arthritis    • Atypical pigmented skin lesion 7/30/2019   • Carpal tunnel syndrome     bilateral, uncontrolled   • Cholesterol blood decreased    • Contact dermatitis and other eczema, due to unspecified cause     Chronic, controlled with meds   • Edema extremities 8/22/2014   • Elevated PSA, less than 10 ng/ml 2/25/2019   • Epididymitis     chronic on left   • Esophageal reflux    • Heart burn    • Loss of height     Loss of 1.5 inches 2009,   • 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 11/6/2009    Quit 12/09, then restarted    • Mixed hyperlipidemia    • Osteoarthrosis involving, or with mention of more than one site, but not specified as generalized, multiple sites    • Pain 9/3/14    left knee   • Prediabetes     uncontrolled   • Seborrheic keratoses, inflamed 8/22/2014   • Seizure ([facility]) 2013    seizure is visual changes only, none since medication started in 2013   • Short-term memory loss 1/20/2020   • Snoring    • Thrombocytopenia ([facility]) 2/24/2020   • Tinnitus     chronic with hearing loss   • Unspecified vitamin D deficiency 3/8/2010   • Visual disturbances 1/30/2015     Past Surgical History:   Procedure Laterality Date   • KNEE ARTHROPLASTY TOTAL  9/15/2014    Performed by Cesar Abreu M.D. at SURGERY [facility]   • KNEE ARTHROPLASTY TOTAL  3/7/2011    right   • CARPAL TUNNEL ENDOSCOPIC  12/1/2009    Performed by RONNIE MAURICIO at SURGERY  [facility]   • CERVICAL DISK AND FUSION ANTERIOR  2009    Performed by AMINA NERI at SURGERY [facility]   • TOENAIL REMOVAL      bilateral great toes   • ARTHROSCOPY, KNEE      right   • HEMORRHOIDECTOMY     • VASECTOMY       Family History   Problem Relation Age of Onset   • Cancer Mother    • Lung Cancer Mother    • Breast Cancer Daughter    • Lung Disease Other      Social History     Socioeconomic History   • Marital status:      Spouse name: Not on file   • Number of children: Not on file   • Years of education: Not on file   • Highest education level: Not on file   Occupational History   • Not on file   Social Needs   • Financial resource strain: Not on file   • Food insecurity     Worry: Not on file     Inability: Not on file   • Transportation needs     Medical: Not on file     Non-medical: Not on file   Tobacco Use   • Smoking status: Former Smoker     Packs/day: 1.00     Years: 43.00     Pack years: 43.00     Types: Cigarettes     Quit date: 2020     Years since quittin.3   • Smokeless tobacco: Never Used   Substance and Sexual Activity   • Alcohol use: Not Currently     Alcohol/week: 0.0 oz     Comment: rare   • Drug use: No   • Sexual activity: Yes     Partners: Female     Birth control/protection: Post-Menopausal     Comment: , work at [employer] at [employer]   Lifestyle   • Physical activity     Days per week: Not on file     Minutes per session: Not on file   • Stress: Not on file   Relationships   • Social connections     Talks on phone: Not on file     Gets together: Not on file     Attends [employer] service: Not on file     Active member of club or organization: Not on file     Attends meetings of clubs or organizations: Not on file     Relationship status: Not on file   • Intimate partner violence     Fear of current or ex partner: Not on file     Emotionally abused: Not on file     Physically abused: Not on file     Forced sexual activity: Not  on file   Other Topics Concern   •  Service Not Asked   • Blood Transfusions Not Asked   • Caffeine Concern Not Asked   • Occupational Exposure Not Asked   • Hobby Hazards Not Asked   • Sleep Concern Not Asked   • Stress Concern Not Asked   • Weight Concern Not Asked   • Special Diet Not Asked   • Back Care Not Asked   • Exercise No   • Bike Helmet Not Asked   • Seat Belt Not Asked   • Self-Exams Not Asked   Social History Narrative    , 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   • Other Environmental      pollen       Current Outpatient Medications   Medication Sig Dispense Refill   • atorvastatin (LIPITOR) 10 MG Tab Take 1 Tab by mouth every day. 100 Tab 3   • ketoconazole (NIZORAL) 2 % shampoo ketoconazole 2 % shampoo   WASH SCALP EEVERY DAY UP TO 5 DAYS A WEEK AS NEEDED. ALLOW TO SIT FOR 5 MINUTES PRIOR RINSING     • triamcinolone acetonide (KENALOG) 0.1 % Cream as needed.     • diclofenac CR (VOLTAREN-XR) 100 MG TABLET SR 24 HR tablet Take 1 Tab by mouth every day. 90 Tab 3   • gabapentin (NEURONTIN) 100 MG Cap Take 1 Cap by mouth every bedtime. 90 Cap 2   • BACTROBAN 2 % Ointment Apply 1 Application to affected area(s) 2 times a day. (Patient taking differently: Apply 1 Application topically as needed.) 60 g 3   • varenicline (CHANTIX CONTINUING MONTH JERI) 1 MG tablet Take 1 Tab by mouth 2 times a day. 60 Tab 3   • omeprazole (PRILOSEC) 20 MG delayed-release capsule Take 1 Cap by mouth every day. 90 Cap 3   • divalproex ER (DEPAKOTE ER) 500 MG TABLET SR 24 HR TAKE 1 TABLET BY MOUTH TWICE A  Tab 3   • Cholecalciferol ([employer]S VITAMIN D) 2000 UNIT Cap Take 1 Cap by mouth every bedtime. For vitamin D deficiency 100 Cap 3   • tamsulosin (FLOMAX) 0.4 MG capsule Take 0.4 mg by mouth ONE-HALF HOUR AFTER BREAKFAST.       No current facility-administered medications for this visit.   "      ROS  All others systems reviewed and negative.     Objective:     /80 (BP Location: Left arm, Patient Position: Sitting, BP Cuff Size: Adult)   Pulse 68   Ht 1.753 m (5' 9\")   Wt 110.9 kg (244 lb 9.6 oz)   SpO2 96%  Body mass index is 36.12 kg/m².    General: No acute distress. Well nourished.  HEENT: EOM grossly intact, no scleral icterus, no pharyngeal erythema.   Neck:  No JVD, no bruits, trachea midline  CVS: RRR. Normal S1, S2. No M/R/G. No LE edema.  2+ radial pulses, 2+ PT pulses  Resp: CTAB. No wheezing or crackles/rhonchi. Normal respiratory effort.  Abdomen: Soft, NT, no rosalia hepatomegaly.  MSK/Ext: No clubbing or cyanosis.  Skin: Warm and dry, no rashes.  Neurological: CN III-XII grossly intact. No focal deficits.   Psych: A&O x 3, appropriate affect, good judgement        Assessment:     1. Preop cardiovascular exam     2. Mixed hyperlipidemia     3. Essential hypertension, benign     4. Prediabetes     5. 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 Thrombocytopenia (HCC)     7. Abnormal LFTs (liver function tests)     8. RBBB  EC-ECHOCARDIOGRAM COMPLETE W/O CONT   9. Prehypertension     10. DORADO (dyspnea on exertion)  EC-ECHOCARDIOGRAM COMPLETE W/O CONT       Medical Decision Making:  Today's Assessment / Status / Plan:     -He has mixed HLP, not a good combination, we discussed today, I recommend statin for primary prevention, he is agreable., start lipitor 10 mg  -BP elevated but he tells   -RBBB is not a concern but echo is reasonable to help anesthesia  -Echo for DORADO prior to surgery  -I don't think he needs a stress test prior, would delay surgery, able to walk without angina  -Not high risk, letter today  -He will return in 6 months to cont encourage lifestyle changes. Will order lfts and lipids next visit.      Written instructions given today:    -Atorvastatin 10 mg once daily, can be take any time day. We will need to further watch your liver function as it can affect liver " testing but no long term liver damage.  -We will check you liver in 6 months  -Echocardiogram- heart pictures to look at the heart structure and pump function.    -You should always hear results of testing within 5 days with my interpretation, if you do not, send a [employer] message or call the office: [phone].    -We now know that lack of exercise is as risky as smoking or having diabetes in terms of your heart health.  Try to perform a moderate intensity exercise which includes brisk walking (swimming, cycling) at least 150 minutes a week or 30 minutes for 5 days in the week.        Return in about 6 months (around 6/21/2021).    It is my pleasure to participate in the care of Mr. Wing.  Please do not hesitate to contact me with questions or concerns.    Riana Colbert MD, [facility]  Cardiologist [facility]    Please note that this dictation was created using voice recognition software. I have made every reasonable attempt to correct obvious errors, but it is possible there are errors of grammar and possibly content that I did not discover before finalizing the note.

## 2020-12-21 NOTE — PATIENT INSTRUCTIONS
-Atorvastatin 10 mg once daily, can be take any time day. We will need to further watch your liver function as it can affect liver testing but no long term liver damage.  -We will check you liver in 6 months  -Echocardiogram- heart pictures to look at the heart structure and pump function.    -You should always hear results of testing within 5 days with my interpretation, if you do not, send a [employer] message or call the office: [phone].    -We now know that lack of exercise is as risky as smoking or having diabetes in terms of your heart health.  Try to perform a moderate intensity exercise which includes brisk walking (swimming, cycling) at least 150 minutes a week or 30 minutes for 5 days in the week.

## 2020-12-21 NOTE — PROGRESS NOTES
CC:Diagnoses of Mixed hyperlipidemia, 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, and Prediabetes were pertinent to this visit.      HISTORY OF PRESENT ILLNESS: Patient is a 65 y.o. male established patient who presents today to talk about his chronic health problems and to review his labs that were done prior to the office visit.  The patient was also seen by Dr. Riana Colbert today to provide cardiac clearance prior to his upcoming surgery on 12/31/2020 to do a total left shoulder replacement.    Health Maintenance: Completed    Mixed hyperlipidemia  This is a chronic health problem for this patient who saw Dr. Colbert from cardiology earlier today.  His total cholesterol is 202, triglycerides are great at 110, HDL slightly low at 36 and his LDL is elevated at 144.  She started him on atorvastatin 10 mg daily.  She is going to see him in 6 months I had like to see him back in April with an LDL and lipid panel prior to that to make certain that we have his LDL less than 100.    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  Pt continues on his abstinence from cigarettes.  He has not had any cigs over the last 3 months.    Prediabetes  This is a chronic health problem that is well controlled with current medications and lifestyle measures. He continues to work on lifestyle.  His glucose is okay at 107.      Patient Active Problem List    Diagnosis Date Noted   • Abnormal LFTs (liver function tests) 09/14/2020   • Trigger middle finger of right hand 09/14/2020   • Thrombocytopenia (HCC) 02/24/2020   • Digital mucinous cyst 01/20/2020   • Short-term memory loss 01/20/2020   • Atypical pigmented skin lesion 07/30/2019   • Elevated PSA, less than 10 ng/ml 02/25/2019   • Rib pain on left side 02/16/2018   • Obesity (BMI 30-39.9) 12/11/2017   • Pulmonary nodules 11/01/2016   • Abnormal chest x-ray with multiple lung nodules 10/10/2016   • Hair loss 09/29/2016   • Neck pain 05/04/2016   • Right elbow tendinitis 02/29/2016   •  Nonintractable migraine 08/31/2015   • Peripheral neuropathy 07/09/2015   • Visual disturbances 01/30/2015   • Edema extremities 08/22/2014   • Seborrheic keratoses, inflamed 08/22/2014   • Right ankle swelling 04/02/2014   • Vitamin D deficiency 03/08/2010   • 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 11/06/2009   • Essential hypertension, benign    • Osteoarthritis of multiple joints    • Contact dermatitis and other eczema, due to unspecified cause    • Esophageal reflux    • Carpal tunnel syndrome    • Prediabetes    • Loss of height    • Mixed hyperlipidemia 06/05/2009      Allergies:Erythromycin, Latex, and Other environmental    Current Outpatient Medications   Medication Sig Dispense Refill   • atorvastatin (LIPITOR) 10 MG Tab Take 1 Tab by mouth every day. 100 Tab 3   • ketoconazole (NIZORAL) 2 % shampoo ketoconazole 2 % shampoo   WASH SCALP EEVERY DAY UP TO 5 DAYS A WEEK AS NEEDED. ALLOW TO SIT FOR 5 MINUTES PRIOR RINSING     • triamcinolone acetonide (KENALOG) 0.1 % Cream as needed.     • diclofenac CR (VOLTAREN-XR) 100 MG TABLET SR 24 HR tablet Take 1 Tab by mouth every day. 90 Tab 3   • gabapentin (NEURONTIN) 100 MG Cap Take 1 Cap by mouth every bedtime. 90 Cap 2   • BACTROBAN 2 % Ointment Apply 1 Application to affected area(s) 2 times a day. (Patient taking differently: Apply 1 Application topically as needed.) 60 g 3   • varenicline (CHANTIX CONTINUING MONTH JERI) 1 MG tablet Take 1 Tab by mouth 2 times a day. 60 Tab 3   • omeprazole (PRILOSEC) 20 MG delayed-release capsule Take 1 Cap by mouth every day. 90 Cap 3   • divalproex ER (DEPAKOTE ER) 500 MG TABLET SR 24 HR TAKE 1 TABLET BY MOUTH TWICE A  Tab 3   • Cholecalciferol (CVS VITAMIN D) 2000 UNIT Cap Take 1 Cap by mouth every bedtime. For vitamin D deficiency 100 Cap 3   • tamsulosin (FLOMAX) 0.4 MG capsule Take 0.4 mg by mouth ONE-HALF HOUR AFTER BREAKFAST.       No current facility-administered medications for this visit.        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     - HEENT: Negative for headaches, vision changes, hearing changes, ear pain, ear discharge, rhinorrhea, sinus congestion, sore throat, and neck pain.      - Respiratory: Negative for cough, sputum production, chest congestion, dyspnea, wheezing, and crackles.      - Cardiovascular: Negative for chest pain, palpitations, orthopnea, and bilateral lower extremity edema.     - Gastrointestinal: Negative for heartburn, nausea, vomiting, abdominal pain, hematochezia, melena, diarrhea, constipation, and greasy/foul-smelling stools.     - Genitourinary: Negative for dysuria, polyuria, hematuria, pyuria, urinary urgency, and urinary incontinence.    - Musculoskeletal: Positive for bilateral shoulder pain left greater than right.      - Skin: Negative for rash, itching, cyanotic skin color change.     - Neurological: Negative for dizziness, tingling, tremors, focal sensory deficit, focal weakness and headaches.     - Endo/Heme/Allergies: Does not bruise/bleed easily.     - Psychiatric/Behavioral: Negative for depression, suicidal/homicidal ideation and memory loss.          - NOTE: All other systems reviewed and are negative, except as in HPI.    Exam:    /80 (BP Location: Right arm, Patient  "Position: Sitting, BP Cuff Size: Adult)   Pulse 71   Temp 36.6 °C (97.9 °F) (Temporal)   Resp 18   Ht 1.753 m (5' 9\")   Wt 110.7 kg (244 lb)   SpO2 96%  Body mass index is 36.03 kg/m².    General:  Well nourished, well developed male in NAD  LABS: 12/7/2020: Results reviewed and discussed with the patient, questions answered.    Patient was seen for 25 minutes face to face of which, 20 minutes was spent counseling regarding the above mentioned problems.  Reviewed all of his labs discussed the abnormalities and why he needs to work on prediabetes as well as his lipids.  Assessment/Plan:  1. Mixed hyperlipidemia  Uncontrolled, patient now understands why he needs to be on atorvastatin and will take it.  - Comp Metabolic Panel; Future  - Lipid Profile; Future    2. 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  Patient continues to be abstinent from tobacco for the last 3 months    3. Prediabetes  Uncontrolled but patient working on improving his blood sugars with exercise.  - HEMOGLOBIN A1C; Future        "

## 2020-12-21 NOTE — LETTER
PROCEDURE/SURGERY CLEARANCE FORM      Encounter Date: 12/21/2020    Patient: Mazin Wing  YOB: 1955    CARDIOLOGIST:  Riana Colbert M.D.    REFERRING DOCTOR:  Cortez Cooper    PATIENT does not have  PPM.    PATIENT does not have  AICD.    The above patient is not high risk to have the following procedure/surgery: Total shoulder surgery with general anesthesia.                                           Additional comments: None                 MD Signature   Riana Colbert M.D.

## 2020-12-28 ENCOUNTER — PRE-ADMISSION TESTING (OUTPATIENT)
Dept: ADMISSIONS | Facility: MEDICAL CENTER | Age: 65
DRG: 483 | End: 2020-12-28
Attending: ORTHOPAEDIC SURGERY
Payer: COMMERCIAL

## 2020-12-28 DIAGNOSIS — Z01.812 PRE-OPERATIVE LABORATORY EXAMINATION: ICD-10-CM

## 2020-12-28 LAB
COVID ORDER STATUS COVID19: NORMAL
SARS-COV-2 RNA RESP QL NAA+PROBE: NOTDETECTED
SPECIMEN SOURCE: NORMAL

## 2020-12-28 PROCEDURE — U0003 INFECTIOUS AGENT DETECTION BY NUCLEIC ACID (DNA OR RNA); SEVERE ACUTE RESPIRATORY SYNDROME CORONAVIRUS 2 (SARS-COV-2) (CORONAVIRUS DISEASE [COVID-19]), AMPLIFIED PROBE TECHNIQUE, MAKING USE OF HIGH THROUGHPUT TECHNOLOGIES AS DESCRIBED BY CMS-2020-01-R: HCPCS

## 2020-12-28 PROCEDURE — C9803 HOPD COVID-19 SPEC COLLECT: HCPCS

## 2020-12-31 ENCOUNTER — APPOINTMENT (OUTPATIENT)
Dept: RADIOLOGY | Facility: MEDICAL CENTER | Age: 65
DRG: 483 | End: 2020-12-31
Attending: ORTHOPAEDIC SURGERY
Payer: COMMERCIAL

## 2020-12-31 ENCOUNTER — ANESTHESIA EVENT (OUTPATIENT)
Dept: SURGERY | Facility: MEDICAL CENTER | Age: 65
DRG: 483 | End: 2020-12-31
Payer: COMMERCIAL

## 2020-12-31 ENCOUNTER — ANESTHESIA (OUTPATIENT)
Dept: SURGERY | Facility: MEDICAL CENTER | Age: 65
DRG: 483 | End: 2020-12-31
Payer: COMMERCIAL

## 2020-12-31 ENCOUNTER — HOSPITAL ENCOUNTER (INPATIENT)
Facility: MEDICAL CENTER | Age: 65
LOS: 1 days | DRG: 483 | End: 2020-12-31
Attending: ORTHOPAEDIC SURGERY | Admitting: ORTHOPAEDIC SURGERY
Payer: COMMERCIAL

## 2020-12-31 VITALS
DIASTOLIC BLOOD PRESSURE: 82 MMHG | HEIGHT: 69 IN | HEART RATE: 68 BPM | TEMPERATURE: 97.3 F | WEIGHT: 237.66 LBS | SYSTOLIC BLOOD PRESSURE: 139 MMHG | OXYGEN SATURATION: 95 % | BODY MASS INDEX: 35.2 KG/M2 | RESPIRATION RATE: 18 BRPM

## 2020-12-31 PROCEDURE — 501838 HCHG SUTURE GENERAL: Performed by: ORTHOPAEDIC SURGERY

## 2020-12-31 PROCEDURE — 500151 HCHG CANNULA, THRDED 8.4: Performed by: ORTHOPAEDIC SURGERY

## 2020-12-31 PROCEDURE — 97165 OT EVAL LOW COMPLEX 30 MIN: CPT

## 2020-12-31 PROCEDURE — 160029 HCHG SURGERY MINUTES - 1ST 30 MINS LEVEL 4: Performed by: ORTHOPAEDIC SURGERY

## 2020-12-31 PROCEDURE — 500562 HCHG FIBERWIRE: Performed by: ORTHOPAEDIC SURGERY

## 2020-12-31 PROCEDURE — 700111 HCHG RX REV CODE 636 W/ 250 OVERRIDE (IP): Performed by: ORTHOPAEDIC SURGERY

## 2020-12-31 PROCEDURE — 500367 HCHG DRAIN KIT, HEMOVAC: Performed by: ORTHOPAEDIC SURGERY

## 2020-12-31 PROCEDURE — 700111 HCHG RX REV CODE 636 W/ 250 OVERRIDE (IP): Performed by: ANESTHESIOLOGY

## 2020-12-31 PROCEDURE — 700105 HCHG RX REV CODE 258: Performed by: ORTHOPAEDIC SURGERY

## 2020-12-31 PROCEDURE — 500028 HCHG ARTHROWAND TURBOVAC 3.5/90 SUCT.: Performed by: ORTHOPAEDIC SURGERY

## 2020-12-31 PROCEDURE — 770006 HCHG ROOM/CARE - MED/SURG/GYN SEMI*

## 2020-12-31 PROCEDURE — 160048 HCHG OR STATISTICAL LEVEL 1-5: Performed by: ORTHOPAEDIC SURGERY

## 2020-12-31 PROCEDURE — L8699 PROSTHETIC IMPLANT NOS: HCPCS | Performed by: ORTHOPAEDIC SURGERY

## 2020-12-31 PROCEDURE — 160036 HCHG PACU - EA ADDL 30 MINS PHASE I: Performed by: ORTHOPAEDIC SURGERY

## 2020-12-31 PROCEDURE — 160041 HCHG SURGERY MINUTES - EA ADDL 1 MIN LEVEL 4: Performed by: ORTHOPAEDIC SURGERY

## 2020-12-31 PROCEDURE — 64415 NJX AA&/STRD BRCH PLXS IMG: CPT | Performed by: ORTHOPAEDIC SURGERY

## 2020-12-31 PROCEDURE — 700102 HCHG RX REV CODE 250 W/ 637 OVERRIDE(OP): Performed by: ANESTHESIOLOGY

## 2020-12-31 PROCEDURE — 502581 HCHG PACK, SHOULDER ARTHROSCOPY: Performed by: ORTHOPAEDIC SURGERY

## 2020-12-31 PROCEDURE — 502000 HCHG MISC OR IMPLANTS RC 0278: Performed by: ORTHOPAEDIC SURGERY

## 2020-12-31 PROCEDURE — A9270 NON-COVERED ITEM OR SERVICE: HCPCS | Performed by: ORTHOPAEDIC SURGERY

## 2020-12-31 PROCEDURE — A9270 NON-COVERED ITEM OR SERVICE: HCPCS | Performed by: ANESTHESIOLOGY

## 2020-12-31 PROCEDURE — 700101 HCHG RX REV CODE 250: Performed by: ORTHOPAEDIC SURGERY

## 2020-12-31 PROCEDURE — 3E0T3BZ INTRODUCTION OF ANESTHETIC AGENT INTO PERIPHERAL NERVES AND PLEXI, PERCUTANEOUS APPROACH: ICD-10-PCS | Performed by: ANESTHESIOLOGY

## 2020-12-31 PROCEDURE — 160009 HCHG ANES TIME/MIN: Performed by: ORTHOPAEDIC SURGERY

## 2020-12-31 PROCEDURE — 160002 HCHG RECOVERY MINUTES (STAT): Performed by: ORTHOPAEDIC SURGERY

## 2020-12-31 PROCEDURE — C1776 JOINT DEVICE (IMPLANTABLE): HCPCS | Performed by: ORTHOPAEDIC SURGERY

## 2020-12-31 PROCEDURE — 700102 HCHG RX REV CODE 250 W/ 637 OVERRIDE(OP): Performed by: ORTHOPAEDIC SURGERY

## 2020-12-31 PROCEDURE — 700101 HCHG RX REV CODE 250: Performed by: ANESTHESIOLOGY

## 2020-12-31 PROCEDURE — 73030 X-RAY EXAM OF SHOULDER: CPT | Mod: LT

## 2020-12-31 PROCEDURE — 0RRK0JZ REPLACEMENT OF LEFT SHOULDER JOINT WITH SYNTHETIC SUBSTITUTE, OPEN APPROACH: ICD-10-PCS | Performed by: ORTHOPAEDIC SURGERY

## 2020-12-31 PROCEDURE — 502578 HCHG PACK, TOTAL HIP: Performed by: ORTHOPAEDIC SURGERY

## 2020-12-31 PROCEDURE — 160035 HCHG PACU - 1ST 60 MINS PHASE I: Performed by: ORTHOPAEDIC SURGERY

## 2020-12-31 DEVICE — IMPLANTABLE DEVICE: Type: IMPLANTABLE DEVICE | Site: SHOULDER | Status: FUNCTIONAL

## 2020-12-31 DEVICE — BONE CEMENT SIMPLEX ANTIBIO - (10/PK): Type: IMPLANTABLE DEVICE | Site: SHOULDER | Status: FUNCTIONAL

## 2020-12-31 RX ORDER — AMOXICILLIN 250 MG
1 CAPSULE ORAL
Status: DISCONTINUED | OUTPATIENT
Start: 2020-12-31 | End: 2020-12-31 | Stop reason: HOSPADM

## 2020-12-31 RX ORDER — POLYETHYLENE GLYCOL 3350 17 G/17G
1 POWDER, FOR SOLUTION ORAL 2 TIMES DAILY PRN
Status: DISCONTINUED | OUTPATIENT
Start: 2020-12-31 | End: 2020-12-31 | Stop reason: HOSPADM

## 2020-12-31 RX ORDER — GABAPENTIN 100 MG/1
100 CAPSULE ORAL
Status: DISCONTINUED | OUTPATIENT
Start: 2020-12-31 | End: 2020-12-31 | Stop reason: HOSPADM

## 2020-12-31 RX ORDER — DIPHENHYDRAMINE HYDROCHLORIDE 50 MG/ML
25 INJECTION INTRAMUSCULAR; INTRAVENOUS EVERY 6 HOURS PRN
Status: DISCONTINUED | OUTPATIENT
Start: 2020-12-31 | End: 2020-12-31 | Stop reason: HOSPADM

## 2020-12-31 RX ORDER — VARENICLINE TARTRATE 1 MG/1
1 TABLET, FILM COATED ORAL
Status: DISCONTINUED | OUTPATIENT
Start: 2020-12-31 | End: 2020-12-31

## 2020-12-31 RX ORDER — ONDANSETRON 2 MG/ML
INJECTION INTRAMUSCULAR; INTRAVENOUS PRN
Status: DISCONTINUED | OUTPATIENT
Start: 2020-12-31 | End: 2020-12-31 | Stop reason: SURG

## 2020-12-31 RX ORDER — OXYCODONE HYDROCHLORIDE 5 MG/1
5 TABLET ORAL
Status: DISCONTINUED | OUTPATIENT
Start: 2020-12-31 | End: 2020-12-31 | Stop reason: HOSPADM

## 2020-12-31 RX ORDER — DIVALPROEX SODIUM 250 MG/1
500 TABLET, EXTENDED RELEASE ORAL DAILY
Status: DISCONTINUED | OUTPATIENT
Start: 2021-01-01 | End: 2020-12-31 | Stop reason: HOSPADM

## 2020-12-31 RX ORDER — DEXAMETHASONE SODIUM PHOSPHATE 4 MG/ML
INJECTION, SOLUTION INTRA-ARTICULAR; INTRALESIONAL; INTRAMUSCULAR; INTRAVENOUS; SOFT TISSUE PRN
Status: DISCONTINUED | OUTPATIENT
Start: 2020-12-31 | End: 2020-12-31 | Stop reason: SURG

## 2020-12-31 RX ORDER — MEPERIDINE HYDROCHLORIDE 25 MG/ML
12.5 INJECTION INTRAMUSCULAR; INTRAVENOUS; SUBCUTANEOUS
Status: DISCONTINUED | OUTPATIENT
Start: 2020-12-31 | End: 2020-12-31 | Stop reason: HOSPADM

## 2020-12-31 RX ORDER — DEXAMETHASONE SODIUM PHOSPHATE 4 MG/ML
4 INJECTION, SOLUTION INTRA-ARTICULAR; INTRALESIONAL; INTRAMUSCULAR; INTRAVENOUS; SOFT TISSUE
Status: DISCONTINUED | OUTPATIENT
Start: 2020-12-31 | End: 2020-12-31 | Stop reason: HOSPADM

## 2020-12-31 RX ORDER — HALOPERIDOL 5 MG/ML
1 INJECTION INTRAMUSCULAR EVERY 6 HOURS PRN
Status: DISCONTINUED | OUTPATIENT
Start: 2020-12-31 | End: 2020-12-31 | Stop reason: HOSPADM

## 2020-12-31 RX ORDER — TRANEXAMIC ACID 100 MG/ML
INJECTION, SOLUTION INTRAVENOUS PRN
Status: DISCONTINUED | OUTPATIENT
Start: 2020-12-31 | End: 2020-12-31 | Stop reason: SURG

## 2020-12-31 RX ORDER — DOCUSATE SODIUM 100 MG/1
100 CAPSULE, LIQUID FILLED ORAL 2 TIMES DAILY
Status: DISCONTINUED | OUTPATIENT
Start: 2020-12-31 | End: 2020-12-31 | Stop reason: HOSPADM

## 2020-12-31 RX ORDER — SCOLOPAMINE TRANSDERMAL SYSTEM 1 MG/1
1 PATCH, EXTENDED RELEASE TRANSDERMAL
Status: DISCONTINUED | OUTPATIENT
Start: 2020-12-31 | End: 2020-12-31 | Stop reason: HOSPADM

## 2020-12-31 RX ORDER — BISACODYL 10 MG
10 SUPPOSITORY, RECTAL RECTAL
Status: DISCONTINUED | OUTPATIENT
Start: 2020-12-31 | End: 2020-12-31 | Stop reason: HOSPADM

## 2020-12-31 RX ORDER — ATORVASTATIN CALCIUM 10 MG/1
10 TABLET, FILM COATED ORAL DAILY
Status: DISCONTINUED | OUTPATIENT
Start: 2020-12-31 | End: 2020-12-31 | Stop reason: HOSPADM

## 2020-12-31 RX ORDER — ONDANSETRON 2 MG/ML
4 INJECTION INTRAMUSCULAR; INTRAVENOUS EVERY 4 HOURS PRN
Status: DISCONTINUED | OUTPATIENT
Start: 2020-12-31 | End: 2020-12-31 | Stop reason: HOSPADM

## 2020-12-31 RX ORDER — OXYCODONE HCL 5 MG/5 ML
5 SOLUTION, ORAL ORAL
Status: DISCONTINUED | OUTPATIENT
Start: 2020-12-31 | End: 2020-12-31 | Stop reason: HOSPADM

## 2020-12-31 RX ORDER — CEFAZOLIN SODIUM 1 G/3ML
INJECTION, POWDER, FOR SOLUTION INTRAMUSCULAR; INTRAVENOUS PRN
Status: DISCONTINUED | OUTPATIENT
Start: 2020-12-31 | End: 2020-12-31 | Stop reason: SURG

## 2020-12-31 RX ORDER — CELECOXIB 200 MG/1
200 CAPSULE ORAL ONCE
Status: COMPLETED | OUTPATIENT
Start: 2020-12-31 | End: 2020-12-31

## 2020-12-31 RX ORDER — BUPIVACAINE HYDROCHLORIDE 5 MG/ML
INJECTION, SOLUTION EPIDURAL; INTRACAUDAL
Status: COMPLETED | OUTPATIENT
Start: 2020-12-31 | End: 2020-12-31

## 2020-12-31 RX ORDER — OMEPRAZOLE 20 MG/1
20 CAPSULE, DELAYED RELEASE ORAL DAILY
Status: DISCONTINUED | OUTPATIENT
Start: 2021-01-01 | End: 2020-12-31 | Stop reason: HOSPADM

## 2020-12-31 RX ORDER — GABAPENTIN 100 MG/1
100 CAPSULE ORAL ONCE
Status: COMPLETED | OUTPATIENT
Start: 2020-12-31 | End: 2020-12-31

## 2020-12-31 RX ORDER — KETOROLAC TROMETHAMINE 30 MG/ML
15 INJECTION, SOLUTION INTRAMUSCULAR; INTRAVENOUS EVERY 6 HOURS
Status: DISCONTINUED | OUTPATIENT
Start: 2020-12-31 | End: 2020-12-31 | Stop reason: HOSPADM

## 2020-12-31 RX ORDER — MIDAZOLAM HYDROCHLORIDE 1 MG/ML
INJECTION INTRAMUSCULAR; INTRAVENOUS PRN
Status: DISCONTINUED | OUTPATIENT
Start: 2020-12-31 | End: 2020-12-31 | Stop reason: SURG

## 2020-12-31 RX ORDER — SODIUM CHLORIDE 9 MG/ML
INJECTION, SOLUTION INTRAVENOUS CONTINUOUS
Status: DISCONTINUED | OUTPATIENT
Start: 2020-12-31 | End: 2020-12-31 | Stop reason: HOSPADM

## 2020-12-31 RX ORDER — OXYCODONE HYDROCHLORIDE 10 MG/1
10 TABLET ORAL
Status: DISCONTINUED | OUTPATIENT
Start: 2020-12-31 | End: 2020-12-31 | Stop reason: HOSPADM

## 2020-12-31 RX ORDER — ACETAMINOPHEN 325 MG/1
650 TABLET ORAL EVERY 6 HOURS
Status: DISCONTINUED | OUTPATIENT
Start: 2020-12-31 | End: 2020-12-31 | Stop reason: HOSPADM

## 2020-12-31 RX ORDER — AMOXICILLIN 250 MG
1 CAPSULE ORAL NIGHTLY
Status: DISCONTINUED | OUTPATIENT
Start: 2020-12-31 | End: 2020-12-31 | Stop reason: HOSPADM

## 2020-12-31 RX ORDER — OXYCODONE HCL 5 MG/5 ML
10 SOLUTION, ORAL ORAL
Status: DISCONTINUED | OUTPATIENT
Start: 2020-12-31 | End: 2020-12-31 | Stop reason: HOSPADM

## 2020-12-31 RX ORDER — DIPHENHYDRAMINE HYDROCHLORIDE 50 MG/ML
12.5 INJECTION INTRAMUSCULAR; INTRAVENOUS
Status: DISCONTINUED | OUTPATIENT
Start: 2020-12-31 | End: 2020-12-31 | Stop reason: HOSPADM

## 2020-12-31 RX ORDER — ZOLPIDEM TARTRATE 5 MG/1
5 TABLET ORAL NIGHTLY PRN
Status: DISCONTINUED | OUTPATIENT
Start: 2021-01-01 | End: 2020-12-31 | Stop reason: HOSPADM

## 2020-12-31 RX ORDER — LIDOCAINE HYDROCHLORIDE 20 MG/ML
INJECTION, SOLUTION EPIDURAL; INFILTRATION; INTRACAUDAL; PERINEURAL PRN
Status: DISCONTINUED | OUTPATIENT
Start: 2020-12-31 | End: 2020-12-31 | Stop reason: SURG

## 2020-12-31 RX ORDER — ROCURONIUM BROMIDE 10 MG/ML
INJECTION, SOLUTION INTRAVENOUS PRN
Status: DISCONTINUED | OUTPATIENT
Start: 2020-12-31 | End: 2020-12-31 | Stop reason: SURG

## 2020-12-31 RX ORDER — HALOPERIDOL 5 MG/ML
1 INJECTION INTRAMUSCULAR
Status: DISCONTINUED | OUTPATIENT
Start: 2020-12-31 | End: 2020-12-31 | Stop reason: HOSPADM

## 2020-12-31 RX ORDER — SODIUM CHLORIDE, SODIUM LACTATE, POTASSIUM CHLORIDE, CALCIUM CHLORIDE 600; 310; 30; 20 MG/100ML; MG/100ML; MG/100ML; MG/100ML
INJECTION, SOLUTION INTRAVENOUS CONTINUOUS
Status: DISCONTINUED | OUTPATIENT
Start: 2020-12-31 | End: 2020-12-31 | Stop reason: HOSPADM

## 2020-12-31 RX ORDER — MORPHINE SULFATE 4 MG/ML
4 INJECTION, SOLUTION INTRAMUSCULAR; INTRAVENOUS
Status: DISCONTINUED | OUTPATIENT
Start: 2020-12-31 | End: 2020-12-31 | Stop reason: HOSPADM

## 2020-12-31 RX ORDER — HYDRALAZINE HYDROCHLORIDE 20 MG/ML
5 INJECTION INTRAMUSCULAR; INTRAVENOUS
Status: DISCONTINUED | OUTPATIENT
Start: 2020-12-31 | End: 2020-12-31 | Stop reason: HOSPADM

## 2020-12-31 RX ORDER — TRAMADOL HYDROCHLORIDE 50 MG/1
50 TABLET ORAL EVERY 6 HOURS
Status: DISCONTINUED | OUTPATIENT
Start: 2020-12-31 | End: 2020-12-31 | Stop reason: HOSPADM

## 2020-12-31 RX ORDER — ONDANSETRON 2 MG/ML
4 INJECTION INTRAMUSCULAR; INTRAVENOUS
Status: DISCONTINUED | OUTPATIENT
Start: 2020-12-31 | End: 2020-12-31 | Stop reason: HOSPADM

## 2020-12-31 RX ORDER — ENEMA 19; 7 G/133ML; G/133ML
1 ENEMA RECTAL
Status: DISCONTINUED | OUTPATIENT
Start: 2020-12-31 | End: 2020-12-31 | Stop reason: HOSPADM

## 2020-12-31 RX ADMIN — FENTANYL CITRATE 100 MCG: 50 INJECTION, SOLUTION INTRAMUSCULAR; INTRAVENOUS at 12:29

## 2020-12-31 RX ADMIN — ACETAMINOPHEN 650 MG: 325 TABLET, FILM COATED ORAL at 17:56

## 2020-12-31 RX ADMIN — CEFAZOLIN 2 G: 1 INJECTION, POWDER, FOR SOLUTION INTRAVENOUS at 11:26

## 2020-12-31 RX ADMIN — SODIUM CHLORIDE, POTASSIUM CHLORIDE, SODIUM LACTATE AND CALCIUM CHLORIDE: 600; 310; 30; 20 INJECTION, SOLUTION INTRAVENOUS at 10:14

## 2020-12-31 RX ADMIN — TRANEXAMIC ACID 1000 MG: 100 INJECTION, SOLUTION INTRAVENOUS at 11:23

## 2020-12-31 RX ADMIN — ROCURONIUM BROMIDE 20 MG: 10 INJECTION, SOLUTION INTRAVENOUS at 12:11

## 2020-12-31 RX ADMIN — KETOROLAC TROMETHAMINE 15 MG: 30 INJECTION, SOLUTION INTRAMUSCULAR at 17:56

## 2020-12-31 RX ADMIN — PROPOFOL 200 MG: 10 INJECTION, EMULSION INTRAVENOUS at 11:26

## 2020-12-31 RX ADMIN — TRAMADOL HYDROCHLORIDE 50 MG: 50 TABLET, FILM COATED ORAL at 17:55

## 2020-12-31 RX ADMIN — CELECOXIB 200 MG: 200 CAPSULE ORAL at 10:39

## 2020-12-31 RX ADMIN — ROCURONIUM BROMIDE 15 MG: 10 INJECTION, SOLUTION INTRAVENOUS at 12:51

## 2020-12-31 RX ADMIN — GABAPENTIN 100 MG: 100 CAPSULE ORAL at 10:39

## 2020-12-31 RX ADMIN — BUPIVACAINE HYDROCHLORIDE 10 ML: 5 INJECTION, SOLUTION EPIDURAL; INTRACAUDAL; PERINEURAL at 11:05

## 2020-12-31 RX ADMIN — BUPIVACAINE 10 ML: 13.3 INJECTION, SUSPENSION, LIPOSOMAL INFILTRATION at 11:05

## 2020-12-31 RX ADMIN — MIDAZOLAM HYDROCHLORIDE 2 MG: 1 INJECTION, SOLUTION INTRAMUSCULAR; INTRAVENOUS at 11:19

## 2020-12-31 RX ADMIN — FENTANYL CITRATE 100 MCG: 50 INJECTION, SOLUTION INTRAMUSCULAR; INTRAVENOUS at 11:26

## 2020-12-31 RX ADMIN — ONDANSETRON 4 MG: 2 INJECTION INTRAMUSCULAR; INTRAVENOUS at 11:39

## 2020-12-31 RX ADMIN — DOCUSATE SODIUM 100 MG: 100 CAPSULE, LIQUID FILLED ORAL at 17:56

## 2020-12-31 RX ADMIN — ROCURONIUM BROMIDE 30 MG: 10 INJECTION, SOLUTION INTRAVENOUS at 11:26

## 2020-12-31 RX ADMIN — EPHEDRINE SULFATE 10 MG: 50 INJECTION INTRAMUSCULAR; INTRAVENOUS; SUBCUTANEOUS at 12:16

## 2020-12-31 RX ADMIN — SODIUM CHLORIDE, POTASSIUM CHLORIDE, SODIUM LACTATE AND CALCIUM CHLORIDE: 600; 310; 30; 20 INJECTION, SOLUTION INTRAVENOUS at 13:10

## 2020-12-31 RX ADMIN — LIDOCAINE HYDROCHLORIDE 60 MG: 20 INJECTION, SOLUTION EPIDURAL; INFILTRATION; INTRACAUDAL; PERINEURAL at 11:26

## 2020-12-31 RX ADMIN — WATER 15 ML: 100 IRRIGANT IRRIGATION at 10:14

## 2020-12-31 RX ADMIN — DEXAMETHASONE SODIUM PHOSPHATE 4 MG: 4 INJECTION, SOLUTION INTRAMUSCULAR; INTRAVENOUS at 11:39

## 2020-12-31 ASSESSMENT — ACTIVITIES OF DAILY LIVING (ADL): TOILETING: INDEPENDENT

## 2020-12-31 ASSESSMENT — COGNITIVE AND FUNCTIONAL STATUS - GENERAL
DAILY ACTIVITIY SCORE: 20
HELP NEEDED FOR BATHING: A LITTLE
DRESSING REGULAR UPPER BODY CLOTHING: A LOT
DRESSING REGULAR LOWER BODY CLOTHING: A LITTLE
SUGGESTED CMS G CODE MODIFIER DAILY ACTIVITY: CJ

## 2020-12-31 ASSESSMENT — FIBROSIS 4 INDEX: FIB4 SCORE: 2.17

## 2020-12-31 ASSESSMENT — PAIN SCALES - GENERAL: PAIN_LEVEL: 0

## 2020-12-31 ASSESSMENT — PAIN DESCRIPTION - PAIN TYPE: TYPE: CHRONIC PAIN

## 2020-12-31 NOTE — OR NURSING
1430: Rcvd report from REBECCA Appiah and assumed care. Pt resting comfortably in the bed, no pain or nausea, awake and alert, on room air, but saturations are occasionally dropping below 90%, down to 86-88%. Xray is at bedside, and pt might be holding his breath more related to that, will continue to monitor and see if he is able to tolerate room air.  1445: Still no pain or nausea, awake and alert, tolerating room air, O2 saturations are 92-97%. Meets criteria to transfer to the floor, waiting on RN to take report on [facility] floor.   1500: Pt still not having any pain or nausea, awake and alert, tolerating room air. Report called to REBECCA Menard and pt readied for transfer.

## 2020-12-31 NOTE — ANESTHESIA PREPROCEDURE EVALUATION
66 y/o male w/ DJD of Left shoulder    Relevant Problems   No relevant active problems   Medical History    Past Medical History     Date Comments   Mixed hyperlipidemia [E78.2]     Prediabetes [R73.03]  uncontrolled   Loss of height [R29.890]  Loss of 1.5 inches 2009,   Epididymitis [N45.1]  chronic on left   Tinnitus [388.3]  chronic with hearing loss   Contact dermatitis and other eczema, due to unspecified cause [L25.9]  Chronic, controlled with meds   Carpal tunnel syndrome [G56.00]  bilateral, uncontrolled   Osteoarthrosis involving, or with mention of more than one site, but not specified as generalized, multiple sites [M15.9]     Esophageal reflux [K21.9]     Arthritis [M19.90]     Unspecified vitamin D deficiency [E55.9] 3/8/2010    Snoring [R06.83]     Edema extremities [R60.0] 8/22/2014    Seborrheic keratoses, inflamed [L82.0] 8/22/2014    Cholesterol blood decreased [R79.89]     Heart burn [R12]     Pain [R52] 9/3/14 left knee   Visual disturbances [H53.9] 1/30/2015    Elevated PSA, less than 10 ng/ml [R97.20] 2/25/2019    Atypical pigmented skin lesion [L81.9] 7/30/2019    Short-term memory loss [R41.3] 1/20/2020    Thrombocytopenia (HCC) [D69.6] 2/24/2020    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 [F17.201] 11/6/2009 Quit 12/09, then restarted    Abnormal LFTs (liver function tests) [R94.5] 9/14/2020    Anesthesia [R20.0]  occasionally wakes up agitated/ anxious   Seizure (HCC) [R56.9] 2013 seizure is visual changes only, none since medication started in 2013      Surgical History    Past Surgical History    Past Surgical History Laterality Last Occurrence Comments   TOENAIL REMOVAL [86.23] Not specified 2008 bilateral great toes   CERVICAL DISK AND FUSION ANTERIOR [81.02] Not specified 1/26/2009 Performed by AMINA NERI at SURGERY [facility]S   HEMORRHOIDECTOMY [49.46] Not specified 1980 None   ARTHROSCOPY, KNEE [UF67725] Not specified 1989 right   CARPAL TUNNEL ENDOSCOPIC [04.43B] Not  specified 12/1/2009 Performed by RONNIE MAURICIO at SURGERY [facility]S   KNEE ARTHROPLASTY TOTAL [81.40] Not specified 3/7/2011 right   KNEE ARTHROPLASTY TOTAL [81.40] Not specified 9/15/2014 Performed by Cesar Abreu M.D. at SURGERY [facility]   VASECTOMY [63.73] Not specified 1979 None   Social History    Tobacco History    Smoking Status   Former Smoker Quit date   9/1/2020 Smoking Frequency   1 pack per day for 43 years (43 pack years) Smoking Tobacco Type   Cigarettes   Smokeless Tobacco Use   Never Used   Alcohol History    Alcohol Use Status   Not Currently Drinks/Week   0 Standard drinks or equivalent per week Amount   0.0 oz of alcohol per week Comment   rare   Drug Use    Drug Use Status   No         Physical Exam    Airway   Mallampati: I  TM distance: >3 FB  Neck ROM: full       Cardiovascular - normal exam  Rhythm: regular  Rate: normal  (-) murmur     Dental - normal exam           Pulmonary - normal exam  Breath sounds clear to auscultation     Abdominal    Neurological - normal exam               Anesthesia Plan    ASA 2       Plan - general and peripheral nerve block     Peripheral nerve block will be post-op pain control  Airway plan will be LMA      Plan Factors:   Patient was not previously instructed to abstain from smoking on day of procedure.  Patient did not smoke on day of procedure.      Induction: intravenous    Postoperative Plan: Postoperative administration of opioids is intended.    Pertinent diagnostic labs and testing reviewed    Informed Consent:    Anesthetic plan and risks discussed with patient.    Use of blood products discussed with: patient whom consented to blood products.

## 2020-12-31 NOTE — ANESTHESIA TIME REPORT
Anesthesia Start and Stop Event Times     Date Time Event    12/31/2020 11:12 AM Ready for Procedure    12/31/2020 11:19 AM Anesthesia Start    12/31/2020 01:33 PM Anesthesia Stop        Responsible Staff  12/31/20    Name Role Begin End    Casey Aguila M.D. Anesthesiologist 12/31/20 11:19 AM 12/31/20 01:33 PM        Preop Diagnosis (Free Text):  Pre-op Diagnosis     LOCALIZED PRIMARY OSTEOARTHRITIS OF THE SHOULDER REGION        Preop Diagnosis (Codes):    Post op Diagnosis  Localized primary osteoarthritis of left shoulder region      Premium Reason  Non-Premium    Comments: Left TSA

## 2020-12-31 NOTE — OR NURSING
Pt allergies and NPO status verified, home meds reconcilled. Belongings secured. Pt verbalizes understanding of the pain scale, expected course of stay, and plan of care.  Surgical site verified with pt.  IV access established.  Sequentials and TEDs  placed on legs.

## 2020-12-31 NOTE — OR SURGEON
Immediate Post OP Note    PreOp Diagnosis: left shoulder end stage OA, biceps tendonitis and labral tearing     PostOp Diagnosis: same     Procedure(s):  ARTHROPLASTY, SHOULDER, TOTAL - Wound Class: Clean  TENODESIS, BICEPS - Wound Class: Clean    Surgeon(s):  Ric Cooper M.D.    Anesthesiologist/Type of Anesthesia:  Anesthesiologist: Casey Aguila M.D./General    Surgical Staff:  Circulator: Layo Titus R.N.  Scrub Person: Dameon Melton; Lamberto Melo  First Assist: Tashia Darden [facility]    Specimens removed if any:  * No specimens in log *    Estimated Blood Loss: 150cc    Findings: left shoulder OA, labral tearing and biceps tendonitis     Complications: no apparent         12/31/2020 1:37 PM Ric Cooper M.D.

## 2020-12-31 NOTE — OP REPORT
DATE OF SERVICE:  12/31/2020     PREOPERATIVE DIAGNOSIS:  Left shoulder end-stage osteoarthritis with labral   tearing and biceps tendinitis.     POSTOPERATIVE DIAGNOSIS:  Left shoulder end-stage osteoarthritis with labral   tearing and biceps tendinitis.     PROCEDURE PERFORMED:  Left total shoulder arthroplasty.     IMPLANTS USED:  Aequalis Tornier total shoulder with a 6 C stem, 50 x 16 high   offset head and a 50 large pegged glenoid, which was cemented.     SURGEON:  Ric Cooper MD     ASSISTANT:  Tashia Darden, certified first assist.     ANESTHESIA:  General and block.     ANESTHESIOLOGIST:  Casey Aguila MD     ESTIMATED BLOOD LOSS:  150 mL     COMPLICATIONS:  No apparent.     DISPOSITION:  PACU.     CONDITION:  Stable.     INDICATIONS:  The patient is a 65-year-old male with ongoing shoulder pain,   had extensive conservative workup, injections, physical therapy.  Symptoms are   not improved.  MRI showed intact rotator cuff with labral tearing, complete   cartilage loss on the glenoid and the humeral head.  We discussed the risks,   benefits, rationale of a total shoulder arthroplasty and biceps tenodesis.    Risks include but not limited to infection, neurovascular injury, incomplete   relief of symptoms, instability, need for further surgery, inability to return   to pre-disease state, DVT, PE, cardiac risks, complications of anesthesia.    Informed consent was signed and placed in the chart.  All of his questions   were answered.  No guarantees implied or given.     TECHNIQUE:  Both the patient and I agreed to the correct operative extremity.    The left shoulder was signed and marked in preoperative holding.  He received   2 g IV Ancef prophylaxis.  I consulted Dr. Casey Aguila of anesthesia   regarding perioperative pain management.  He consented the patient for an   interscalene block, which was carried out under sterile technique with   ultrasound guidance in preoperative  holding.  The patient was taken to the   operative suite.  After adequate anesthesia, a timeout was taken by all in the   room to identify the correct patient and procedure.  Left upper extremity was   sterilely prepped and draped in standard fashion.  He was placed in the beach   chair position with prominences well padded.     Standard deltopectoral approach was used.  The brachiocephalic vein was   identified, protected and retracted laterally.  The upper border of the   pectoralis tendon was elevated off the humerus.  The biceps tendon was   encountered at this spot.  It had significant synovitis and inflammation.  A   #2 FiberWire was used with six [id number] to tenodese the biceps tendon   to the upper border of the pectoralis tendon.  Groove was then followed up.    Subscapularis was taken off the lesser tuberosity and tagged for later repair.    Significant osteophytes were removed. Cut of the humeral head was done at   the patient's native version.  Trialing was then done on the humerus.  It was   broached up to a 6 C.  Plate was placed to cover the proximal humerus.    Axillary nerve was identified and protected.  A 360 release of the   subscapularis was performed.  Circumferential excision of the labrum and the   biceps tendon was completed.  There was no cartilage on the glenoid or the   humeral head.  Retractors were placed posteriorly and anteriorly.  A central   hole was drilled in the glenoid.  It was sized to a 50 large.  Guide was used   to drill 3 peripheral pegs.  Cement with antibiotics was mixed at the back   table, placed in the 3 peripheral pegs.  Glenoid was seated.  Cement was   allowed to harden.  Attention was then directed to the glenoid head.  A 50 x   16 high offset head was appropriate.  Had good range of motion, did not put   significant tension on the rotator cuff.  Three holes were drilled in the   lesser tuberosity for subscapularis repair.  The final component was  assembled   at the back table.  The final component was implanted after three #2   FiberWires that had been placed for subscapularis repair.  Wound was then   irrigated with dilute Betadine solution, which was allowed to dry and 3 liter   of sterile saline.  The subscapularis repair was done with modified   Chencho-Horacio stitches and then the tissue from lateral of the biceps groove was   oversewed to the subscapularis tendon to secure the repair and take tension   off of it.  Hemostasis was observed.  Subcutaneous tissue was closed with 2-0   Vicryl and staples for the skin.  Aquacel Silver based dressing was placed.    The patient was transferred to recovery in stable condition.  Counts were   correct.  No apparent complications.  In recovery, he had 2+ radial and ulnar   pulse.  Fingers were pink, warm, brisk capillary refill.     Tashia Darden, a certified first assist, was essential for successful   completion of the case.  It could not be done without her.        ______________________________  MD GANESH Chavez/DEBI    DD:  12/31/2020 13:44  DT:  12/31/2020 15:15    Job#:  [id number]

## 2020-12-31 NOTE — ANESTHESIA PROCEDURE NOTES
Peripheral Block    Date/Time: 12/31/2020 11:05 AM  Performed by: Casey Aguila M.D.  Authorized by: Casey Aguila M.D.     Patient Location:  Pre-op  Start Time:  12/31/2020 11:05 AM  End Time:  12/31/2020 11:09 AM  Reason for Block: at surgeon's request and post-op pain management ONLY    patient identified, IV checked, site marked, risks and benefits discussed, surgical consent, monitors and equipment checked, pre-op evaluation and timeout performed    Patient Position:  Supine  Prep: ChloraPrep    Monitoring:  Heart rate, continuous pulse ox and cardiac monitor  Block Region:  Upper Extremity  Upper Extremity - Block Type:  BRACHIAL PLEXUS block, Interscalene approach    Laterality:  Left  Procedures: ultrasound guided  Image captured, interpreted and electronically stored.  Local Infiltration:  Lidocaine  Strength:  2 %  Dose:  3 ml  Block Type:  Single-shot  Needle Length:  100mm  Needle Gauge:  21 G  Needle Localization:  Ultrasound guidance  Injection Assessment:  Negative aspiration for heme, no paresthesia on injection, incremental injection and local visualized surrounding nerve on ultrasound  Evidence of intravascular injection: No     US Guided Left Interscalene Brachial Plexus Block   US transducer placed on the left side of the neck in oblique plane approximately at the level of C6.  Left Anterior and Middle Scalene (MSM) muscles identified with nerve trunks identified between the muscles.  Needle inserted lateral to probe and advanced under direct visualization through the MSM into a perineural position.  After negative aspiration, 10 ml of 0.5% Bupivacaine and 10 ml of Exparel were injected with ease and visualized surrounding the nerve trunks.

## 2020-12-31 NOTE — OR NURSING
Pt arrived to PACU in stable condition.  Connected to CMU in NSR.  O2 sat 97% on 6 liters O2 face mask with an oral airway in place. No s/o distress seen. Drsg to left shoulder not visualized. Surgical towels and cold therapy to site. Arm in abductor sling.     1350  Updated wife on pt's status. Pt responsive. Oral airway removed.     1400  He is awake and alert. Denies pain and nausea at this time.

## 2020-12-31 NOTE — PROGRESS NOTES
Received report from PACU RN and assumed care of patient at 1515. Patient is AXO4 denies SOB, pain, N/V or further needs at this time. Patient is able to move fingers and has 2+ radial pulses. Polar ice in place. Call light in reach. Bed in lowest locked position. Hourly rounding to continue.

## 2020-12-31 NOTE — ANESTHESIA PROCEDURE NOTES
Airway    Date/Time: 12/31/2020 11:27 AM  Performed by: Casey Aguila M.D.  Authorized by: Casey Aguila M.D.     Location:  OR  Urgency:  Elective  Difficult Airway: No    Indications for Airway Management:  Anesthesia      Spontaneous Ventilation: absent    Sedation Level:  Deep  Preoxygenated: Yes    Patient Position:  Sniffing  MILS Maintained Throughout: No    Mask Difficulty Assessment:  1 - vent by mask  Final Airway Type:  Supraglottic airway  Final Supraglottic Airway:  Standard LMA    SGA Size:  5  Number of Attempts at Approach:  1  Number of Other Approaches Attempted:  0

## 2021-01-01 NOTE — DISCHARGE INSTRUCTIONS
Discharge Instructions    Discharged to home by car with relative. Discharged via wheelchair, hospital escort: Yes.  Special equipment needed: Immobilizer    Be sure to schedule a follow-up appointment with your primary care doctor or any specialists as instructed.     Discharge Plan:   Diet Plan: Discussed  Activity Level: Discussed  Confirmed Follow up Appointment: Patient to Call and Schedule Appointment  Confirmed Symptoms Management: Discussed  Medication Reconciliation Updated: Yes    I understand that a diet low in cholesterol, fat, and sodium is recommended for good health. Unless I have been given specific instructions below for another diet, I accept this instruction as my diet prescription.   Other diet: Diet prior to arrival    Special Instructions: Discharge instructions for the Orthopedic Patient    Follow up with Primary Care Physician within 2 weeks of discharge to home, regarding:  Review of medications and diagnostic testing.  Surveillance for medical complications.  Workup and treatment of osteoporosis, if appropriate.     -Is this a Hip/Knee/Shoulder Joint Replacement patient? Yes   SHOULDER REPLACEMENT, AFTER-CARE GUIDELINES     These instructions provide you with information on caring for yourself and your shoulder after surgery. Your health care provider may also give you instructions that are more specific. Your treatment has been planned and performed according to current medical practices but problems sometimes occur. Call your health care provider if you have any problems or questions.     WHAT TO EXPECT AFTER THE PROCEDURE   After your procedure, your shoulder and arm will typically be stiff, sore, and bruised. This will improve over time.     HOME CARE INSTRUCTIONS   · Follow your home pain management plan as discussed with your nurse and as directed by your provider.   · It is important to follow any scheduled pain medications as directed for maximal pain relief.   · If prescribed  opioid medication, the goal is to use opioids ONLY IF NEEDED and to wean off prescription pain medicine as soon as possible.   · Apply ice to the injured area for several days after surgery:   · Put ice in a plastic bag.   · Place a towel between your skin and the bag.   · Leave the ice on for 20 minutes, 2-3 times a day at a minimum.   · You may resume your normal diet and activities as directed by your provider.   · Your arm will be in a sling. You will need to wear this for 4-6 weeks after surgery.   · It may be more comfortable to sleep in a recliner for several days or weeks after surgery.   · Do not use your arm to push yourself up in bed or from a chair.   · If prescribed a home exercise plan, follow the program of home exercises as suggested by your provider and/or occupational therapist. Do the exercises at least 3x daily as directed.   · Do not lift anything heavier than a cup of coffee for the first 6 weeks after surgery.   · Ask for help. If you do not have adequate assistance at home, please seek advice from your provider about home care or other services.   · Change dressings only if necessary and as directed. Keep wound dry and covered until otherwise directed by your provider.   · Make sure that you have a follow-up appointment with your provider after surgery.     SEEK URGENT MEDICAL CARE IF:   · You have redness, swelling, or increased pain at your operative site.   · You see pus coming from the wound.   · You have a fever greater than 100.5° F.   · You notice a bad smell coming from the wound or dressing.   · The edges of the wound break open after suture or staple removal.   · You have increasing pain with movement of the shoulder.   · You develop a rash.   · You have chest pain or shortness of breath.     This information is not intended to replace advice given to you by your health care provider. Please discuss any specific questions you have with your health care provider.       -Is this  patient being discharged with medication to prevent blood clots?  Yes, Aspirin Aspirin, ASA oral tablets  What is this medicine?  ASPIRIN (AS pir in) is a pain reliever. It is used to treat mild pain and fever. This medicine is also used as directed by a doctor to prevent and to treat heart attacks, to prevent strokes and blood clots, and to treat arthritis or inflammation.  This medicine may be used for other purposes; ask your health care provider or pharmacist if you have questions.  COMMON BRAND NAME(S): Aspir-Low, Aspir-Sandra, Aspirtab, Emmett Advanced Aspirin, Emmett Aspirin, Emmett Aspirin Extra Strength, Emmett Aspirin Plus, Emmett Extra Strength, Emmett Extra Strength Plus, Emmett Genuine Aspirin, Emmett Womens Aspirin, Bufferin, Bufferin Extra Strength, Bufferin Low Dose  What should I tell my health care provider before I take this medicine?  They need to know if you have any of these conditions:  · anemia  · asthma  · bleeding problems  · child with chickenpox, the flu, or other viral infection  · diabetes  · gout  · if you frequently drink alcohol containing drinks  · kidney disease  · liver disease  · low level of vitamin K  · lupus  · smoke tobacco  · stomach ulcers or other problems  · an unusual or allergic reaction to aspirin, tartrazine dye, other medicines, dyes, or preservatives  · pregnant or trying to get pregnant  · breast-feeding  How should I use this medicine?  Take this medicine by mouth with a glass of water. Follow the directions on the package or prescription label. You can take this medicine with or without food. If it upsets your stomach, take it with food. Do not take your medicine more often than directed.  Talk to your pediatrician regarding the use of this medicine in children. While this drug may be prescribed for children as young as 12 years of age for selected conditions, precautions do apply. Children and teenagers should not use this medicine to treat chicken pox or flu symptoms  unless directed by a doctor.  Patients over 65 years old may have a stronger reaction and need a smaller dose.  Overdosage: If you think you have taken too much of this medicine contact a poison control center or emergency room at once.  NOTE: This medicine is only for you. Do not share this medicine with others.  What if I miss a dose?  If you are taking this medicine on a regular schedule and miss a dose, take it as soon as you can. If it is almost time for your next dose, take only that dose. Do not take double or extra doses.  What may interact with this medicine?  Do not take this medicine with any of the following medications:  · cidofovir  · ketorolac  · probenecid  This medicine may also interact with the following medications:  · alcohol  · alendronate  · bismuth subsalicylate  · flavocoxid  · herbal supplements like feverfew, garlic, reina, ginkgo biloba, horse chestnut  · medicines for diabetes or glaucoma like acetazolamide, methazolamide  · medicines for gout  · medicines that treat or prevent blood clots like enoxaparin, heparin, ticlopidine, warfarin  · other aspirin and aspirin-like medicines  · NSAIDs, medicines for pain and inflammation, like ibuprofen or naproxen  · pemetrexed  · sulfinpyrazone  · varicella live vaccine  This list may not describe all possible interactions. Give your health care provider a list of all the medicines, herbs, non-prescription drugs, or dietary supplements you use. Also tell them if you smoke, drink alcohol, or use illegal drugs. Some items may interact with your medicine.  What should I watch for while using this medicine?  If you are treating yourself for pain, tell your doctor or health care professional if the pain lasts more than 10 days, if it gets worse, or if there is a new or different kind of pain. Tell your doctor if you see redness or swelling. Also, check with your doctor if you have a fever that lasts for more than 3 days. Only take this medicine to  prevent heart attacks or blood clotting if prescribed by your doctor or health care professional.  Do not take aspirin or aspirin-like medicines with this medicine. Too much aspirin can be dangerous. Always read the labels carefully.  This medicine can irritate your stomach or cause bleeding problems. Do not smoke cigarettes or drink alcohol while taking this medicine. Do not lie down for 30 minutes after taking this medicine to prevent irritation to your throat.  If you are scheduled for any medical or dental procedure, tell your healthcare provider that you are taking this medicine. You may need to stop taking this medicine before the procedure.  This medicine may be used to treat migraines. If you take migraine medicines for 10 or more days a month, your migraines may get worse. Keep a diary of headache days and medicine use. Contact your healthcare professional if your migraine attacks occur more frequently.  What side effects may I notice from receiving this medicine?  Side effects that you should report to your doctor or health care professional as soon as possible:  · allergic reactions like skin rash, itching or hives, swelling of the face, lips, or tongue  · breathing problems  · changes in hearing, ringing in the ears  · confusion  · general ill feeling or flu-like symptoms  · pain on swallowing  · redness, blistering, peeling or loosening of the skin, including inside the mouth or nose  · signs and symptoms of bleeding such as bloody or black, tarry stools; red or dark-brown urine; spitting up blood or brown material that looks like coffee grounds; red spots on the skin; unusual bruising or bleeding from the eye, gums, or nose  · trouble passing urine or change in the amount of urine  · unusually weak or tired  · yellowing of the eyes or skin  Side effects that usually do not require medical attention (report to your doctor or health care professional if they continue or are bothersome):  · diarrhea or  constipation  · headache  · nausea, vomiting  · stomach gas, heartburn  This list may not describe all possible side effects. Call your doctor for medical advice about side effects. You may report side effects to FDA at 2-101-OYX-8023.  Where should I keep my medicine?  Keep out of the reach of children.  Store at room temperature between 15 and 30 degrees C (59 and 86 degrees F). Protect from heat and moisture. Do not use this medicine if it has a strong vinegar smell. Throw away any unused medicine after the expiration date.  NOTE: This sheet is a summary. It may not cover all possible information. If you have questions about this medicine, talk to your doctor, pharmacist, or health care provider.  © 2020 [employer]/Gold Standard (2018-01-30 10:42:13)      · Is patient discharged on Warfarin / Coumadin?   No     Depression / Suicide Risk    As you are discharged from this [facility] facility, it is important to learn how to keep safe from harming yourself.    Recognize the warning signs:  · Abrupt changes in personality, positive or negative- including increase in energy   · Giving away possessions  · Change in eating patterns- significant weight changes-  positive or negative  · Change in sleeping patterns- unable to sleep or sleeping all the time   · Unwillingness or inability to communicate  · Depression  · Unusual sadness, discouragement and loneliness  · Talk of wanting to die  · Neglect of personal appearance   · Rebelliousness- reckless behavior  · Withdrawal from people/activities they love  · Confusion- inability to concentrate     If you or a loved one observes any of these behaviors or has concerns about self-harm, here's what you can do:  · Talk about it- your feelings and reasons for harming yourself  · Remove any means that you might use to hurt yourself (examples: pills, rope, extension cords, firearm)  · Get professional help from the community (Mental Health, Substance Abuse, psychological  counseling)  · Do not be alone:Call your Safe Contact- someone whom you trust who will be there for you.  · Call your local CRISIS HOTLINE 597-2462 or 557-781-3202  · Call your local Children's Mobile Crisis Response Team [place] (073) 235-6637 or www.[employer]  · Call the toll free National Suicide Prevention Hotlines   · National Suicide Prevention Lifeline 830-757-RBXB (6378)  · National Hope Line Network 800-SUICIDE (851-5743)

## 2021-01-01 NOTE — THERAPY
"Occupational Therapy   Initial Evaluation     Patient Name: Mazin Wing  Age:  65 y.o., Sex:  male  Medical Record #: [id number]  Today's Date: 12/31/2020     Precautions  Precautions: Immobilizer Left Upper Extremity    Assessment  Patient is 65 y.o. male seen for OT eval following total shoulder arthroplasty. Pt has a very supportive spouse who was present for all education. Pt and spouse educated on donning doffing immobilizer, compensatory strategies for UB dressing and bathing, AROM for left elbow, wrist and hand, pendulum exercises for left shoulder, immobilizer wear schedule and no AROM to left shoulder. Pt and spouse verbalized understanding. Spouse return demonstrated donning the immobilizer. Pt with no further skilled OT needs in the acute care setting at this time.       Plan    Recommend Occupational Therapy for Evaluation only     DC Equipment Recommendations: None        Subjective    \"I'm ready to go.\"     Objective       12/31/20 1713   Prior Living Situation   Prior Services None   Housing / Facility 1 Story House   Lives with - Patient's Self Care Capacity Spouse   Comments spouse at bedside and very supportive    Prior Level of ADL Function   Self Feeding Independent   Grooming / Hygiene Independent   Bathing Independent   Dressing Independent   Toileting Independent   Prior Level of IADL Function   Medication Management Independent   Laundry Independent   Kitchen Mobility Independent   Finances Independent   Home Management Independent   Shopping Independent   Prior Level Of Mobility Independent Without Device in Community   Driving / Transportation Driving Independent   Precautions   Precautions Immobilizer Left Upper Extremity   Pain 0 - 10 Group   Therapist Pain Assessment During Activity;0;Nurse Notified   Cognition    Cognition / Consciousness WDL   Comments hard of hearing    Active ROM Upper Body   Dominant Hand Right   Comments RUE WNL, left UE WNL for wrist and hand, still not able " to move left elbow following nerve block. No AROM permitted for left shoulder following surgery    Strength Upper Body   Comments RUE WNL, LUE NT    Sensation Upper Body   Comments pt reports numbness in left UE following nerve block    Balance Assessment   Sitting Balance (Static) Good   Sitting Balance (Dynamic) Good   Standing Balance (Static) Good   Standing Balance (Dynamic) Good   Weight Shift Sitting Good   Weight Shift Standing Good   Comments no AD    Bed Mobility    Supine to Sit Supervised   Sit to Supine Supervised   Scooting Supervised   Comments pt practiced bed mobility from a flat bed    ADL Assessment   Upper Body Dressing Moderate Assist   Lower Body Dressing Minimal Assist   Toileting Supervision  (stood to void )   Comments spouse and pt educated on donning and doffing shoulder immobilizer, UB dressing and safety with showering   Functional Mobility   Sit to Stand Supervised   Bed, Chair, Wheelchair Transfer Supervised   Mobility walked without AD, no LOB noted    Education Group   Education Provided Upper Extremity Range of Motion;Splints Wear and Care;Role of Occupational Therapist;Activities of Daily Living   Additional Comments educated pt and spouse on AROM to left hand, wrist and elbow, and pendulum exercises for LUE. No AROM to left shoulder, Handouts provided and all exercises demonstrated for pt and spouse and all questions answered

## 2021-01-01 NOTE — ANESTHESIA POSTPROCEDURE EVALUATION
Patient: Mazin Wing    Procedure Summary     Date: 12/31/20 Room / Location:  OR 02 / SURGERY [facility]    Anesthesia Start: 1119 Anesthesia Stop: 1333    Procedures:       ARTHROPLASTY, SHOULDER, TOTAL (Left Shoulder)      TENODESIS, BICEPS (Left Shoulder) Diagnosis: (LOCALIZED PRIMARY OSTEOARTHRITIS OF THE SHOULDER REGION)    Surgeons: Ric Cooper M.D. Responsible Provider: Casey Aguila M.D.    Anesthesia Type: general, peripheral nerve block ASA Status: 2          Final Anesthesia Type: general, peripheral nerve block  Last vitals  BP   Blood Pressure : 139/82    Temp   36.3 °C (97.3 °F)    Pulse   Pulse: 68   Resp   18    SpO2   95 %      Anesthesia Post Evaluation    Patient location during evaluation: PACU  Patient participation: complete - patient participated  Level of consciousness: awake and alert  Pain score: 0    Airway patency: patent  Anesthetic complications: no  Cardiovascular status: hemodynamically stable  Respiratory status: acceptable  Hydration status: euvolemic    PONV: none    patient able to participate, but full recovery from regional anesthesia has not occurred and is not expected within the stipulated timeframe for the completion of the evaluation       Nurse Pain Score: 0 (NPRS)

## 2021-01-25 RX ORDER — VARENICLINE TARTRATE 1 MG/1
TABLET, FILM COATED ORAL
Qty: 56 TAB | Refills: 3 | Status: SHIPPED | OUTPATIENT
Start: 2021-01-25 | End: 2021-05-17 | Stop reason: SDUPTHER

## 2021-01-25 NOTE — TELEPHONE ENCOUNTER
Received request via: Pharmacy    Was the patient seen in the last year in this department? Yes    Does the patient have an active prescription (recently filled or refills available) for medication(s) requested? No      Phone Number Called: [phone]     Call outcome: Spoke to patient regarding message below.    Message: Called to speak to pt about message below     Spoke with pt regarding Dr. Vaca no longer a provider at [facility]. Pt stated that he has an appt with Dr. Vaca at her new location 3/2/21.

## 2021-03-08 ENCOUNTER — HOSPITAL ENCOUNTER (OUTPATIENT)
Dept: LAB | Facility: MEDICAL CENTER | Age: 66
End: 2021-03-08
Attending: FAMILY MEDICINE
Payer: COMMERCIAL

## 2021-03-08 DIAGNOSIS — E78.2 MIXED HYPERLIPIDEMIA: ICD-10-CM

## 2021-03-08 DIAGNOSIS — R73.03 PREDIABETES: ICD-10-CM

## 2021-03-08 PROCEDURE — 80061 LIPID PANEL: CPT

## 2021-03-08 PROCEDURE — 80053 COMPREHEN METABOLIC PANEL: CPT

## 2021-03-08 PROCEDURE — 83036 HEMOGLOBIN GLYCOSYLATED A1C: CPT

## 2021-03-08 PROCEDURE — 36415 COLL VENOUS BLD VENIPUNCTURE: CPT

## 2021-03-09 LAB
ALBUMIN SERPL BCP-MCNC: 3.9 G/DL (ref 3.2–4.9)
ALBUMIN/GLOB SERPL: 1.4 G/DL
ALP SERPL-CCNC: 95 U/L (ref 30–99)
ALT SERPL-CCNC: 51 U/L (ref 2–50)
ANION GAP SERPL CALC-SCNC: 6 MMOL/L (ref 7–16)
AST SERPL-CCNC: 34 U/L (ref 12–45)
BILIRUB SERPL-MCNC: 0.3 MG/DL (ref 0.1–1.5)
BUN SERPL-MCNC: 14 MG/DL (ref 8–22)
CALCIUM SERPL-MCNC: 9.4 MG/DL (ref 8.5–10.5)
CHLORIDE SERPL-SCNC: 104 MMOL/L (ref 96–112)
CHOLEST SERPL-MCNC: 124 MG/DL (ref 100–199)
CO2 SERPL-SCNC: 28 MMOL/L (ref 20–33)
CREAT SERPL-MCNC: 0.72 MG/DL (ref 0.5–1.4)
EST. AVERAGE GLUCOSE BLD GHB EST-MCNC: 114 MG/DL
FASTING STATUS PATIENT QL REPORTED: NORMAL
GLOBULIN SER CALC-MCNC: 2.7 G/DL (ref 1.9–3.5)
GLUCOSE SERPL-MCNC: 112 MG/DL (ref 65–99)
HBA1C MFR BLD: 5.6 % (ref 4–5.6)
HDLC SERPL-MCNC: 36 MG/DL
LDLC SERPL CALC-MCNC: 69 MG/DL
POTASSIUM SERPL-SCNC: 4.8 MMOL/L (ref 3.6–5.5)
PROT SERPL-MCNC: 6.6 G/DL (ref 6–8.2)
SODIUM SERPL-SCNC: 138 MMOL/L (ref 135–145)
TRIGL SERPL-MCNC: 97 MG/DL (ref 0–149)

## 2021-03-24 NOTE — ANESTHESIA QCDR
2019 [facility] Clinical Data Registry (for Quality Improvement)     Postoperative nausea/vomiting risk protocol (Adult = 18 yrs and Pediatric 3-17 yrs)- (430 and 463)  General inhalation anesthetic (NOT TIVA) with PONV risk factors: No  Provision of anti-emetic therapy with at least 2 different classes of agents: N/A  Patient DID NOT receive anti-emetic therapy and reason is documented in Medical Record: N/A    Multimodal Pain Management- (477)  Non-emergent surgery AND patient age >= 18: Yes  Use of Multimodal Pain Management, two or more drugs and/or interventions, NOT including systemic opioids: Yes  Exception: Documented allergy to multiple classes of analgesics: N/A    Smoking Abstinence (404)  Patient is current smoker (cigarette, pipe, e-cig, marijuanna): No  Elective Surgery:   Abstinence instructions provided prior to day of surgery:   Patient abstained from smoking on day of surgery:     Pre-Op Beta-Blocker in Isolated CABG (44)  Isolated CABG AND patient age >= 18: No  Beta-blocker admin within 24 hours of surgical incision:   Exception:of medical reason(s) for not administering beta blocker within 24 hours prior to surgical incision (e.g., not  indicated,other medical reason):     PACU assessment of acute postoperative pain prior to Anesthesia Care End- Applies to Patients Age = 18- (ABG7)  Initial PACU pain score is which of the following: < 7/10  Patient unable to report pain score: N/A    Post-anesthetic transfer of care checklist/protocol to PACU/ICU- (426 and 427)  Upon conclusion of case, patient transferred to which of the following locations: PACU/Non-ICU  Use of transfer checklist/protocol: Yes  Exclusion: Service Performed in Patient Hospital Room (and thus did not require transfer): N/A  Unplanned admission to ICU related to anesthesia service up through end of PACU care- (MD51)  Unplanned admission to ICU (not initially anticipated at anesthesia start time): No          PAIN SCALE 8 OF 10.

## 2021-03-25 ENCOUNTER — OFFICE VISIT (OUTPATIENT)
Dept: URGENT CARE | Facility: PHYSICIAN GROUP | Age: 66
End: 2021-03-25
Payer: COMMERCIAL

## 2021-03-25 VITALS
TEMPERATURE: 99.2 F | HEART RATE: 64 BPM | OXYGEN SATURATION: 96 % | BODY MASS INDEX: 34.96 KG/M2 | HEIGHT: 69 IN | RESPIRATION RATE: 16 BRPM | SYSTOLIC BLOOD PRESSURE: 146 MMHG | DIASTOLIC BLOOD PRESSURE: 88 MMHG | WEIGHT: 236 LBS

## 2021-03-25 DIAGNOSIS — M79.10 MYALGIA: ICD-10-CM

## 2021-03-25 PROCEDURE — 99203 OFFICE O/P NEW LOW 30 MIN: CPT | Performed by: PHYSICIAN ASSISTANT

## 2021-03-25 RX ORDER — HYDROCODONE BITARTRATE AND ACETAMINOPHEN 7.5; 325 MG/1; MG/1
TABLET ORAL
COMMUNITY
End: 2021-06-24

## 2021-03-25 RX ORDER — ONDANSETRON 4 MG/1
TABLET, ORALLY DISINTEGRATING ORAL
COMMUNITY
End: 2021-05-17

## 2021-03-25 RX ORDER — TRAMADOL HYDROCHLORIDE 50 MG/1
TABLET ORAL
COMMUNITY
End: 2021-06-24

## 2021-03-25 RX ORDER — IBUPROFEN 800 MG/1
800 TABLET ORAL EVERY 8 HOURS PRN
Qty: 30 TABLET | Refills: 0 | Status: SHIPPED | OUTPATIENT
Start: 2021-03-25 | End: 2022-05-24

## 2021-03-25 RX ORDER — MELOXICAM 15 MG/1
TABLET ORAL
COMMUNITY
End: 2021-06-24

## 2021-03-25 ASSESSMENT — FIBROSIS 4 INDEX: FIB4 SCORE: 2.76

## 2021-03-25 NOTE — LETTER
March 25, 2021         Patient: Mazin Wing   YOB: 1955   Date of Visit: 3/25/2021           To Whom it May Concern:    Mazin Wing was seen in my clinic on 3/25/2021. He may return to work on 3/28/2021 without restrictions..    If you have any questions or concerns, please don't hesitate to call.        Sincerely,           Eliel Colon P.A.-C.  Electronically Signed

## 2021-05-17 ENCOUNTER — OFFICE VISIT (OUTPATIENT)
Dept: MEDICAL GROUP | Facility: MEDICAL CENTER | Age: 66
End: 2021-05-17
Payer: COMMERCIAL

## 2021-05-17 VITALS
WEIGHT: 225.09 LBS | DIASTOLIC BLOOD PRESSURE: 86 MMHG | HEART RATE: 65 BPM | SYSTOLIC BLOOD PRESSURE: 128 MMHG | OXYGEN SATURATION: 98 % | RESPIRATION RATE: 18 BRPM | BODY MASS INDEX: 33.34 KG/M2 | HEIGHT: 69 IN | TEMPERATURE: 97.6 F

## 2021-05-17 DIAGNOSIS — Z11.59 ENCOUNTER FOR HEPATITIS C SCREENING TEST FOR LOW RISK PATIENT: ICD-10-CM

## 2021-05-17 DIAGNOSIS — R91.1 LUNG NODULE: ICD-10-CM

## 2021-05-17 DIAGNOSIS — R56.9 SEIZURE (HCC): ICD-10-CM

## 2021-05-17 DIAGNOSIS — G43.001 MIGRAINE WITHOUT AURA AND WITH STATUS MIGRAINOSUS, NOT INTRACTABLE: ICD-10-CM

## 2021-05-17 DIAGNOSIS — H53.9 VISUAL DISTURBANCES: ICD-10-CM

## 2021-05-17 DIAGNOSIS — D69.6 THROMBOCYTOPENIA (HCC): ICD-10-CM

## 2021-05-17 DIAGNOSIS — Z87.891 HISTORY OF TOBACCO USE: ICD-10-CM

## 2021-05-17 DIAGNOSIS — R91.8 ABNORMAL CHEST X-RAY WITH MULTIPLE LUNG NODULES: ICD-10-CM

## 2021-05-17 DIAGNOSIS — Z23 NEED FOR VACCINATION: ICD-10-CM

## 2021-05-17 DIAGNOSIS — H91.93 BILATERAL HEARING LOSS, UNSPECIFIED HEARING LOSS TYPE: ICD-10-CM

## 2021-05-17 DIAGNOSIS — R41.3 MEMORY LOSS: ICD-10-CM

## 2021-05-17 DIAGNOSIS — I70.0 AORTIC ATHEROSCLEROSIS (HCC): ICD-10-CM

## 2021-05-17 DIAGNOSIS — R73.03 PREDIABETES: ICD-10-CM

## 2021-05-17 DIAGNOSIS — R25.2 CRAMPS OF LOWER EXTREMITY: ICD-10-CM

## 2021-05-17 PROBLEM — R07.81 RIB PAIN ON LEFT SIDE: Status: RESOLVED | Noted: 2018-02-16 | Resolved: 2021-05-17

## 2021-05-17 PROCEDURE — 99215 OFFICE O/P EST HI 40 MIN: CPT | Mod: 25 | Performed by: INTERNAL MEDICINE

## 2021-05-17 PROCEDURE — 99417 PROLNG OP E/M EACH 15 MIN: CPT | Performed by: INTERNAL MEDICINE

## 2021-05-17 PROCEDURE — 90471 IMMUNIZATION ADMIN: CPT | Performed by: INTERNAL MEDICINE

## 2021-05-17 PROCEDURE — 90715 TDAP VACCINE 7 YRS/> IM: CPT | Performed by: INTERNAL MEDICINE

## 2021-05-17 RX ORDER — VARENICLINE TARTRATE 1 MG/1
TABLET, FILM COATED ORAL
Qty: 56 TABLET | Refills: 3 | Status: SHIPPED | OUTPATIENT
Start: 2021-05-17 | End: 2022-03-14

## 2021-05-17 ASSESSMENT — FIBROSIS 4 INDEX: FIB4 SCORE: 2.76

## 2021-05-17 ASSESSMENT — PATIENT HEALTH QUESTIONNAIRE - PHQ9: CLINICAL INTERPRETATION OF PHQ2 SCORE: 0

## 2021-05-17 NOTE — PROGRESS NOTES
CC:  Diagnoses of Seizure (HCC), Visual disturbances, Migraine without aura and with status migrainosus, not intractable, Memory loss, Abnormal chest x-ray with multiple lung nodules, Lung nodule, Prediabetes, Encounter for hepatitis C screening test for low risk patient, Need for vaccination, Thrombocytopenia (HCC), Bilateral hearing loss, unspecified hearing loss type, Cramps of lower extremity, and History of tobacco use were pertinent to this visit.    HISTORY OF THE PRESENT ILLNESS: Patient is a 65 y.o. male. This pleasant patient is here today to establish care.    Patient has a very extensive medical history and scheduling did overbook his appointment today.    Regarding his seizure history he says he did see a neurologist in the past and remains compliant with Depakote and also note he is on gabapentin at night.  He says his seizures are a kaleidoscope in his vision and he has this once a week.  Also has history of migraines.  He also complains of chronic memory loss both short and long term which has been ongoing for a few years per patient, and it was difficult for patient to describe.  First he says he just does not remember things he does not care about then he says that his wife does feel that it is more of a problem and he would like to have this assessed.  Wife is not present during appointment today.  Brain MRI from 7/27/2015 shows mild supratentorial white matter disease most likely consistent with microvascular ischemic changes versus demyelination and no evidence of acute stroke/hemorrhage or mass that time.  Also long-term hearing loss he is agreeable to audiology referral.    With his smoking history he is on Chantix and cutting back.  He had quit 9/1/2020 and says he smoked more than 43 years, did resume smoking again due to stressors at home.  Says he was stressed because his wife was having surgery per my recollection.  He says stress is better now and he declines psychology referral or  specific treatment for stress management.  Has had CT lung cancer screening in the past this was reviewed from 10/10/2016  Atherosclerotic plaque in aorta, no lymphadenopathy, mild bronchiectasis, signs of right atelectasis medial right basilar area of mild degree, emphysematous changes, mild biapical scarring, 5 mm lung nodule right upper lobe and ill-defined opacity in the lingula 6.5 mm.  As a side note diverticulosis was seen in the colon and degenerative changes in the spine and shoulders.  Chest x-ray 11/6/2017 did confirm right mid lung nodule seen on prior CT with recommended follow-up as clinically indicated.  He denies any pulmonary symptoms at this time.    Again CT thorax did show incidental degenerative changes spine and shoulders he says he has intermittent joint pains and none currently.  For severe symptoms in the past he has been prescribed Ultram and Norco and he says he uses this very rarely and the medications were prescribed postoperatively left total shoulder from 2020 and he still has some at home.  Says he will eventually follow-up with his orthopedic specialist as he says he needs the right shoulder done as well.  Patient has also had prior neck surgery and knee replacements noted.    At the end of appointment he discussed he has cramps in his legs.  Does have long history of lower back issues could consider with his smoking history neurologic versus vascular claudication and he is agreeable to additional studies.    Allergies: Erythromycin, Latex, and Other environmental    Current Outpatient Medications Ordered in Epic   Medication Sig Dispense Refill   • varenicline (CHANTIX CONTINUING MONTH JERI) 1 MG tablet TAKE 1 TABLET BY MOUTH TWICE A DAY 56 tablet 3   • traMADol (ULTRAM) 50 MG Tab tramadol 50 mg tablet   TAKE 1 TO 2 ORAL TABLETS EVERY 8 HOURS AS NEEDED FOR MODERATE PAIN FOR 14 DAYS**L89.18     • meloxicam (MOBIC) 15 MG tablet meloxicam 15 mg tablet   TAKE ONE ORAL TABLET ONCE DAILY      • HYDROcodone-acetaminophen (NORCO) 7.5-325 MG per tablet hydrocodone 7.5 mg-acetaminophen 325 mg tablet   TAKE ONE ORAL TABLET EVERY 4 TO 6 HOURS AS NEEDED FOR SEVERE PAIN FOR 14 DAYS**L89.18     • ibuprofen (MOTRIN) 800 MG Tab Take 1 tablet by mouth every 8 hours as needed. 30 tablet 0   • atorvastatin (LIPITOR) 10 MG Tab Take 1 Tab by mouth every day. 100 Tab 3   • triamcinolone acetonide (KENALOG) 0.1 % Cream as needed.     • diclofenac CR (VOLTAREN-XR) 100 MG TABLET SR 24 HR tablet Take 1 Tab by mouth every day. 90 Tab 3   • gabapentin (NEURONTIN) 100 MG Cap Take 1 Cap by mouth every bedtime. 90 Cap 2   • BACTROBAN 2 % Ointment Apply 1 Application to affected area(s) 2 times a day. (Patient taking differently: Apply 1 Application topically as needed.) 60 g 3   • omeprazole (PRILOSEC) 20 MG delayed-release capsule Take 1 Cap by mouth every day. 90 Cap 3   • divalproex ER (DEPAKOTE ER) 500 MG TABLET SR 24 HR TAKE 1 TABLET BY MOUTH TWICE A  Tab 3   • tamsulosin (FLOMAX) 0.4 MG capsule Take 0.4 mg by mouth ONE-HALF HOUR AFTER BREAKFAST.     • Cholecalciferol ([employer] VITAMIN D) 2000 UNIT Cap Take 1 Cap by mouth every bedtime. For vitamin D deficiency 100 Cap 3     No current Epic-ordered facility-administered medications on file.       Past Medical History:   Diagnosis Date   • Abnormal LFTs (liver function tests) 9/14/2020   • Anesthesia     occasionally wakes up agitated/ anxious   • Arthritis    • Atypical pigmented skin lesion 7/30/2019   • Carpal tunnel syndrome     bilateral, uncontrolled   • Cholesterol blood decreased    • Contact dermatitis and other eczema, due to unspecified cause     Chronic, controlled with meds   • Edema extremities 8/22/2014   • Elevated PSA, less than 10 ng/ml 2/25/2019   • Epididymitis     chronic on left   • Esophageal reflux    • Heart burn    • Loss of height     Loss of 1.5 inches 2009,   • 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 11/6/2009    Quit 12/09, then restarted    •  Mixed hyperlipidemia    • Osteoarthrosis involving, or with mention of more than one site, but not specified as generalized, multiple sites    • Pain 9/3/14    left knee   • Prediabetes     uncontrolled   • Seborrheic keratoses, inflamed 2014   • Seizure ([facility])     seizure is visual changes only, none since medication started in    • Short-term memory loss 2020   • Snoring    • Thrombocytopenia ([facility]) 2020   • Tinnitus     chronic with hearing loss   • Unspecified vitamin D deficiency 3/8/2010   • Visual disturbances 2015       Past Surgical History:   Procedure Laterality Date   • PB RECONSTR TOTAL SHOULDER IMPLANT Left 2020    Procedure: ARTHROPLASTY, SHOULDER, TOTAL;  Surgeon: Ric Cooper M.D.;  Location: SURGERY [facility];  Service: Orthopedics   • BICEPS TENODESIS Left 2020    Procedure: TENODESIS, BICEPS;  Surgeon: Ric Cooper M.D.;  Location: SURGERY [facility];  Service: Orthopedics   • KNEE ARTHROPLASTY TOTAL  9/15/2014    Performed by Cesar Abreu M.D. at SURGERY [facility] ORS   • KNEE ARTHROPLASTY TOTAL  3/7/2011    right   • CARPAL TUNNEL ENDOSCOPIC  2009    Performed by RONNIE MAURICIO at SURGERY [facility] ORS   • CERVICAL DISK AND FUSION ANTERIOR  2009    Performed by AMINA NERI at SURGERY [facility] ORS   • TOENAIL REMOVAL      bilateral great toes   • ARTHROSCOPY, KNEE      right   • HEMORRHOIDECTOMY     • VASECTOMY         Social History     Tobacco Use   • Smoking status: Former Smoker     Packs/day: 1.00     Years: 43.00     Pack years: 43.00     Types: Cigarettes     Quit date: 2020     Years since quittin.7   • Smokeless tobacco: Never Used   • Tobacco comment: restarted now on chantix   Vaping Use   • Vaping Use: Never used   Substance Use Topics   • Alcohol use: Not Currently     Alcohol/week: 0.0 oz     Comment: rare   • Drug use: No       Social History     Social History  "Narrative    , 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[employer]       Family History   Problem Relation Age of Onset   • Cancer Mother    • Lung Cancer Mother    • Breast Cancer Daughter    • Lung Disease Other        Exam: /86 (BP Location: Left arm, Patient Position: Sitting, BP Cuff Size: Adult)   Pulse 65   Temp 36.4 °C (97.6 °F) (Temporal)   Resp 18   Ht 1.753 m (5' 9\")   Wt 102 kg (225 lb 1.4 oz)   SpO2 98%  Body mass index is 33.24 kg/m².    General: Normal appearing. No distress.  EYES: Conjunctiva clear lids without ptosis, pupils equal  EARS: Normal shape and contour   Pulmonary: Clear to ausculation.  Normal effort. No rales or wheezing.  Cardiovascular: Regular rate and rhythm   Abdomen: Soft, nontender, nondistended. Normal bowel sounds.  Neurologic: Cranial nerves grossly nonfocal  Skin: Warm and dry.  No obvious lesions.  Musculoskeletal: Normal gait. No extremity cyanosis, clubbing, or edema.  Psych: Normal mood and affect. Alert and oriented    Assessment/Plan  1. Seizure (HCC)  The description sounds more migrainous to me, patient adamant it is seizure causing kaleidoscope vision, referral to neurology.  He also cannot really tell me what medications he is on, referral to pharmacotherapy.  - REFERRAL TO PHARMACOTHERAPY SERVICE  - REFERRAL TO NEUROLOGY    2. Visual disturbances  See #1 above  - REFERRAL TO PHARMACOTHERAPY SERVICE    3. Migraine without aura and with status migrainosus, not intractable  1 above  - REFERRAL TO PHARMACOTHERAPY SERVICE    4. Memory loss  See #1 above in the setting of seizures, migraines, long-term smoking history and likely chronic ischemic changes on brain imaging, he may benefit from further memory testing.  - REFERRAL TO PHARMACOTHERAPY SERVICE  - TSH WITH REFLEX TO FT4; Future  - VITAMIN B12; Future  - RPR (SYPHILIS); Future  - CT-HEAD W/O; Future  - REFERRAL TO NEUROLOGY  - REFERRAL TO NEUROLOGY    5. Abnormal chest x-ray with multiple " lung nodules  Due for interval CT scan  - REFERRAL TO PHARMACOTHERAPY SERVICE    6. Lung nodule  Need for interval CT scan  - REFERRAL TO PHARMACOTHERAPY SERVICE  - CT-LUNG CANCER-SCREENING; Future    7. Prediabetes  Plan to continue current therapy for now A1c 5.6 and improved on labs 5/8/2021.    8. Encounter for hepatitis C screening test for low risk patient  Patient is agreeable to this preventive health guidelines.  - HEP C VIRUS ANTIBODY; Future    9. Need for vaccination  - Tdap Vaccine =>8YO IM    10. Thrombocytopenia (HCC)  Some labs for thrombocytopenia work-up can also help with the memory work-up.  Will reevaluate next visit.  Thrombocytopenia is chronic.  - TSH WITH REFLEX TO FT4; Future  - VITAMIN B12; Future  - FOLATE; Future    11. Bilateral hearing loss, unspecified hearing loss type  Since hearing loss can affect memory referral to audiology.  - REFERRAL TO NEUROLOGY  - REFERRAL TO AUDIOLOGY    12. Cramps of lower extremity  Chronic smoking rule out vascular claudication.  - US-EXTREMITY ARTERY LOWER BILAT W/BOB (COMBO); Future  - US-AORTA/ILIACS DUPLEX COMPLETE; Future    13. History of tobacco use  On Chantix will reassess  - US-EXTREMITY ARTERY LOWER BILAT W/BOB (COMBO); Future  - US-AORTA/ILIACS DUPLEX COMPLETE; Future        rtc 3m    More than 90 minutes spent on encounter today with review of chart, coordinating of care, face-to-face, etc.    Please note that this dictation was created using voice recognition software. I have made every reasonable attempt to correct obvious errors, but I expect that there are errors of grammar and possibly content that I did not discover before finalizing the note.

## 2021-05-18 ENCOUNTER — TELEPHONE (OUTPATIENT)
Dept: VASCULAR LAB | Facility: MEDICAL CENTER | Age: 66
End: 2021-05-18

## 2021-05-18 NOTE — TELEPHONE ENCOUNTER
Left voicemail message for patient to return call to [facility] to establish care      [facility]ARM[facility]  Bertha Bowers, Clinical Pharmacist, CDE, CACP

## 2021-05-25 ENCOUNTER — HOSPITAL ENCOUNTER (OUTPATIENT)
Dept: LAB | Facility: MEDICAL CENTER | Age: 66
End: 2021-05-25
Attending: UROLOGY
Payer: COMMERCIAL

## 2021-05-25 PROCEDURE — 36415 COLL VENOUS BLD VENIPUNCTURE: CPT

## 2021-05-25 PROCEDURE — 84153 ASSAY OF PSA TOTAL: CPT

## 2021-05-26 LAB — PSA SERPL-MCNC: 3.27 NG/ML (ref 0–4)

## 2021-06-05 DIAGNOSIS — L82.0 SEBORRHEIC KERATOSES, INFLAMED: ICD-10-CM

## 2021-06-07 RX ORDER — CLOBETASOL PROPIONATE 0.5 MG/G
OINTMENT TOPICAL
Qty: 60 G | Refills: 3 | Status: SHIPPED | OUTPATIENT
Start: 2021-06-07 | End: 2023-05-01

## 2021-06-09 ENCOUNTER — TELEPHONE (OUTPATIENT)
Dept: VASCULAR LAB | Facility: MEDICAL CENTER | Age: 66
End: 2021-06-09

## 2021-06-09 NOTE — TELEPHONE ENCOUNTER
[facility]    Received POLYPHARMACY referral from Dr. Carmen Soto on 5/17/21    Called and spoke with Layo (pt's spouse) regarding recent referral. She explained that pt could not recall which meds he was taking during PCP visit and has since created an index card that the pt takes with him with medications, dose, and indication.    Encouraged to schedule a visit to review medication, ADRs, and answer any possible questions. Layo declined offer stating that they have been assisting with pill box in addition to using index card. Will close referral at this time and advised them to contact PCP if any questions arise to create new referral and schedule appt.      [facility] at [phone], fax [phone]    Jalil Reaves, GwenD

## 2021-06-14 ENCOUNTER — HOSPITAL ENCOUNTER (OUTPATIENT)
Dept: CARDIOLOGY | Facility: MEDICAL CENTER | Age: 66
End: 2021-06-14
Attending: INTERNAL MEDICINE
Payer: COMMERCIAL

## 2021-06-14 DIAGNOSIS — R06.09 DOE (DYSPNEA ON EXERTION): ICD-10-CM

## 2021-06-14 DIAGNOSIS — I45.10 RBBB: ICD-10-CM

## 2021-06-14 LAB
LV EJECT FRACT  99904: 65
LV EJECT FRACT MOD 2C 99903: 71.44
LV EJECT FRACT MOD 4C 99902: 62.46
LV EJECT FRACT MOD BP 99901: 67.45

## 2021-06-14 PROCEDURE — 93306 TTE W/DOPPLER COMPLETE: CPT

## 2021-06-14 PROCEDURE — 93306 TTE W/DOPPLER COMPLETE: CPT | Mod: 26 | Performed by: INTERNAL MEDICINE

## 2021-06-15 ENCOUNTER — TELEPHONE (OUTPATIENT)
Dept: CARDIOLOGY | Facility: MEDICAL CENTER | Age: 66
End: 2021-06-15

## 2021-06-15 NOTE — TELEPHONE ENCOUNTER
DELMI Marquez R.N.  Echocardiogram looks good.  Please let him know.   ---------------------------------------------------------------------------------------------------    Called pt, s/w his wife, informed her of results.

## 2021-06-16 DIAGNOSIS — H53.9 VISUAL DISTURBANCES: ICD-10-CM

## 2021-06-16 DIAGNOSIS — G43.001 MIGRAINE WITHOUT AURA AND WITH STATUS MIGRAINOSUS, NOT INTRACTABLE: ICD-10-CM

## 2021-06-17 RX ORDER — DIVALPROEX SODIUM 500 MG/1
TABLET, EXTENDED RELEASE ORAL
Qty: 180 TABLET | Refills: 3 | Status: SHIPPED | OUTPATIENT
Start: 2021-06-17 | End: 2022-05-24

## 2021-06-24 ENCOUNTER — OFFICE VISIT (OUTPATIENT)
Dept: CARDIOLOGY | Facility: MEDICAL CENTER | Age: 66
End: 2021-06-24
Payer: COMMERCIAL

## 2021-06-24 VITALS
HEIGHT: 69 IN | OXYGEN SATURATION: 97 % | BODY MASS INDEX: 33.62 KG/M2 | HEART RATE: 70 BPM | WEIGHT: 227 LBS | SYSTOLIC BLOOD PRESSURE: 110 MMHG | DIASTOLIC BLOOD PRESSURE: 70 MMHG

## 2021-06-24 DIAGNOSIS — R73.03 PREDIABETES: ICD-10-CM

## 2021-06-24 DIAGNOSIS — R79.89 ABNORMAL LFTS (LIVER FUNCTION TESTS): ICD-10-CM

## 2021-06-24 DIAGNOSIS — D69.6 THROMBOCYTOPENIA (HCC): ICD-10-CM

## 2021-06-24 DIAGNOSIS — R06.09 DOE (DYSPNEA ON EXERTION): ICD-10-CM

## 2021-06-24 DIAGNOSIS — E78.2 MIXED HYPERLIPIDEMIA: ICD-10-CM

## 2021-06-24 DIAGNOSIS — R03.0 PREHYPERTENSION: ICD-10-CM

## 2021-06-24 DIAGNOSIS — I45.10 RBBB: ICD-10-CM

## 2021-06-24 DIAGNOSIS — F17.201 MILD TOBACCO ABUSE IN EARLY REMISSION: ICD-10-CM

## 2021-06-24 PROCEDURE — 99213 OFFICE O/P EST LOW 20 MIN: CPT | Performed by: INTERNAL MEDICINE

## 2021-06-24 ASSESSMENT — FIBROSIS 4 INDEX: FIB4 SCORE: 2.76

## 2021-06-24 NOTE — LETTER
[facility]   [address], [address]  [place] [address]  Phone: [phone]  Fax: [phone]              Mazin Wing  1955    Encounter Date: 6/24/2021    Riana Colbert M.D.          PROGRESS NOTE:  Subjective:   Chief Complaint:   Chief Complaint   Patient presents with   • Hyperlipidemia   • Hypertension       Mazin Wing is a 65 y.o. male who returns for borderline hypertension, hyperlipidemia, dyspnea on exertion, right bundle branch block.    Has mixed hyperlipidemia,, , HDL low.  We restarted atorvastatin 10 mg and he also changed his diet and lost weight.  LDL dropped to 69.    ECG with RBBB.    Has borderline hypertension, no meds.  He is on Flomax which can reduce blood pressure.    Has prediabetes, IFG in the past, no meds.    Smoking on/off at times, quit in 2019, trying to quit again.  Has been on Chantix.    Has thrombocytopenia, only very mild, 112K.    Prior abnormal LFTs, ALT up a bit, no sign etoh really to explain.  Most recent LFTs on statin look great.    Does have some mild DORADO, he thinks related to prior smoking, and weight gain.  Echo was unremarkable.    He will need spine surgery, using injections now.    He is not limited by chest pain, pressure or tightness with activity.   No significant dyspnea on exertion, orthopnea or lower extremity swelling.   No significant palpitations, lightheadedness, or presyncope/syncope.   No symptoms of leg claudication.   No stroke/TIA like symptoms.    No family history of premature coronary artery disease.  No history of autoimmune disease such as lupus or rheumatoid arthritis.  No chronic kidney disease.  No ETOH overuse.  No caffeine overuse.  No recreation substance use.    Here with his wife, Layo, she sees Jay Jacome, prior ablation  Works in [employer] service.    DATA REVIEWED by me:  ECG (my personal interpretation) 12/14/2020  Sinus, 58, RBBB    Echo  6/14/2021  Left ventricular ejection fraction is visually estimated to be 65%.  Mildly dilated left atrium.  Mild aortic insufficiency.  Estimated right ventricular systolic pressure is 33 mmHg.     No prior study is available for comparison.        Most recent labs:       Lab Results   Component Value Date/Time    WBC 7.3 12/14/2020 12:29 PM    HEMOGLOBIN 14.9 12/14/2020 12:29 PM    HEMATOCRIT 45.0 12/14/2020 12:29 PM    MCV 91.8 12/14/2020 12:29 PM      Lab Results   Component Value Date/Time    SODIUM 138 03/08/2021 07:24 AM    POTASSIUM 4.8 03/08/2021 07:24 AM    CHLORIDE 104 03/08/2021 07:24 AM    CO2 28 03/08/2021 07:24 AM    GLUCOSE 112 (H) 03/08/2021 07:24 AM    BUN 14 03/08/2021 07:24 AM    CREATININE 0.72 03/08/2021 07:24 AM      Lab Results   Component Value Date/Time    ASTSGOT 34 03/08/2021 07:24 AM    ALTSGPT 51 (H) 03/08/2021 07:24 AM    ALBUMIN 3.9 03/08/2021 07:24 AM      Lab Results   Component Value Date/Time    CHOLSTRLTOT 124 03/08/2021 07:24 AM    LDL 69 03/08/2021 07:24 AM    HDL 36 (A) 03/08/2021 07:24 AM    TRIGLYCERIDE 97 03/08/2021 07:24 AM     No results for input(s): NTPROBNP, TROPONINT in the last 72 hours.      Past Medical History:   Diagnosis Date   • Abnormal LFTs (liver function tests) 9/14/2020   • Anesthesia     occasionally wakes up agitated/ anxious   • Arthritis    • Atypical pigmented skin lesion 7/30/2019   • Carpal tunnel syndrome     bilateral, uncontrolled   • Cholesterol blood decreased    • Contact dermatitis and other eczema, due to unspecified cause     Chronic, controlled with meds   • Edema extremities 8/22/2014   • Elevated PSA, less than 10 ng/ml 2/25/2019   • Epididymitis     chronic on left   • Esophageal reflux    • Heart burn    • Loss of height     Loss of 1.5 inches 2009,   • Migraine    • 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 11/6/2009    Quit 12/09, then restarted    • Mixed hyperlipidemia    • Osteoarthrosis involving, or with mention of more than one  site, but not specified as generalized, multiple sites    • Pain 9/3/14    left knee   • Prediabetes     uncontrolled   • Seborrheic keratoses, inflamed 8/22/2014   • Seizure ([facility]) 2013    seizure is visual changes only, none since medication started in 2013   • Short-term memory loss 1/20/2020   • Snoring    • Thrombocytopenia ([facility]) 2/24/2020   • Tinnitus     chronic with hearing loss   • Unspecified vitamin D deficiency 3/8/2010   • Visual disturbances 1/30/2015     Past Surgical History:   Procedure Laterality Date   • PB RECONSTR TOTAL SHOULDER IMPLANT Left 12/31/2020    Procedure: ARTHROPLASTY, SHOULDER, TOTAL;  Surgeon: Ric Cooper M.D.;  Location: SURGERY [facility];  Service: Orthopedics   • BICEPS TENODESIS Left 12/31/2020    Procedure: TENODESIS, BICEPS;  Surgeon: Ric Cooper M.D.;  Location: [facility];  Service: Orthopedics   • KNEE ARTHROPLASTY TOTAL  9/15/2014    Performed by Cesar Abreu M.D. at [facility] ORS   • KNEE ARTHROPLASTY TOTAL  3/7/2011    right   • CARPAL TUNNEL ENDOSCOPIC  12/1/2009    Performed by RONNIE MAURICIO at SURGERY [facility] ORS   • CERVICAL DISK AND FUSION ANTERIOR  1/26/2009    Performed by AMINA NERI at SURGERY [facility] ORS   • TOENAIL REMOVAL  2008    bilateral great toes   • ARTHROSCOPY, KNEE  1989    right   • HEMORRHOIDECTOMY  1980   • VASECTOMY  1979     Family History   Problem Relation Age of Onset   • Cancer Mother    • Lung Cancer Mother    • Breast Cancer Daughter    • Lung Disease Other      Social History     Socioeconomic History   • Marital status:      Spouse name: Not on file   • Number of children: Not on file   • Years of education: Not on file   • Highest education level: Not on file   Occupational History   • Not on file   Tobacco Use   • Smoking status: Former Smoker     Packs/day: 1.00     Years: 43.00     Pack years: 43.00     Types: Cigarettes     Quit date: 9/1/2020     Years since  quittin.8   • Smokeless tobacco: Never Used   • Tobacco comment: restarted now on chantix   Vaping Use   • Vaping Use: Never used   Substance and Sexual Activity   • Alcohol use: Not Currently     Alcohol/week: 0.0 oz     Comment: rare   • Drug use: No   • Sexual activity: Yes     Partners: Female     Birth control/protection: Post-Menopausal     Comment: , 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[employer]   Other Topics Concern   •  Service Not Asked   • Blood Transfusions Not Asked   • Caffeine Concern Not Asked   • Occupational Exposure Not Asked   • Hobby Hazards Not Asked   • Sleep Concern Not Asked   • Stress Concern Not Asked   • Weight Concern Not Asked   • Special Diet Not Asked   • Back Care Not Asked   • Exercise No   • Bike Helmet Not Asked   • Seat Belt Not Asked   • Self-Exams Not Asked   Social History Narrative    , 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[employer]     Social Determinants of Health     Financial Resource Strain:    • Difficulty of Paying Living Expenses:    Food Insecurity:    • Worried About Running Out of Food in the Last Year:    • Ran Out of Food in the Last Year:    Transportation Needs:    • Lack of Transportation (Medical):    • Lack of Transportation (Non-Medical):    Physical Activity:    • Days of Exercise per Week:    • Minutes of Exercise per Session:    Stress:    • Feeling of Stress :    Social Connections:    • Frequency of Communication with Friends and Family:    • Frequency of Social Gatherings with Friends and Family:    • Attends Church Services:    • Active Member of Clubs or Organizations:    • Attends Club or Organization Meetings:    • Marital Status:    Intimate Partner Violence:    • Fear of Current or Ex-Partner:    • Emotionally Abused:    • Physically Abused:    • Sexually Abused:      Allergies   Allergen Reactions   • Erythromycin      Severe stomach pain   • Latex Rash     .   • Other Environmental      pollen       Current Outpatient  "Medications   Medication Sig Dispense Refill   • divalproex ER (DEPAKOTE ER) 500 MG TABLET SR 24 HR TAKE 1 TABLET BY MOUTH TWICE A  tablet 3   • clobetasol (TEMOVATE) 0.05 % Ointment APPLY TO AFFECTED AREA(S) 2 TIMES A DAY AS NEEDED. APPLY TO KNEES AS NEEDED 60 g 3   • varenicline (CHANTIX CONTINUING MONTH JERI) 1 MG tablet TAKE 1 TABLET BY MOUTH TWICE A DAY 56 tablet 3   • ibuprofen (MOTRIN) 800 MG Tab Take 1 tablet by mouth every 8 hours as needed. 30 tablet 0   • atorvastatin (LIPITOR) 10 MG Tab Take 1 Tab by mouth every day. 100 Tab 3   • gabapentin (NEURONTIN) 100 MG Cap Take 1 Cap by mouth every bedtime. 90 Cap 2   • BACTROBAN 2 % Ointment Apply 1 Application to affected area(s) 2 times a day. (Patient taking differently: Apply 1 Application topically as needed.) 60 g 3   • omeprazole (PRILOSEC) 20 MG delayed-release capsule Take 1 Cap by mouth every day. 90 Cap 3   • Cholecalciferol ([employer]S VITAMIN D) 2000 UNIT Cap Take 1 Cap by mouth every bedtime. For vitamin D deficiency 100 Cap 3   • tamsulosin (FLOMAX) 0.4 MG capsule Take 0.4 mg by mouth ONE-HALF HOUR AFTER BREAKFAST.       No current facility-administered medications for this visit.       ROS    All others systems reviewed and negative.     Objective:     /70 (BP Location: Left arm, Patient Position: Sitting, BP Cuff Size: Adult)   Pulse 70   Ht 1.753 m (5' 9\")   Wt 103 kg (227 lb)   SpO2 97%  Body mass index is 33.52 kg/m².    General: No acute distress. Well nourished.  HEENT: EOM grossly intact, no scleral icterus, no pharyngeal erythema.   Neck:  No JVD, no bruits, trachea midline  CVS: RRR. Normal S1, S2. No M/R/G. No LE edema.  2+ radial pulses, 2+ PT pulses  Resp: CTAB. No wheezing or crackles/rhonchi. Normal respiratory effort.  Abdomen: Soft, NT, no rosalia hepatomegaly.  MSK/Ext: No clubbing or cyanosis.  Skin: Warm and dry, no rashes.  Neurological: CN III-XII grossly intact. No focal deficits.   Psych: A&O x 3, appropriate affect, " good judgement    Physical exam performed today and unchanged, except what is noted, compared to 12-      Assessment:     1. DORADO (dyspnea on exertion)     2. Mixed hyperlipidemia     3. RBBB     4. Prediabetes     5. 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 Thrombocytopenia (HCC)     7. Abnormal LFTs (liver function tests)     8. Prehypertension         Medical Decision Making:  Today's Assessment / Status / Plan:     -He has mixed HLP, he is definitely a statin responder but his lifestyle changes really helped. LDL looks great.  This is primary prevention statin.  -Blood pressure looks good today, again, lifestyle did this.  -RBBB is not a concern  -Dyspnea on exertion could be related to pulmonary, his echo looked very good  -When he has spine surgery, he will not need clearance from a cardiologist  -I would be happy to order annual blood work or Dr. Soto can do this  -RTC 1 year, they would like to see the cardiologist for primary prevention startegy      Return in about 1 year (around 6/24/2022).    It is my pleasure to participate in the care of Mr. Wing.  Please do not hesitate to contact me with questions or concerns.    Riana Colbert MD, [facility]  Cardiologist [facility]    Please note that this dictation was created using voice recognition software. I have made every reasonable attempt to correct obvious errors, but it is possible there are errors of grammar and possibly content that I did not discover before finalizing the note.        Carmen Soto M.D.  [id number]  [address]  [place] [id number]  Via In Basket

## 2021-06-24 NOTE — PROGRESS NOTES
Subjective:   Chief Complaint:   Chief Complaint   Patient presents with   • Hyperlipidemia   • Hypertension       Mazin Wing is a 65 y.o. male who returns for borderline hypertension, hyperlipidemia, dyspnea on exertion, right bundle branch block.    Has mixed hyperlipidemia,, , HDL low.  We restarted atorvastatin 10 mg and he also changed his diet and lost weight.  LDL dropped to 69.    ECG with RBBB.    Has borderline hypertension, no meds.  He is on Flomax which can reduce blood pressure.    Has prediabetes, IFG in the past, no meds.    Smoking on/off at times, quit in 2019, trying to quit again.  Has been on Chantix.    Has thrombocytopenia, only very mild, 112K.    Prior abnormal LFTs, ALT up a bit, no sign etoh really to explain.  Most recent LFTs on statin look great.    Does have some mild DORADO, he thinks related to prior smoking, and weight gain.  Echo was unremarkable.    He will need spine surgery, using injections now.    Was told probably mild seizures, on meds, vision changes like kaleidescope.  Some memory problems, will be seeing neurology.    He is not limited by chest pain, pressure or tightness with activity.   No significant dyspnea on exertion, orthopnea or lower extremity swelling.   No significant palpitations, lightheadedness, or presyncope/syncope.   No symptoms of leg claudication.   No stroke/TIA like symptoms.    No family history of premature coronary artery disease.  No history of autoimmune disease such as lupus or rheumatoid arthritis.  No chronic kidney disease.  No ETOH overuse.  No caffeine overuse.  No recreation substance use.    Here with his wife, Layo, she sees Dr. Devin Evans, Jay Contreras, prior ablation  Works in [employer].    DATA REVIEWED by me:  ECG (my personal interpretation) 12/14/2020  Sinus, 58, RBBB    Echo 6/14/2021  Left ventricular ejection fraction is visually estimated to be 65%.  Mildly dilated left atrium.  Mild aortic  insufficiency.  Estimated right ventricular systolic pressure is 33 mmHg.     No prior study is available for comparison.        Most recent labs:       Lab Results   Component Value Date/Time    WBC 7.3 12/14/2020 12:29 PM    HEMOGLOBIN 14.9 12/14/2020 12:29 PM    HEMATOCRIT 45.0 12/14/2020 12:29 PM    MCV 91.8 12/14/2020 12:29 PM      Lab Results   Component Value Date/Time    SODIUM 138 03/08/2021 07:24 AM    POTASSIUM 4.8 03/08/2021 07:24 AM    CHLORIDE 104 03/08/2021 07:24 AM    CO2 28 03/08/2021 07:24 AM    GLUCOSE 112 (H) 03/08/2021 07:24 AM    BUN 14 03/08/2021 07:24 AM    CREATININE 0.72 03/08/2021 07:24 AM      Lab Results   Component Value Date/Time    ASTSGOT 34 03/08/2021 07:24 AM    ALTSGPT 51 (H) 03/08/2021 07:24 AM    ALBUMIN 3.9 03/08/2021 07:24 AM      Lab Results   Component Value Date/Time    CHOLSTRLTOT 124 03/08/2021 07:24 AM    LDL 69 03/08/2021 07:24 AM    HDL 36 (A) 03/08/2021 07:24 AM    TRIGLYCERIDE 97 03/08/2021 07:24 AM     No results for input(s): NTPROBNP, TROPONINT in the last 72 hours.      Past Medical History:   Diagnosis Date   • Abnormal LFTs (liver function tests) 9/14/2020   • Anesthesia     occasionally wakes up agitated/ anxious   • Arthritis    • Atypical pigmented skin lesion 7/30/2019   • Carpal tunnel syndrome     bilateral, uncontrolled   • Cholesterol blood decreased    • Contact dermatitis and other eczema, due to unspecified cause     Chronic, controlled with meds   • Edema extremities 8/22/2014   • Elevated PSA, less than 10 ng/ml 2/25/2019   • Epididymitis     chronic on left   • Esophageal reflux    • Heart burn    • Loss of height     Loss of 1.5 inches 2009,   • Migraine    • 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 11/6/2009    Quit 12/09, then restarted    • Mixed hyperlipidemia    • Osteoarthrosis involving, or with mention of more than one site, but not specified as generalized, multiple sites    • Pain 9/3/14    left knee   • Prediabetes     uncontrolled   •  Seborrheic keratoses, inflamed 2014   • Seizure ([facility]) 2013    seizure is visual changes only, none since medication started in    • Short-term memory loss 2020   • Snoring    • Thrombocytopenia (HCC) 2020   • Tinnitus     chronic with hearing loss   • Unspecified vitamin D deficiency 3/8/2010   • Visual disturbances 2015     Past Surgical History:   Procedure Laterality Date   • PB RECONSTR TOTAL SHOULDER IMPLANT Left 2020    Procedure: ARTHROPLASTY, SHOULDER, TOTAL;  Surgeon: Ric Cooper M.D.;  Location: SURGERY [facility];  Service: Orthopedics   • BICEPS TENODESIS Left 2020    Procedure: TENODESIS, BICEPS;  Surgeon: Ric Cooper M.D.;  Location: [facility];  Service: Orthopedics   • KNEE ARTHROPLASTY TOTAL  9/15/2014    Performed by Cesar Abreu M.D. at [facility] ORS   • KNEE ARTHROPLASTY TOTAL  3/7/2011    right   • CARPAL TUNNEL ENDOSCOPIC  2009    Performed by RONNIE MAURICIO at [facility] ORS   • CERVICAL DISK AND FUSION ANTERIOR  2009    Performed by AMINA NERI at SURGERY [facility] ORS   • TOENAIL REMOVAL      bilateral great toes   • ARTHROSCOPY, KNEE      right   • HEMORRHOIDECTOMY     • VASECTOMY       Family History   Problem Relation Age of Onset   • Cancer Mother    • Lung Cancer Mother    • Breast Cancer Daughter    • Lung Disease Other      Social History     Socioeconomic History   • Marital status:      Spouse name: Not on file   • Number of children: Not on file   • Years of education: Not on file   • Highest education level: Not on file   Occupational History   • Not on file   Tobacco Use   • Smoking status: Former Smoker     Packs/day: 1.00     Years: 43.00     Pack years: 43.00     Types: Cigarettes     Quit date: 2020     Years since quittin.8   • Smokeless tobacco: Never Used   • Tobacco comment: restarted now on chantix   Vaping Use   • Vaping Use:  Never used   Substance and Sexual Activity   • Alcohol use: Not Currently     Alcohol/week: 0.0 oz     Comment: rare   • Drug use: No   • Sexual activity: Yes     Partners: Female     Birth control/protection: Post-Menopausal     Comment: , 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[employer]   Other Topics Concern   •  Service Not Asked   • Blood Transfusions Not Asked   • Caffeine Concern Not Asked   • Occupational Exposure Not Asked   • Hobby Hazards Not Asked   • Sleep Concern Not Asked   • Stress Concern Not Asked   • Weight Concern Not Asked   • Special Diet Not Asked   • Back Care Not Asked   • Exercise No   • Bike Helmet Not Asked   • Seat Belt Not Asked   • Self-Exams Not Asked   Social History Narrative    , 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[employer]     Social Determinants of Health     Financial Resource Strain:    • Difficulty of Paying Living Expenses:    Food Insecurity:    • Worried About Running Out of Food in the Last Year:    • Ran Out of Food in the Last Year:    Transportation Needs:    • Lack of Transportation (Medical):    • Lack of Transportation (Non-Medical):    Physical Activity:    • Days of Exercise per Week:    • Minutes of Exercise per Session:    Stress:    • Feeling of Stress :    Social Connections:    • Frequency of Communication with Friends and Family:    • Frequency of Social Gatherings with Friends and Family:    • Attends Je[employer] witness Services:    • Active Member of Clubs or Organizations:    • Attends Club or Organization Meetings:    • Marital Status:    Intimate Partner Violence:    • Fear of Current or Ex-Partner:    • Emotionally Abused:    • Physically Abused:    • Sexually Abused:      Allergies   Allergen Reactions   • Erythromycin      Severe stomach pain   • Latex Rash     .   • Other Environmental      pollen       Current Outpatient Medications   Medication Sig Dispense Refill   • divalproex ER (DEPAKOTE ER) 500 MG TABLET SR 24 HR TAKE 1 TABLET BY MOUTH TWICE A  " tablet 3   • clobetasol (TEMOVATE) 0.05 % Ointment APPLY TO AFFECTED AREA(S) 2 TIMES A DAY AS NEEDED. APPLY TO KNEES AS NEEDED 60 g 3   • varenicline (CHANTIX CONTINUING MONTH JERI) 1 MG tablet TAKE 1 TABLET BY MOUTH TWICE A DAY 56 tablet 3   • ibuprofen (MOTRIN) 800 MG Tab Take 1 tablet by mouth every 8 hours as needed. 30 tablet 0   • atorvastatin (LIPITOR) 10 MG Tab Take 1 Tab by mouth every day. 100 Tab 3   • gabapentin (NEURONTIN) 100 MG Cap Take 1 Cap by mouth every bedtime. 90 Cap 2   • BACTROBAN 2 % Ointment Apply 1 Application to affected area(s) 2 times a day. (Patient taking differently: Apply 1 Application topically as needed.) 60 g 3   • omeprazole (PRILOSEC) 20 MG delayed-release capsule Take 1 Cap by mouth every day. 90 Cap 3   • Cholecalciferol (CVS VITAMIN D) 2000 UNIT Cap Take 1 Cap by mouth every bedtime. For vitamin D deficiency 100 Cap 3   • tamsulosin (FLOMAX) 0.4 MG capsule Take 0.4 mg by mouth ONE-HALF HOUR AFTER BREAKFAST.       No current facility-administered medications for this visit.       ROS    All others systems reviewed and negative.     Objective:     /70 (BP Location: Left arm, Patient Position: Sitting, BP Cuff Size: Adult)   Pulse 70   Ht 1.753 m (5' 9\")   Wt 103 kg (227 lb)   SpO2 97%  Body mass index is 33.52 kg/m².    General: No acute distress. Well nourished.  HEENT: EOM grossly intact, no scleral icterus, no pharyngeal erythema.   Neck:  No JVD, no bruits, trachea midline  CVS: RRR. Normal S1, S2. No M/R/G. No LE edema.  2+ radial pulses, 2+ PT pulses  Resp: CTAB. No wheezing or crackles/rhonchi. Normal respiratory effort.  Abdomen: Soft, NT, no rosalia hepatomegaly.  MSK/Ext: No clubbing or cyanosis.  Skin: Warm and dry, no rashes.  Neurological: CN III-XII grossly intact. No focal deficits.   Psych: A&O x 3, appropriate affect, good judgement    Physical exam performed today and unchanged, except what is noted, compared to 12-      Assessment: "     1. DORADO (dyspnea on exertion)     2. Mixed hyperlipidemia     3. RBBB     4. Prediabetes     5. 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 Thrombocytopenia (HCC)     7. Abnormal LFTs (liver function tests)     8. Prehypertension         Medical Decision Making:  Today's Assessment / Status / Plan:     -He has mixed HLP, he is definitely a statin responder but his lifestyle changes really helped. LDL looks great.  This is primary prevention statin.  -Blood pressure looks good today, again, lifestyle did this.  -RBBB is not a concern  -Dyspnea on exertion could be related to pulmonary, his echo looked very good  -When he has spine surgery, he will not need clearance from a cardiologist  -I would be happy to order annual blood work or Dr. Soto can do this  -RTC 1 year, they would like to see the cardiologist for primary prevention startegy      Return in about 1 year (around 6/24/2022).    It is my pleasure to participate in the care of Mr. Wing.  Please do not hesitate to contact me with questions or concerns.    Riana Colbert MD, [facility]  Cardiologist [facility]    Please note that this dictation was created using voice recognition software. I have made every reasonable attempt to correct obvious errors, but it is possible there are errors of grammar and possibly content that I did not discover before finalizing the note.

## 2021-08-16 ENCOUNTER — APPOINTMENT (OUTPATIENT)
Dept: MEDICAL GROUP | Facility: MEDICAL CENTER | Age: 66
End: 2021-08-16
Payer: COMMERCIAL

## 2021-08-23 RX ORDER — GABAPENTIN 100 MG/1
CAPSULE ORAL
Qty: 90 CAPSULE | Refills: 2 | Status: SHIPPED | OUTPATIENT
Start: 2021-08-23 | End: 2022-05-24

## 2021-09-13 ENCOUNTER — APPOINTMENT (OUTPATIENT)
Dept: RADIOLOGY | Facility: MEDICAL CENTER | Age: 66
End: 2021-09-13
Attending: PSYCHIATRY & NEUROLOGY
Payer: COMMERCIAL

## 2021-09-27 ENCOUNTER — APPOINTMENT (OUTPATIENT)
Dept: RADIOLOGY | Facility: MEDICAL CENTER | Age: 66
End: 2021-09-27
Attending: PSYCHIATRY & NEUROLOGY
Payer: COMMERCIAL

## 2021-10-04 ENCOUNTER — HOSPITAL ENCOUNTER (OUTPATIENT)
Dept: RADIOLOGY | Facility: MEDICAL CENTER | Age: 66
End: 2021-10-04
Attending: PSYCHIATRY & NEUROLOGY
Payer: COMMERCIAL

## 2021-10-04 DIAGNOSIS — G40.209 LOCALIZATION-RELATED PARTIAL EPILEPSY WITH COMPLEX PARTIAL SEIZURES (HCC): ICD-10-CM

## 2021-10-04 DIAGNOSIS — G31.84 MILD COGNITIVE IMPAIRMENT: ICD-10-CM

## 2021-10-04 DIAGNOSIS — H53.30 UNSPECIFIED DISORDER OF BINOCULAR VISION: ICD-10-CM

## 2021-10-04 PROCEDURE — 70551 MRI BRAIN STEM W/O DYE: CPT

## 2021-10-18 ENCOUNTER — OFFICE VISIT (OUTPATIENT)
Dept: URGENT CARE | Facility: PHYSICIAN GROUP | Age: 66
End: 2021-10-18
Payer: COMMERCIAL

## 2021-10-18 VITALS
BODY MASS INDEX: 30.96 KG/M2 | WEIGHT: 209 LBS | RESPIRATION RATE: 16 BRPM | HEART RATE: 55 BPM | SYSTOLIC BLOOD PRESSURE: 138 MMHG | HEIGHT: 69 IN | DIASTOLIC BLOOD PRESSURE: 82 MMHG | TEMPERATURE: 98.1 F | OXYGEN SATURATION: 98 %

## 2021-10-18 DIAGNOSIS — L98.499 ULCER OF SKIN OF BREAST (HCC): ICD-10-CM

## 2021-10-18 PROCEDURE — 99213 OFFICE O/P EST LOW 20 MIN: CPT | Performed by: PHYSICIAN ASSISTANT

## 2021-10-18 ASSESSMENT — ENCOUNTER SYMPTOMS
NAUSEA: 0
VOMITING: 0
HEADACHES: 0
EYE DISCHARGE: 0
FEVER: 0
SHORTNESS OF BREATH: 0
COUGH: 0
EYE REDNESS: 0
SORE THROAT: 0

## 2021-10-18 ASSESSMENT — FIBROSIS 4 INDEX: FIB4 SCORE: 2.81

## 2021-10-18 NOTE — PROGRESS NOTES
"Subjective     Gume Wing is a 66 y.o. male who presents with Lump (left breast, stinging pain, x2-3 months, pt states it was red and inflammed, have gone better. )        HPI  This is a new problem.  The patient presents to clinic complaining of irritation to the left breast near the left nipple x2-3 months.  The patient states he initially developed a small lump to the affected area, which he describes as a pimple-like lesion with associated inflammation.  The patient states the inflammation has improved.  The patient reports continued irritation to the affected area, which he describes as a \"stinging\" pain.  The patient reports no palpable lumps/masses.  No pain/tenderness.  No swelling.  No surrounding redness.  No increased warmth.  No discharge/drainage.  No fever.  The patient has not taken any OTC medications for his current symptoms.  He reports no history of same.    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  MEDS:   Current Outpatient Medications:   •  SILODOSIN PO, Take  by mouth., Disp: , Rfl:   •  gabapentin (NEURONTIN) 100 MG Cap, TAKE 1 CAPSULE BY MOUTH AT BEDTIME, Disp: 90 " Capsule, Rfl: 2  •  divalproex ER (DEPAKOTE ER) 500 MG TABLET SR 24 HR, TAKE 1 TABLET BY MOUTH TWICE A DAY, Disp: 180 tablet, Rfl: 3  •  clobetasol (TEMOVATE) 0.05 % Ointment, APPLY TO AFFECTED AREA(S) 2 TIMES A DAY AS NEEDED. APPLY TO KNEES AS NEEDED, Disp: 60 g, Rfl: 3  •  varenicline (CHANTIX CONTINUING MONTH JERI) 1 MG tablet, TAKE 1 TABLET BY MOUTH TWICE A DAY, Disp: 56 tablet, Rfl: 3  •  ibuprofen (MOTRIN) 800 MG Tab, Take 1 tablet by mouth every 8 hours as needed., Disp: 30 tablet, Rfl: 0  •  atorvastatin (LIPITOR) 10 MG Tab, Take 1 Tab by mouth every day., Disp: 100 Tab, Rfl: 3  •  BACTROBAN 2 % Ointment, Apply 1 Application to affected area(s) 2 times a day. (Patient taking differently: Apply 1 Application topically as needed.), Disp: 60 g, Rfl: 3  •  omeprazole (PRILOSEC) 20 MG delayed-release capsule, Take 1 Cap by mouth every day., Disp: 90 Cap, Rfl: 3  •  Cholecalciferol ([employer]S VITAMIN D) 2000 UNIT Cap, Take 1 Cap by mouth every bedtime. For vitamin D deficiency, Disp: 100 Cap, Rfl: 3  •  tamsulosin (FLOMAX) 0.4 MG capsule, Take 0.4 mg by mouth ONE-HALF HOUR AFTER BREAKFAST. (Patient not taking: Reported on 10/18/2021), Disp: , Rfl:   ALLERGIES:   Allergies   Allergen Reactions   • Erythromycin      Severe stomach pain   • Latex Rash     .   • Other Environmental      pollen     SURGHX:   Past Surgical History:   Procedure Laterality Date   • PB RECONSTR TOTAL SHOULDER IMPLANT Left 12/31/2020    Procedure: ARTHROPLASTY, SHOULDER, TOTAL;  Surgeon: Ric Cooper M.D.;  Location: SURGERY [facility];  Service: Orthopedics   • BICEPS TENODESIS Left 12/31/2020    Procedure: TENODESIS, BICEPS;  Surgeon: Ric Cooper M.D.;  Location: [facility];  Service: Orthopedics   • KNEE ARTHROPLASTY TOTAL  9/15/2014    Performed by Cesar Abreu M.D. at [facility] ORS   • KNEE ARTHROPLASTY TOTAL  3/7/2011    right   • CARPAL TUNNEL ENDOSCOPIC  12/1/2009    Performed by RONNIE MAURICIO  "A at SURGERY [facility] ORS   • CERVICAL DISK AND FUSION ANTERIOR  1/26/2009    Performed by AMINA NERI at SURGERY [facility] ORS   • TOENAIL REMOVAL  2008    bilateral great toes   • ARTHROSCOPY, KNEE  1989    right   • HEMORRHOIDECTOMY  1980   • VASECTOMY  1979     SOCHX:  reports that he has been smoking cigarettes. He has a 43.00 pack-year smoking history. He has never used smokeless tobacco. He reports previous alcohol use. He reports that he does not use drugs.  FH: Family history was reviewed, no pertinent findings to report      Review of Systems   Constitutional: Negative for fever.   HENT: Negative for congestion, ear pain and sore throat.    Eyes: Negative for discharge and redness.   Respiratory: Negative for cough and shortness of breath.    Cardiovascular: Negative for chest pain and leg swelling.   Gastrointestinal: Negative for nausea and vomiting.   Musculoskeletal: Negative for joint pain.   Skin: Negative for rash.        + irritation to the skin of left breast   Neurological: Negative for headaches.              Objective     /82   Pulse (!) 55   Temp 36.7 °C (98.1 °F) (Temporal)   Resp 16   Ht 1.753 m (5' 9\")   Wt 94.8 kg (209 lb)   SpO2 98%   BMI 30.86 kg/m²      Physical Exam  Constitutional:       General: He is not in acute distress.     Appearance: Normal appearance. He is well-developed. He is not ill-appearing.   HENT:      Head: Normocephalic and atraumatic.      Right Ear: External ear normal.      Left Ear: External ear normal.   Eyes:      Extraocular Movements: Extraocular movements intact.      Conjunctiva/sclera: Conjunctivae normal.   Cardiovascular:      Rate and Rhythm: Normal rate.   Pulmonary:      Effort: Pulmonary effort is normal.   Chest:          Comments:   Left Breast:  A superficial ulceration is present to the left breast near the border of the areola with scabbing in place.  No tenderness to palpation.  No swelling.  No surrounding erythema.  No " increased warmth.  No active discharge/drainage.  No surrounding induration.  No palpable fluctuance.  The patient appears to have an ingrown hair near the affected area that has worked its way to the surface.  The remainder the patient's left breast exam is normal.  No palpable lumps/masses.  No palpable lymphadenopathy.  Musculoskeletal:         General: Normal range of motion.      Cervical back: Normal range of motion and neck supple.   Skin:     General: Skin is warm and dry.   Neurological:      Mental Status: He is alert and oriented to person, place, and time.                             Assessment & Plan        1. Ulcer of skin of breast (HCC)  - mupirocin (BACTROBAN) 2 % Ointment; Apply 1 Application topically 3 times a day.  Dispense: 22 g; Refill: 0    The patient's presenting symptoms a physical examination consistent with an ulcer of the skin of the breast.  On physical exam, the patient had a superficial ulceration to the left breast near the border of the areola with scabbing in place.  No tenderness palpation, swelling, surrounding erythema, increased warmth, active discharge/drainage, surrounding induration, or palpable fluctuance was appreciated.  The patient appears to have an ingrown hair near the affected area that is worked its way to the surface.  This was removed today in clinic.  The remainder the patient's breast exam is normal.  No palpable lumps/masses were noted.  No palpable lymphadenopathy was present.  The patient appears in no acute distress.  The patient's vital signs are stable and within normal limits.  He is afebrile today in clinic.  The patient's ulceration is superficial and appears to be related to a possible ingrown hair.  Again, this was removed today in clinic.  Will prescribe the patient topical Bactroban for his current symptoms.  Advised patient to monitor for worsening signs and her symptoms.  Recommend OTC medications and supportive care for symptomatic management.   Recommend patient follow-up with his PCP as needed.  Discussed return precautions with the patient, and he verbalized understanding.      Differential diagnoses, supportive care, and indications for immediate follow-up discussed with patient.   Instructed to return to clinic or nearest emergency department for any change in condition, further concerns, or worsening of symptoms.    OTC Tylenol or Motrin for fever/discomfort.  Keep area clean and dry  Monitor for worsening signs and/or symptoms  Follow-up with PCP  Return to clinic or go to the ED if symptoms worsen or fail to improve, or if the patient should have worsening/increasing/persistent ulceration, irritation, pain/redness, swelling, increased redness or warmth, discharge/drainage, palpable lumps/masses, fever/chills, secondary signs of infection, and/or any concerning symptoms.    Discussed plan with the patient, and he agrees to the above.    I personally reviewed prior external notes and test results pertinent to today's visit.  I have independently reviewed and interpreted all diagnostics ordered during this urgent care visit.     Time spent evaluating this patient was at least 30 minutes and includes preparing for visit, obtaining history, exam and evaluation, ordering labs/tests/procedures/medications, independent interpretation, and counseling/education.    Please note that this dictation was created using voice recognition software. I have made every reasonable attempt to correct obvious errors, but I expect that there may be errors of grammar and possibly content that I did not discover before finalizing the note.     This note was electronically signed by Leatha Kinney PA-C

## 2021-11-01 ENCOUNTER — HOSPITAL ENCOUNTER (OUTPATIENT)
Dept: LAB | Facility: MEDICAL CENTER | Age: 66
End: 2021-11-01
Attending: INTERNAL MEDICINE
Payer: COMMERCIAL

## 2021-11-01 DIAGNOSIS — Z11.59 ENCOUNTER FOR HEPATITIS C SCREENING TEST FOR LOW RISK PATIENT: ICD-10-CM

## 2021-11-01 DIAGNOSIS — D69.6 THROMBOCYTOPENIA (HCC): ICD-10-CM

## 2021-11-01 DIAGNOSIS — R41.3 MEMORY LOSS: ICD-10-CM

## 2021-11-01 LAB
FOLATE SERPL-MCNC: 5.5 NG/ML
HCV AB SER QL: NORMAL
TREPONEMA PALLIDUM IGG+IGM AB [PRESENCE] IN SERUM OR PLASMA BY IMMUNOASSAY: NORMAL
TSH SERPL DL<=0.005 MIU/L-ACNC: 2.9 UIU/ML (ref 0.38–5.33)
VIT B12 SERPL-MCNC: 318 PG/ML (ref 211–911)

## 2021-11-01 PROCEDURE — 36415 COLL VENOUS BLD VENIPUNCTURE: CPT

## 2021-11-01 PROCEDURE — 82746 ASSAY OF FOLIC ACID SERUM: CPT

## 2021-11-01 PROCEDURE — 86780 TREPONEMA PALLIDUM: CPT

## 2021-11-01 PROCEDURE — 86803 HEPATITIS C AB TEST: CPT

## 2021-11-01 PROCEDURE — 82607 VITAMIN B-12: CPT

## 2021-11-01 PROCEDURE — 84443 ASSAY THYROID STIM HORMONE: CPT

## 2021-11-08 ENCOUNTER — OFFICE VISIT (OUTPATIENT)
Dept: MEDICAL GROUP | Facility: MEDICAL CENTER | Age: 66
End: 2021-11-08
Payer: COMMERCIAL

## 2021-11-08 VITALS
HEIGHT: 69 IN | DIASTOLIC BLOOD PRESSURE: 82 MMHG | BODY MASS INDEX: 31.44 KG/M2 | SYSTOLIC BLOOD PRESSURE: 124 MMHG | TEMPERATURE: 98.8 F | OXYGEN SATURATION: 97 % | WEIGHT: 212.3 LBS | RESPIRATION RATE: 16 BRPM | HEART RATE: 54 BPM

## 2021-11-08 DIAGNOSIS — R73.03 PREDIABETES: ICD-10-CM

## 2021-11-08 DIAGNOSIS — I70.0 AORTIC ATHEROSCLEROSIS (HCC): ICD-10-CM

## 2021-11-08 DIAGNOSIS — Z23 NEED FOR VACCINATION: ICD-10-CM

## 2021-11-08 DIAGNOSIS — R91.8 ABNORMAL CHEST X-RAY WITH MULTIPLE LUNG NODULES: ICD-10-CM

## 2021-11-08 DIAGNOSIS — D69.6 THROMBOCYTOPENIA (HCC): ICD-10-CM

## 2021-11-08 DIAGNOSIS — R56.9 SEIZURE (HCC): ICD-10-CM

## 2021-11-08 DIAGNOSIS — I10 ESSENTIAL HYPERTENSION, BENIGN: ICD-10-CM

## 2021-11-08 DIAGNOSIS — E78.2 MIXED HYPERLIPIDEMIA: ICD-10-CM

## 2021-11-08 DIAGNOSIS — R79.89 ABNORMAL LFTS (LIVER FUNCTION TESTS): ICD-10-CM

## 2021-11-08 DIAGNOSIS — R41.3 MEMORY LOSS: ICD-10-CM

## 2021-11-08 PROCEDURE — 90471 IMMUNIZATION ADMIN: CPT | Performed by: FAMILY MEDICINE

## 2021-11-08 PROCEDURE — 99214 OFFICE O/P EST MOD 30 MIN: CPT | Mod: 25 | Performed by: FAMILY MEDICINE

## 2021-11-08 PROCEDURE — 90662 IIV NO PRSV INCREASED AG IM: CPT | Performed by: FAMILY MEDICINE

## 2021-11-08 ASSESSMENT — ENCOUNTER SYMPTOMS
CHILLS: 0
ABDOMINAL PAIN: 0
SHORTNESS OF BREATH: 0
CONSTIPATION: 0
BLOOD IN STOOL: 0
FEVER: 0
MEMORY LOSS: 1
DIARRHEA: 0
PALPITATIONS: 0
WHEEZING: 0
COUGH: 0

## 2021-11-08 ASSESSMENT — FIBROSIS 4 INDEX: FIB4 SCORE: 2.81

## 2021-11-08 NOTE — ASSESSMENT & PLAN NOTE
He was previously seen by Dr. Soto and had blood work ordered including syphilis, thyroid function test, vitamin B12 and folate levels.  His syphilis and thyroid function test came back in normal range.  Vitamin B12 and folate level are on the lower end.  He is seeing neurology for further evaluation.  Reports that he had an MRI brain done and he has an appointment for follow-up with them.  He is trying to lose weight and has not been eating breakfast, eats very less lunch and eats less dinner also.  He eats protein bars during lunchtime.  He reports that he lost a lot of weight previously he was to 60 pounds and currently is 2 1 2 pounds.

## 2021-11-08 NOTE — ASSESSMENT & PLAN NOTE
He is currently on Depakote twice daily for seizures.  He is seeing neurology and reports that he had EEG done with them.  He has follow-up appointment with neurology to discuss about these results.

## 2021-11-08 NOTE — PROGRESS NOTES
FAMILY MEDICINE VISIT                                                               Chief complaint::Diagnoses of Memory loss, Seizure (HCC), Abnormal chest x-ray with multiple lung nodules, Aortic atherosclerosis (HCC), Abnormal LFTs (liver function tests), Mixed hyperlipidemia, Essential hypertension, benign, Thrombocytopenia (HCC), Prediabetes, and Need for vaccination were pertinent to this visit.    History of present illness: Mazin Wing is a 66 y.o. male who presented to establish care, lab follow-up.    Memory loss  He was previously seen by Dr. Soto and had blood work ordered including syphilis, thyroid function test, vitamin B12 and folate levels.  His syphilis and thyroid function test came back in normal range.  Vitamin B12 and folate level are on the lower end.  He is seeing neurology for further evaluation.  Reports that he had an MRI brain done and he has an appointment for follow-up with them.  He is trying to lose weight and has not been eating breakfast, eats very less lunch and eats less dinner also.  He eats protein bars during lunchtime.  He reports that he lost a lot of weight previously he was to 60 pounds and currently is 2 1 2 pounds.    Mixed hyperlipidemia  He is currently on atorvastatin 10 mg daily.  Previous cholesterol panel showed low HDL, other cholesterols were in normal range.    Lab Results   Component Value Date/Time    CHOLSTRLTOT 124 03/08/2021 0724    TRIGLYCERIDE 97 03/08/2021 0724    HDL 36 (A) 03/08/2021 0724    LDL 69 03/08/2021 0724       Thrombocytopenia (HCC)  Last CBC showed platelet count at 112, no bleeding.  We will recheck his CBC.    Seizure (HCC)  He is currently on Depakote twice daily for seizures.  He is seeing neurology and reports that he had EEG done with them.  He has follow-up appointment with neurology to discuss about these results.    Prediabetes  Last A1c came back at 5.6.  He is trying to lose weight with diet modification.    Lab Results    Component Value Date/Time    HBA1C 5.6 03/08/2021 07:24 AM        Essential hypertension, benign  Blood pressure today came back at 120/82.  He is currently not on any medication.    Abnormal LFTs (liver function tests)  Review of CMP showed LFT elevated, AST level at 51, other liver function tests were in normal range.  We will recheck his labs.  Does not drink alcohol.    Abnormal chest x-ray with multiple lung nodules  Last CT on 10/10/2016 showed There is mild bronchiectasis. No focal consolidation or pneumothorax is noted. There is mild medial right basilar atelectasis. There are emphysematous changes. There is mild biapical scarring. 5 mm nodule is seen in the right upper lobe on image 116. 3   mm nodule is seen along the minor fissure on image 158. There is mild right middle lobe atelectasis. There is minimal dependent atelectasis on the left. There is an ill-defined somewhat nodular opacity in the lingula measuring 6.5 mm.  Colonic diverticulosis is noted.   Degenerative changes are seen in the spine and bilateral shoulders.   IMPRESSION: Bilateral pulmonary nodules for which follow-up is recommended.    He was using Chantix.  He reports that recently this medication was recalled.  He is currently smoking.  He has smoking history of more than 30 pack years        Review of systems:     Review of Systems   Constitutional: Negative for chills, fever and malaise/fatigue.   HENT: Positive for hearing loss.    Respiratory: Negative for cough, shortness of breath and wheezing.    Cardiovascular: Negative for chest pain, palpitations and leg swelling.   Gastrointestinal: Negative for abdominal pain, blood in stool, constipation and diarrhea.   Skin: Negative for rash.   Psychiatric/Behavioral: Positive for memory loss.        Past Medical, Surgical and Family History:    Past Medical History:   Diagnosis Date   • Abnormal LFTs (liver function tests) 9/14/2020   • Anesthesia     occasionally wakes up agitated/  anxious   • Arthritis    • Atypical pigmented skin lesion 7/30/2019   • Carpal tunnel syndrome     bilateral, uncontrolled   • Cholesterol blood decreased    • Contact dermatitis and other eczema, due to unspecified cause     Chronic, controlled with meds   • Edema extremities 8/22/2014   • Elevated PSA, less than 10 ng/ml 2/25/2019   • Epididymitis     chronic on left   • Esophageal reflux    • Heart burn    • Loss of height     Loss of 1.5 inches 2009,   • Migraine    • 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 11/6/2009    Quit 12/09, then restarted    • Mixed hyperlipidemia    • Osteoarthrosis involving, or with mention of more than one site, but not specified as generalized, multiple sites    • Pain 9/3/14    left knee   • Prediabetes     uncontrolled   • Seborrheic keratoses, inflamed 8/22/2014   • Seizure ([facility]) 2013    seizure is visual changes only, none since medication started in 2013   • Short-term memory loss 1/20/2020   • Snoring    • Thrombocytopenia ([facility]) 2/24/2020   • Tinnitus     chronic with hearing loss   • Unspecified vitamin D deficiency 3/8/2010   • Visual disturbances 1/30/2015     Past Surgical History:   Procedure Laterality Date   • PB RECONSTR TOTAL SHOULDER IMPLANT Left 12/31/2020    Procedure: ARTHROPLASTY, SHOULDER, TOTAL;  Surgeon: Ric Cooper M.D.;  Location: SURGERY [facility];  Service: Orthopedics   • BICEPS TENODESIS Left 12/31/2020    Procedure: TENODESIS, BICEPS;  Surgeon: Ric Cooper M.D.;  Location: [facility];  Service: Orthopedics   • KNEE ARTHROPLASTY TOTAL  9/15/2014    Performed by Cesar Abreu M.D. at [facility]   • KNEE ARTHROPLASTY TOTAL  3/7/2011    right   • CARPAL TUNNEL ENDOSCOPIC  12/1/2009    Performed by RONNIE MAURICIO at [facility] ORS   • CERVICAL DISK AND FUSION ANTERIOR  1/26/2009    Performed by AMINA NERI at SURGERY [facility] ORS   • TOENAIL REMOVAL  2008    bilateral great toes   •  ARTHROSCOPY, KNEE      right   • HEMORRHOIDECTOMY     • VASECTOMY       Family History   Problem Relation Age of Onset   • Cancer Mother    • Lung Cancer Mother    • Breast Cancer Daughter    • Lung Disease Other         Social History:    Social History     Tobacco Use   • Smoking status: Current Every Day Smoker     Packs/day: 1.00     Years: 43.00     Pack years: 43.00     Types: Cigarettes     Last attempt to quit: 2020     Years since quittin.1   • Smokeless tobacco: Never Used   Vaping Use   • Vaping Use: Never used   Substance Use Topics   • Alcohol use: Not Currently     Alcohol/week: 0.0 oz     Comment: rare   • Drug use: No        Medications and Allergies:     Current Outpatient Medications   Medication Sig Dispense Refill   • SILODOSIN PO Take  by mouth.     • mupirocin (BACTROBAN) 2 % Ointment Apply 1 Application topically 3 times a day. 22 g 0   • gabapentin (NEURONTIN) 100 MG Cap TAKE 1 CAPSULE BY MOUTH AT BEDTIME 90 Capsule 2   • divalproex ER (DEPAKOTE ER) 500 MG TABLET SR 24 HR TAKE 1 TABLET BY MOUTH TWICE A  tablet 3   • clobetasol (TEMOVATE) 0.05 % Ointment APPLY TO AFFECTED AREA(S) 2 TIMES A DAY AS NEEDED. APPLY TO KNEES AS NEEDED 60 g 3   • varenicline (CHANTIX CONTINUING MONTH JERI) 1 MG tablet TAKE 1 TABLET BY MOUTH TWICE A DAY 56 tablet 3   • ibuprofen (MOTRIN) 800 MG Tab Take 1 tablet by mouth every 8 hours as needed. 30 tablet 0   • atorvastatin (LIPITOR) 10 MG Tab Take 1 Tab by mouth every day. 100 Tab 3   • BACTROBAN 2 % Ointment Apply 1 Application to affected area(s) 2 times a day. (Patient taking differently: Apply 1 Application topically as needed.) 60 g 3   • omeprazole (PRILOSEC) 20 MG delayed-release capsule Take 1 Cap by mouth every day. 90 Cap 3   • Cholecalciferol ([employer]S VITAMIN D) 2000 UNIT Cap Take 1 Cap by mouth every bedtime. For vitamin D deficiency 100 Cap 3     No current facility-administered medications for this visit.        Allergies  "  Allergen Reactions   • Erythromycin      Severe stomach pain   • Latex Rash     .   • Other Environmental      pollen       Vitals:    /82 (BP Location: Left arm, Patient Position: Sitting, BP Cuff Size: Large adult)   Pulse (!) 54   Temp 37.1 °C (98.8 °F) (Temporal)   Resp 16   Ht 1.753 m (5' 9\")   Wt 96.3 kg (212 lb 4.9 oz)   SpO2 97%  Body mass index is 31.35 kg/m².    Physical Exam:     Physical Exam  Constitutional:       General: He is not in acute distress.  HENT:      Head: Normocephalic and atraumatic.   Eyes:      Conjunctiva/sclera: Conjunctivae normal.   Cardiovascular:      Rate and Rhythm: Normal rate and regular rhythm.      Heart sounds: Normal heart sounds. No murmur heard.  No friction rub. No gallop.    Pulmonary:      Effort: Pulmonary effort is normal. No respiratory distress.      Breath sounds: Normal breath sounds. No wheezing or rales.   Musculoskeletal:      Cervical back: Neck supple.   Skin:     Findings: No rash.   Neurological:      Mental Status: He is alert.      Gait: Gait is intact.   Psychiatric:         Mood and Affect: Mood and affect normal.         Judgment: Judgment normal.          Labs:  I reviewed with patient recent labs folate, hep C, vitamin B12, TSH, syphilis resulted on 11/1/2021    Assessment/Plan:    1. Memory loss  Chronic health problem, reviewed recent blood work.  Recommend to start taking multivitamins as well as vitamin B12 1000 mcg once daily.  Recommended to follow-up with neurology for further evaluation.    - VITAMIN B12; Future  - FOLATE; Future    2. Seizure (HCC)  Chronic health problem, stable, follow-up with neurology.    3. Abnormal chest x-ray with multiple lung nodules  Chronic health problem, ordered CT lung cancer screening due to history of smoking as well as previous CT showed nodules.  Counseled regarding smoking cessation.  Recommended to check with pharmacy regarding Chantix.    - CT-LUNG CANCER-SCREENING; Future    4. Aortic " atherosclerosis (HCC)  Chronic health problem, continue Lipitor 10 mg daily.    5. Abnormal LFTs (liver function tests)  Chronic health problem, recheck CMP to assess liver function test.  If elevated, will do further evaluation.    - Comp Metabolic Panel; Future    6. Mixed hyperlipidemia  Chronic health problem, previous levels were stable.  Continue atorvastatin 10 mg daily.  Recheck labs to assess control.    - Lipid Profile; Future    7. Essential hypertension, benign  Chronic health problem, stable without any medications.  Continue to monitor.  Goal blood pressure less than 140/90.    8. Thrombocytopenia (HCC)  Chronic health problem, recheck CBC to assess for thrombocytopenia.    - CBC WITHOUT DIFFERENTIAL; Future    9. Prediabetes  Chronic health problem, stable.  Discussed with patient about eating healthy diet and including more fruits and vegetables in diet as that has a lot of antioxidants.    - HEMOGLOBIN A1C; Future    10. Need for vaccination  - INFLUENZA VACCINE, HIGH DOSE (65+ ONLY)         Please note that this dictation was created using voice recognition software. I have made every reasonable attempt to correct obvious errors, but I expect that there are errors of grammar and possibly content that I did not discover before finalizing the note.    Follow up in 4 months for lab follow-up.

## 2021-11-08 NOTE — ASSESSMENT & PLAN NOTE
He is currently on atorvastatin 10 mg daily.  Previous cholesterol panel showed low HDL, other cholesterols were in normal range.    Lab Results   Component Value Date/Time    CHOLSTRLTOT 124 03/08/2021 0724    TRIGLYCERIDE 97 03/08/2021 0724    HDL 36 (A) 03/08/2021 0724    LDL 69 03/08/2021 0724

## 2021-11-08 NOTE — ASSESSMENT & PLAN NOTE
Last A1c came back at 5.6.  He is trying to lose weight with diet modification.    Lab Results   Component Value Date/Time    HBA1C 5.6 03/08/2021 07:24 AM

## 2021-11-08 NOTE — ASSESSMENT & PLAN NOTE
Review of CMP showed LFT elevated, AST level at 51, other liver function tests were in normal range.  We will recheck his labs.  Does not drink alcohol.

## 2021-11-08 NOTE — ASSESSMENT & PLAN NOTE
Last CT on 10/10/2016 showed There is mild bronchiectasis. No focal consolidation or pneumothorax is noted. There is mild medial right basilar atelectasis. There are emphysematous changes. There is mild biapical scarring. 5 mm nodule is seen in the right upper lobe on image 116. 3   mm nodule is seen along the minor fissure on image 158. There is mild right middle lobe atelectasis. There is minimal dependent atelectasis on the left. There is an ill-defined somewhat nodular opacity in the lingula measuring 6.5 mm.  Colonic diverticulosis is noted.   Degenerative changes are seen in the spine and bilateral shoulders.   IMPRESSION: Bilateral pulmonary nodules for which follow-up is recommended.    He was using Chantix.  He reports that recently this medication was recalled.  He is currently smoking.  He has smoking history of more than 30 pack years

## 2021-12-16 DIAGNOSIS — E78.2 MIXED HYPERLIPIDEMIA: ICD-10-CM

## 2021-12-17 RX ORDER — ATORVASTATIN CALCIUM 10 MG/1
TABLET, FILM COATED ORAL
Qty: 90 TABLET | Refills: 2 | Status: SHIPPED | OUTPATIENT
Start: 2021-12-17 | End: 2022-06-23

## 2022-03-07 ENCOUNTER — HOSPITAL ENCOUNTER (OUTPATIENT)
Dept: LAB | Facility: MEDICAL CENTER | Age: 67
End: 2022-03-07
Attending: FAMILY MEDICINE
Payer: COMMERCIAL

## 2022-03-07 ENCOUNTER — APPOINTMENT (OUTPATIENT)
Dept: MEDICAL GROUP | Facility: MEDICAL CENTER | Age: 67
End: 2022-03-07
Payer: MEDICARE

## 2022-03-07 DIAGNOSIS — E78.2 MIXED HYPERLIPIDEMIA: ICD-10-CM

## 2022-03-07 DIAGNOSIS — R79.89 ABNORMAL LFTS (LIVER FUNCTION TESTS): ICD-10-CM

## 2022-03-07 DIAGNOSIS — R73.03 PREDIABETES: ICD-10-CM

## 2022-03-07 DIAGNOSIS — D69.6 THROMBOCYTOPENIA (HCC): ICD-10-CM

## 2022-03-07 DIAGNOSIS — R41.3 MEMORY LOSS: ICD-10-CM

## 2022-03-07 LAB
ALBUMIN SERPL BCP-MCNC: 4.3 G/DL (ref 3.2–4.9)
ALBUMIN/GLOB SERPL: 1.7 G/DL
ALP SERPL-CCNC: 69 U/L (ref 30–99)
ALT SERPL-CCNC: 12 U/L (ref 2–50)
ANION GAP SERPL CALC-SCNC: 11 MMOL/L (ref 7–16)
AST SERPL-CCNC: 19 U/L (ref 12–45)
BILIRUB SERPL-MCNC: 0.6 MG/DL (ref 0.1–1.5)
BUN SERPL-MCNC: 12 MG/DL (ref 8–22)
CALCIUM SERPL-MCNC: 9.5 MG/DL (ref 8.5–10.5)
CHLORIDE SERPL-SCNC: 108 MMOL/L (ref 96–112)
CHOLEST SERPL-MCNC: 127 MG/DL (ref 100–199)
CO2 SERPL-SCNC: 25 MMOL/L (ref 20–33)
CREAT SERPL-MCNC: 0.77 MG/DL (ref 0.5–1.4)
ERYTHROCYTE [DISTWIDTH] IN BLOOD BY AUTOMATED COUNT: 47.2 FL (ref 35.9–50)
EST. AVERAGE GLUCOSE BLD GHB EST-MCNC: 114 MG/DL
FASTING STATUS PATIENT QL REPORTED: NORMAL
FOLATE SERPL-MCNC: 18 NG/ML
GLOBULIN SER CALC-MCNC: 2.6 G/DL (ref 1.9–3.5)
GLUCOSE SERPL-MCNC: 98 MG/DL (ref 65–99)
HBA1C MFR BLD: 5.6 % (ref 4–5.6)
HCT VFR BLD AUTO: 46.4 % (ref 42–52)
HDLC SERPL-MCNC: 48 MG/DL
HGB BLD-MCNC: 15.6 G/DL (ref 14–18)
LDLC SERPL CALC-MCNC: 66 MG/DL
MCH RBC QN AUTO: 31.4 PG (ref 27–33)
MCHC RBC AUTO-ENTMCNC: 33.6 G/DL (ref 33.7–35.3)
MCV RBC AUTO: 93.4 FL (ref 81.4–97.8)
PLATELET # BLD AUTO: 97 K/UL (ref 164–446)
PMV BLD AUTO: 12.1 FL (ref 9–12.9)
POTASSIUM SERPL-SCNC: 4.2 MMOL/L (ref 3.6–5.5)
PROT SERPL-MCNC: 6.9 G/DL (ref 6–8.2)
RBC # BLD AUTO: 4.97 M/UL (ref 4.7–6.1)
SODIUM SERPL-SCNC: 144 MMOL/L (ref 135–145)
TRIGL SERPL-MCNC: 63 MG/DL (ref 0–149)
VIT B12 SERPL-MCNC: 496 PG/ML (ref 211–911)
WBC # BLD AUTO: 7.2 K/UL (ref 4.8–10.8)

## 2022-03-07 PROCEDURE — 80053 COMPREHEN METABOLIC PANEL: CPT

## 2022-03-07 PROCEDURE — 83036 HEMOGLOBIN GLYCOSYLATED A1C: CPT

## 2022-03-07 PROCEDURE — 82746 ASSAY OF FOLIC ACID SERUM: CPT

## 2022-03-07 PROCEDURE — 85027 COMPLETE CBC AUTOMATED: CPT

## 2022-03-07 PROCEDURE — 82607 VITAMIN B-12: CPT

## 2022-03-07 PROCEDURE — 80061 LIPID PANEL: CPT

## 2022-03-07 PROCEDURE — 36415 COLL VENOUS BLD VENIPUNCTURE: CPT

## 2022-03-14 ENCOUNTER — OFFICE VISIT (OUTPATIENT)
Dept: MEDICAL GROUP | Facility: MEDICAL CENTER | Age: 67
End: 2022-03-14
Payer: COMMERCIAL

## 2022-03-14 VITALS
OXYGEN SATURATION: 98 % | WEIGHT: 202.82 LBS | BODY MASS INDEX: 30.04 KG/M2 | SYSTOLIC BLOOD PRESSURE: 110 MMHG | HEART RATE: 63 BPM | RESPIRATION RATE: 17 BRPM | HEIGHT: 69 IN | DIASTOLIC BLOOD PRESSURE: 56 MMHG | TEMPERATURE: 97.5 F

## 2022-03-14 DIAGNOSIS — E78.2 MIXED HYPERLIPIDEMIA: ICD-10-CM

## 2022-03-14 DIAGNOSIS — F17.201 MILD TOBACCO ABUSE IN EARLY REMISSION: ICD-10-CM

## 2022-03-14 DIAGNOSIS — R73.03 PREDIABETES: ICD-10-CM

## 2022-03-14 DIAGNOSIS — D69.6 THROMBOCYTOPENIA (HCC): ICD-10-CM

## 2022-03-14 DIAGNOSIS — I10 ESSENTIAL HYPERTENSION, BENIGN: ICD-10-CM

## 2022-03-14 PROBLEM — R79.89 ABNORMAL LFTS (LIVER FUNCTION TESTS): Status: RESOLVED | Noted: 2020-09-14 | Resolved: 2022-03-14

## 2022-03-14 PROCEDURE — 99214 OFFICE O/P EST MOD 30 MIN: CPT | Performed by: FAMILY MEDICINE

## 2022-03-14 RX ORDER — ZONISAMIDE 100 MG/1
CAPSULE ORAL
COMMUNITY
Start: 2022-03-11

## 2022-03-14 ASSESSMENT — ENCOUNTER SYMPTOMS
CHILLS: 0
FEVER: 0

## 2022-03-14 ASSESSMENT — PATIENT HEALTH QUESTIONNAIRE - PHQ9: CLINICAL INTERPRETATION OF PHQ2 SCORE: 0

## 2022-03-14 ASSESSMENT — FIBROSIS 4 INDEX: FIB4 SCORE: 3.73

## 2022-03-14 NOTE — ASSESSMENT & PLAN NOTE
Recommended to stop Chantix as recently taken off the market.  Counseled regarding smoking cessation.  Discussed about habit changes.  Recommended due to lung cancer screening ordered at last visit

## 2022-03-14 NOTE — ASSESSMENT & PLAN NOTE
Chronical problem, currently asymptomatic, recommended to discuss about thrombocytopenia with neurology regarding medication change.  Continue to monitor.  Repeat labs in 4 months.

## 2022-03-14 NOTE — PROGRESS NOTES
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    History of present illness: Mazin Wing is a 66 y.o. male who presented for lab follow-up.    Problem   Thrombocytopenia (Hcc)    Recent labs showed a platelet count at 92.  Previously his platelet count has also been on lower end.  He is on Depakote and currently following with neurology Dr. Looney for seizures and memory problems.  Other counts are in normal range.  No bleeding or bruising episodes.     Mild Tobacco Abuse in Early Remission    Is currently taking Chantix which the took off the market recently.  He previously tried nicotine patches, and gums     Essential Hypertension, Benign    Blood pressure today came back at 110/56.  He is not on any medication.  He is working on weight loss and lost about 12 pounds since last visit.     Prediabetes    He is working on weight loss.  He lost 12 pounds since last visit.  He is eating more healthy diet.    Lab Results   Component Value Date/Time    HBA1C 5.6 03/07/2022 07:22 AM         Mixed Hyperlipidemia      Recent lipid panel showed cholesterol levels in normal range.  He is on atorvastatin 10 mg daily.  Doing well with this medication.  Lab Results   Component Value Date/Time    CHOLSTRLTOT 127 03/07/2022 0722    TRIGLYCERIDE 63 03/07/2022 0722    HDL 48 03/07/2022 0722    LDL 66 03/07/2022 0722        Abnormal Lfts (Liver Function Tests) (Resolved)     Review of systems:     Review of Systems   Constitutional: Negative for chills, fever and malaise/fatigue.        Medications and Allergies:     Current Outpatient Medications   Medication Sig Dispense Refill   • atorvastatin (LIPITOR) 10 MG Tab TAKE 1 TABLET BY MOUTH EVERY DAY 90 Tablet 2   • SILODOSIN PO Take  by mouth.     •  "gabapentin (NEURONTIN) 100 MG Cap TAKE 1 CAPSULE BY MOUTH AT BEDTIME 90 Capsule 2   • divalproex ER (DEPAKOTE ER) 500 MG TABLET SR 24 HR TAKE 1 TABLET BY MOUTH TWICE A  tablet 3   • ibuprofen (MOTRIN) 800 MG Tab Take 1 tablet by mouth every 8 hours as needed. 30 tablet 0   • BACTROBAN 2 % Ointment Apply 1 Application to affected area(s) 2 times a day. (Patient taking differently: Apply 1 Application topically as needed.) 60 g 3   • omeprazole (PRILOSEC) 20 MG delayed-release capsule Take 1 Cap by mouth every day. 90 Cap 3   • Cholecalciferol ([employer] VITAMIN D) 2000 UNIT Cap Take 1 Cap by mouth every bedtime. For vitamin D deficiency 100 Cap 3   • zonisamide (ZONEGRAN) 100 MG Cap      • mupirocin (BACTROBAN) 2 % Ointment Apply 1 Application topically 3 times a day. (Patient not taking: Reported on 3/14/2022) 22 g 0   • clobetasol (TEMOVATE) 0.05 % Ointment APPLY TO AFFECTED AREA(S) 2 TIMES A DAY AS NEEDED. APPLY TO KNEES AS NEEDED (Patient not taking: Reported on 3/14/2022) 60 g 3     No current facility-administered medications for this visit.          Vitals:    /56 (BP Location: Left arm, Patient Position: Sitting, BP Cuff Size: Adult)   Pulse 63   Temp 36.4 °C (97.5 °F)   Resp 17   Ht 1.753 m (5' 9\")   Wt 92 kg (202 lb 13.2 oz)   SpO2 98%  Body mass index is 29.95 kg/m².    Physical Exam:     Physical Exam  Constitutional:       General: He is not in acute distress.  HENT:      Head: Normocephalic and atraumatic.   Eyes:      Conjunctiva/sclera: Conjunctivae normal.   Cardiovascular:      Rate and Rhythm: Normal rate.   Pulmonary:      Effort: Pulmonary effort is normal. No respiratory distress.   Musculoskeletal:         General: No deformity.      Cervical back: Neck supple.   Skin:     Findings: No rash.   Neurological:      Mental Status: He is alert.      Gait: Gait is intact.   Psychiatric:         Mood and Affect: Mood and affect normal.         Judgment: Judgment normal.          Labs:  I " reviewed with patient recent labs resulted on 3/7/2022    Assessment/Plan:    Problem List Items Addressed This Visit     Essential hypertension, benign     Chronic health problem, Stable without medications. 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  Counseled regarding smoking cessation.  Discussed about habit changes.  Recommended due to lung cancer screening ordered at last visit         Mixed hyperlipidemia     Chronic health problem, stable, continue same medication regimen.         Prediabetes     Chronic health problem, stable, continue to work on diet and exercise.         Thrombocytopenia (HCC)     Chronical problem, currently asymptomatic, recommended to discuss about thrombocytopenia with neurology regarding medication change.  Continue to monitor.  Repeat labs in 4 months.         Relevant Orders    CBC WITHOUT DIFFERENTIAL           Please note that this dictation was created using voice recognition software. I have made every reasonable attempt to correct obvious errors, but I expect that there are errors of grammar and possibly content that I did not discover before finalizing the note.    Follow up in 4 months for lab follow-up and smoking

## 2022-05-09 ENCOUNTER — HOSPITAL ENCOUNTER (OUTPATIENT)
Dept: LAB | Facility: MEDICAL CENTER | Age: 67
End: 2022-05-09
Attending: FAMILY MEDICINE
Payer: COMMERCIAL

## 2022-05-09 DIAGNOSIS — D69.6 THROMBOCYTOPENIA (HCC): ICD-10-CM

## 2022-05-09 LAB
ERYTHROCYTE [DISTWIDTH] IN BLOOD BY AUTOMATED COUNT: 45.1 FL (ref 35.9–50)
HCT VFR BLD AUTO: 44.1 % (ref 42–52)
HGB BLD-MCNC: 15 G/DL (ref 14–18)
MCH RBC QN AUTO: 32.3 PG (ref 27–33)
MCHC RBC AUTO-ENTMCNC: 34 G/DL (ref 33.7–35.3)
MCV RBC AUTO: 94.8 FL (ref 81.4–97.8)
PLATELET # BLD AUTO: 125 K/UL (ref 164–446)
PMV BLD AUTO: 12 FL (ref 9–12.9)
RBC # BLD AUTO: 4.65 M/UL (ref 4.7–6.1)
WBC # BLD AUTO: 6.8 K/UL (ref 4.8–10.8)

## 2022-05-09 PROCEDURE — 36415 COLL VENOUS BLD VENIPUNCTURE: CPT

## 2022-05-09 PROCEDURE — 85027 COMPLETE CBC AUTOMATED: CPT

## 2022-05-23 ENCOUNTER — APPOINTMENT (OUTPATIENT)
Dept: MEDICAL GROUP | Facility: MEDICAL CENTER | Age: 67
End: 2022-05-23
Payer: MEDICARE

## 2022-05-23 NOTE — PROGRESS NOTES
Subjective:     CC: surgery clearance and lab results    HPI:   Mazin presents today for surgery clearance and lab results    Problem   Pre-Op Evaluation    Acute condition. He is scheduled for a left hip replacement surgery 07/2022 by [facility]. He has had other orthopedic surgeries in the past. He has had left hip pain due to bone on bone arthritis for about 6 months. This will be his first surgery on the left hip.    Patient denies chest pain, shortness of breath. Denies history of heart disease or stroke.     Thrombocytopenia (Hcc)    Chronic condition. Most recent platelet count 125 (05/2022). This is felt to be secondary to Depakote which he discontinued for about 2-3 months now. He denies regular bleeding episodes.    Previously his platelet count has also been on lower end.  He is on Depakote and currently following with neurology Dr. Looney for seizures and memory problems.  Other counts are in normal range.  No bleeding or bruising episodes.         Current Outpatient Medications Ordered in Epic   Medication Sig Dispense Refill   • zonisamide (ZONEGRAN) 100 MG Cap      • atorvastatin (LIPITOR) 10 MG Tab TAKE 1 TABLET BY MOUTH EVERY DAY 90 Tablet 2   • SILODOSIN PO Take  by mouth.     • clobetasol (TEMOVATE) 0.05 % Ointment APPLY TO AFFECTED AREA(S) 2 TIMES A DAY AS NEEDED. APPLY TO KNEES AS NEEDED 60 g 3   • omeprazole (PRILOSEC) 20 MG delayed-release capsule Take 1 Cap by mouth every day. 90 Cap 3   • Cholecalciferol ([employer]S VITAMIN D) 2000 UNIT Cap Take 1 Cap by mouth every bedtime. For vitamin D deficiency 100 Cap 3     No current Epic-ordered facility-administered medications on file.     ROS:  Gen: no fevers/chills  Pulm: no sob, no cough  CV: no chest pain, no palpitations  GI: no nausea/vomiting, no diarrhea  MSk: + chronic left hip pain  Heme/Lymph: no easy bruising    Objective:     Exam:  /68 (BP Location: Left arm, Patient Position: Sitting, BP  "Cuff Size: Adult)   Pulse (!) 59   Temp 36.6 °C (97.9 °F) (Temporal)   Resp 16   Ht 1.753 m (5' 9\")   Wt 88.5 kg (195 lb 1.7 oz)   SpO2 97%   BMI 28.81 kg/m²  Body mass index is 28.81 kg/m².    Gen: Alert and oriented, No apparent distress.  Lungs: Normal effort, CTA bilaterally, no wheezes, rhonchi, or rales  CV: Regular rate and rhythm. No murmurs, rubs, or gallops.  Ext: No clubbing, cyanosis, edema.  MSK:   Positive left hip pain with internal and external rotation.    Labs:   Lab Results   Component Value Date/Time    WBC 6.8 05/09/2022 08:06 AM    RBC 4.65 (L) 05/09/2022 08:06 AM    HEMOGLOBIN 15.0 05/09/2022 08:06 AM    HEMATOCRIT 44.1 05/09/2022 08:06 AM    MCV 94.8 05/09/2022 08:06 AM    MCH 32.3 05/09/2022 08:06 AM    MCHC 34.0 05/09/2022 08:06 AM    RDW 45.1 05/09/2022 08:06 AM    PLATELETCT 125 (L) 05/09/2022 08:06 AM    MPV 12.0 05/09/2022 08:06 AM      Echocardiogram 06/2021:  Left ventricular ejection fraction is visually estimated to be 65%.  Mildly dilated left atrium.  Mild aortic insufficiency.  Estimated right ventricular systolic pressure is 33 mmHg.    RCRI 0    Assessment & Plan:     66 y.o. male with the following -     1. Pre-op evaluation  Acute ignacio. He is planned for elective left hip replacement surgery for chronic left hip arthritic pain scheduled for 07/2022. He will get his pre-surgery lab ordered by his surgeon closer to his surgery.  -The patient does not have any major cardiac contraindications to non-emergent surgery and is scheduled for an elective and moderate risk procedure with a class I risk (RCRI of 0) clinical cardiac predictors   -Recent echocardiogram which reported normal systolic function without major valvular abnormalities on 6/14/2021   -Therefore, the cardiac risk is 3.9% of anticipated cardiovascular events (MACE) which is considered Class I low risk   -Patient is low risk of cardiovascular complications for moderate risk surgery.  -No further cardiac work " up or medical optimization is indicated at this point and patient may proceed to surgery at the discretion of his surgeon     2. Thrombocytopenia (HCC)  Chronic condition, stable and improved. Otherwise asymptomatic. Possibly secondary to his Depakote in the past.  - continue interval monitoring    Return if symptoms worsen or fail to improve.    Please note that this dictation was created using voice recognition software. I have made every reasonable attempt to correct obvious errors, but I expect that there are errors of grammar and possibly content that I did not discover before finalizing the note.

## 2022-05-24 ENCOUNTER — OFFICE VISIT (OUTPATIENT)
Dept: MEDICAL GROUP | Facility: MEDICAL CENTER | Age: 67
End: 2022-05-24
Payer: COMMERCIAL

## 2022-05-24 VITALS
BODY MASS INDEX: 28.9 KG/M2 | TEMPERATURE: 97.9 F | DIASTOLIC BLOOD PRESSURE: 68 MMHG | HEART RATE: 59 BPM | OXYGEN SATURATION: 97 % | SYSTOLIC BLOOD PRESSURE: 122 MMHG | HEIGHT: 69 IN | RESPIRATION RATE: 16 BRPM | WEIGHT: 195.11 LBS

## 2022-05-24 DIAGNOSIS — Z01.818 PRE-OP EVALUATION: ICD-10-CM

## 2022-05-24 DIAGNOSIS — D69.6 THROMBOCYTOPENIA (HCC): ICD-10-CM

## 2022-05-24 PROCEDURE — 99214 OFFICE O/P EST MOD 30 MIN: CPT | Performed by: STUDENT IN AN ORGANIZED HEALTH CARE EDUCATION/TRAINING PROGRAM

## 2022-05-24 RX ORDER — AMOXICILLIN 500 MG/1
CAPSULE ORAL
COMMUNITY
Start: 2022-05-05 | End: 2022-05-24

## 2022-05-24 RX ORDER — TRAMADOL HYDROCHLORIDE 50 MG/1
50 TABLET ORAL EVERY 6 HOURS PRN
COMMUNITY
Start: 2022-04-01 | End: 2022-05-24

## 2022-05-24 RX ORDER — VARENICLINE TARTRATE 1 MG/1
TABLET, FILM COATED ORAL
COMMUNITY
Start: 2022-05-13 | End: 2022-05-24

## 2022-05-24 ASSESSMENT — FIBROSIS 4 INDEX: FIB4 SCORE: 2.9

## 2022-06-21 RX ORDER — OMEPRAZOLE 20 MG/1
CAPSULE, DELAYED RELEASE ORAL
Qty: 90 CAPSULE | Refills: 3 | Status: SHIPPED | OUTPATIENT
Start: 2022-06-21 | End: 2024-01-09

## 2022-06-23 ENCOUNTER — OFFICE VISIT (OUTPATIENT)
Dept: CARDIOLOGY | Facility: PHYSICIAN GROUP | Age: 67
End: 2022-06-23
Payer: COMMERCIAL

## 2022-06-23 VITALS
SYSTOLIC BLOOD PRESSURE: 124 MMHG | BODY MASS INDEX: 28.44 KG/M2 | HEIGHT: 69 IN | DIASTOLIC BLOOD PRESSURE: 74 MMHG | RESPIRATION RATE: 14 BRPM | WEIGHT: 192 LBS | HEART RATE: 63 BPM | OXYGEN SATURATION: 98 %

## 2022-06-23 DIAGNOSIS — E78.2 MIXED HYPERLIPIDEMIA: ICD-10-CM

## 2022-06-23 DIAGNOSIS — I35.1 MILD AORTIC INSUFFICIENCY: ICD-10-CM

## 2022-06-23 DIAGNOSIS — R73.03 PREDIABETES: ICD-10-CM

## 2022-06-23 DIAGNOSIS — I10 ESSENTIAL HYPERTENSION, BENIGN: ICD-10-CM

## 2022-06-23 DIAGNOSIS — D69.6 THROMBOCYTOPENIA (HCC): ICD-10-CM

## 2022-06-23 DIAGNOSIS — Z01.810 PRE-OPERATIVE CARDIOVASCULAR EXAMINATION: ICD-10-CM

## 2022-06-23 DIAGNOSIS — I70.0 AORTIC ATHEROSCLEROSIS (HCC): ICD-10-CM

## 2022-06-23 PROBLEM — E66.3 OVERWEIGHT: Status: ACTIVE | Noted: 2017-12-11

## 2022-06-23 PROCEDURE — 99214 OFFICE O/P EST MOD 30 MIN: CPT | Performed by: INTERNAL MEDICINE

## 2022-06-23 RX ORDER — OMEPRAZOLE 20 MG/1
20 CAPSULE, DELAYED RELEASE ORAL DAILY
COMMUNITY
End: 2022-06-23

## 2022-06-23 RX ORDER — ZONISAMIDE 100 MG/1
100 CAPSULE ORAL
COMMUNITY
End: 2022-06-23

## 2022-06-23 RX ORDER — ATORVASTATIN CALCIUM 10 MG/1
10 TABLET, FILM COATED ORAL
Qty: 90 TABLET | Refills: 3 | Status: SHIPPED | OUTPATIENT
Start: 2022-06-23 | End: 2023-06-08 | Stop reason: SDUPTHER

## 2022-06-23 RX ORDER — ATORVASTATIN CALCIUM 10 MG/1
10 TABLET, FILM COATED ORAL DAILY
COMMUNITY
End: 2022-06-23

## 2022-06-23 ASSESSMENT — FIBROSIS 4 INDEX: FIB4 SCORE: 2.9

## 2022-06-23 ASSESSMENT — ENCOUNTER SYMPTOMS
HEARTBURN: 1
BACK PAIN: 1

## 2022-06-23 NOTE — PROGRESS NOTES
Cardiology Follow-up Consultation Note    Date of note:    6/23/2022    Primary Care Provider: Brandon Stephenson M.D.  Referring Provider: Dr. Colbert    Patient Name: Mazin Wing   YOB: 1955  MRN:              [id number]    Chief Complaint: dyslipidemia.     History of Present Illness: Mazin Wing is a 66 y.o. male whose current medical problems include smoking, hypertension, hyperlipidemia, right bundle branch block who is here for follow-up.    Last seen by Dr. Riana Colbert on 6/24/2021.    Interim Events:  Walks with  [employer] without chest pain symptoms, now pending hip replacement.     In terms of smoking, quit for a year, but restarted again a bit due to hip pain. At 1/2 pack a day as opposed to 1 ppd.     Blood pressure stable at home.       Review of Systems   HENT: Positive for hearing loss and tinnitus.    Musculoskeletal: Positive for back pain and joint pain.   Gastrointestinal: Positive for heartburn.   Allergic/Immunologic: Positive for environmental allergies.         All other systems reviewed and discussed using a comprehensive questionnaire and are negative.       Past Medical History:   Diagnosis Date   • Abnormal LFTs (liver function tests) 9/14/2020   • Anesthesia     occasionally wakes up agitated/ anxious   • Arthritis    • Atypical pigmented skin lesion 7/30/2019   • Carpal tunnel syndrome     bilateral, uncontrolled   • Cholesterol blood decreased    • Contact dermatitis and other eczema, due to unspecified cause     Chronic, controlled with meds   • Edema extremities 8/22/2014   • Elevated PSA, less than 10 ng/ml 2/25/2019   • Epididymitis     chronic on left   • Esophageal reflux    • Heart burn    • Loss of height     Loss of 1.5 inches 2009,   • Migraine    • 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 11/6/2009    Quit 12/09, then restarted    • Mixed hyperlipidemia    • Osteoarthrosis involving, or with mention of more than one site, but not  specified as generalized, multiple sites    • Pain 9/3/14    left knee   • Prediabetes     uncontrolled   • Seborrheic keratoses, inflamed 8/22/2014   • Seizure ([facility]) 2013    seizure is visual changes only, none since medication started in 2013   • Short-term memory loss 1/20/2020   • Snoring    • Thrombocytopenia ([facility]) 2/24/2020   • Tinnitus     chronic with hearing loss   • Unspecified vitamin D deficiency 3/8/2010   • Visual disturbances 1/30/2015         Past Surgical History:   Procedure Laterality Date   • PB RECONSTR TOTAL SHOULDER IMPLANT Left 12/31/2020    Procedure: ARTHROPLASTY, SHOULDER, TOTAL;  Surgeon: Ric Cooper M.D.;  Location: SURGERY [facility];  Service: Orthopedics   • BICEPS TENODESIS Left 12/31/2020    Procedure: TENODESIS, BICEPS;  Surgeon: Ric Cooper M.D.;  Location: [facility];  Service: Orthopedics   • KNEE ARTHROPLASTY TOTAL  9/15/2014    Performed by Cesar Abreu M.D. at [facility] ORS   • KNEE ARTHROPLASTY TOTAL  3/7/2011    right   • CARPAL TUNNEL ENDOSCOPIC  12/1/2009    Performed by RONNIE MAURICIO at [facility] ORS   • CERVICAL DISK AND FUSION ANTERIOR  1/26/2009    Performed by AMINA NERI at SURGERY [facility] ORS   • TOENAIL REMOVAL  2008    bilateral great toes   • ARTHROSCOPY, KNEE  1989    right   • HEMORRHOIDECTOMY  1980   • VASECTOMY  1979         Current Outpatient Medications   Medication Sig Dispense Refill   • omeprazole (PRILOSEC) 20 MG delayed-release capsule TAKE 1 CAPSULE BY MOUTH EVERY DAY 90 Capsule 3   • zonisamide (ZONEGRAN) 100 MG Cap      • atorvastatin (LIPITOR) 10 MG Tab TAKE 1 TABLET BY MOUTH EVERY DAY 90 Tablet 2   • SILODOSIN PO Take  by mouth.     • clobetasol (TEMOVATE) 0.05 % Ointment APPLY TO AFFECTED AREA(S) 2 TIMES A DAY AS NEEDED. APPLY TO KNEES AS NEEDED 60 g 3   • Cholecalciferol ([employer]S VITAMIN D) 2000 UNIT Cap Take 1 Cap by mouth every bedtime. For vitamin D deficiency 100 Cap 3   •  atorvastatin (LIPITOR) 10 MG Tab Take 10 mg by mouth every day.     • Cholecalciferol 2000 UNIT Tab Take 2,000 Units by mouth every day.     • omeprazole (PRILOSEC) 20 MG delayed-release capsule Take 20 mg by mouth every day.     • zonisamide (ZONEGRAN) 100 MG Cap Take 100 mg by mouth.       No current facility-administered medications for this visit.         Allergies   Allergen Reactions   • Erythromycin      Severe stomach pain   • Latex Rash     .   • Other Environmental      pollen         Family History   Problem Relation Age of Onset   • Cancer Mother    • Lung Cancer Mother    • Breast Cancer Daughter    • Lung Disease Other          Social History     Socioeconomic History   • Marital status:      Spouse name: Not on file   • Number of children: Not on file   • Years of education: Not on file   • Highest education level: Not on file   Occupational History   • Not on file   Tobacco Use   • Smoking status: Current Every Day Smoker     Packs/day: 1.00     Years: 43.00     Pack years: 43.00     Types: Cigarettes     Last attempt to quit: 2020     Years since quittin.8   • Smokeless tobacco: Never Used   Vaping Use   • Vaping Use: Never used   Substance and Sexual Activity   • Alcohol use: Not Currently     Alcohol/week: 0.0 oz     Comment: rare   • Drug use: No   • Sexual activity: Yes     Partners: Female     Birth control/protection: Post-Menopausal     Comment: , 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[employer]   Other Topics Concern   •  Service Not Asked   • Blood Transfusions Not Asked   • Caffeine Concern Not Asked   • Occupational Exposure Not Asked   • Hobby Hazards Not Asked   • Sleep Concern Not Asked   • Stress Concern Not Asked   • Weight Concern Not Asked   • Special Diet Not Asked   • Back Care Not Asked   • Exercise No   • Bike Helmet Not Asked   • Seat Belt Not Asked   • Self-Exams Not Asked   Social History Narrative    , 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[employer]  "    Social Determinants of Health     Financial Resource Strain: Not on file   Food Insecurity: Not on file   Transportation Needs: Not on file   Physical Activity: Not on file   Stress: Not on file   Social Connections: Not on file   Intimate Partner Violence: Not on file   Housing Stability: Not on file         Physical Exam:  Ambulatory Vitals  /74 (BP Location: Left arm, Patient Position: Sitting, BP Cuff Size: Adult)   Pulse 63   Resp 14   Ht 1.753 m (5' 9\")   Wt 87.1 kg (192 lb)   SpO2 98%    Oxygen Therapy:  Pulse Oximetry: 98 %  BP Readings from Last 4 Encounters:   06/23/22 124/74   05/24/22 122/68   03/14/22 110/56   11/08/21 124/82       Weight/BMI: Body mass index is 28.35 kg/m².  Wt Readings from Last 4 Encounters:   06/23/22 87.1 kg (192 lb)   05/24/22 88.5 kg (195 lb 1.7 oz)   03/14/22 92 kg (202 lb 13.2 oz)   11/08/21 96.3 kg (212 lb 4.9 oz)       General: No apparent distress  Eyes: nl conjunctiva  ENT: OP covered by mask.   Neck: JVP <8 cm H2O  Lungs: normal respiratory effort, CTAB  Heart: RRR, no murmurs, no rubs or gallops, no edema bilateral lower extremities. No LV/RV heave on cardiac palpatation. 2+ bilateral radial pulses.     Abdomen: soft, non tender, non distended, no masses, normal bowel sounds.  No HSM.  Extremities/MSK: no clubbing, no cyanosis  Neurological: No focal sensory deficits  Psychiatric: Appropriate affect, A/O x 3, intact judgement and insight  Skin: Warm extremities    Lab Data Review:  Lab Results   Component Value Date/Time    CHOLSTRLTOT 127 03/07/2022 07:22 AM    LDL 66 03/07/2022 07:22 AM    HDL 48 03/07/2022 07:22 AM    TRIGLYCERIDE 63 03/07/2022 07:22 AM       Lab Results   Component Value Date/Time    SODIUM 144 03/07/2022 07:22 AM    POTASSIUM 4.2 03/07/2022 07:22 AM    CHLORIDE 108 03/07/2022 07:22 AM    CO2 25 03/07/2022 07:22 AM    GLUCOSE 98 03/07/2022 07:22 AM    BUN 12 03/07/2022 07:22 AM    CREATININE 0.77 03/07/2022 07:22 AM     Lab Results "   Component Value Date/Time    ALKPHOSPHAT 69 2022 07:22 AM    ASTSGOT 19 2022 07:22 AM    ALTSGPT 12 2022 07:22 AM    TBILIRUBIN 0.6 2022 07:22 AM      Lab Results   Component Value Date/Time    WBC 6.8 2022 08:06 AM     No components found for: HBGA1C  No components found for: TROPONIN  No results found for: BNP      Cardiac Imaging and Procedures Review:    Echo dated 2021:   CONCLUSIONS  Left ventricular ejection fraction is visually estimated to be 65%.  Mildly dilated left atrium.  Mild aortic insufficiency.  Estimated right ventricular systolic pressure is 33 mmHg.     No prior study is available for comparison.    Radiology test Review:  Brain MRI 10/2021:     1. Age-related cerebral atrophy.     2. Mild periventricular white matter changes consistent with chronic microvascular ischemic gliosis.      Medical Decision Makin. Aortic atherosclerosis (HCC)  Continue statin    2. Essential hypertension, benign  Well controlled now with >80 pounds of weight loss just with diet changes and exercise.     3. Mixed hyperlipidemia  Continue statin    4. Prediabetes  Resolved with weight loss.     5. Thrombocytopenia (HCC)  Improving  -CTM    6. Pre-operative cardiovascular examination  Based on RCRI risk scoring, the patient is low risk of cardiovascular complications for low to moderate risk surgery or procedures.  he is medically optimized based on my last in person assessment and if he has no new symptoms, surgery should proceed without further cardiac work-up.      7. Mild aortic insufficiency  Recheck in 3 years.       Return in about 1 year (around 2023).      Devin Evans MD, [facility]  [facility], [facility]  [address] [address]  Phone: [phone]  Fax: [phone]

## 2022-06-23 NOTE — LETTER
[facility] [address]  Phone: [phone]  Fax: [phone]              Mazin Wing  1955    Encounter Date: 6/23/2022    Devin Evans M.D.          PROGRESS NOTE:      Cardiology Follow-up Consultation Note    Date of note:    6/23/2022    Primary Care Provider: Brandon Stephenson M.D.  Referring Provider: Dr. Colbert    Patient Name: Mazin Wing   YOB: 1955  MRN:              [id number]    Chief Complaint: dyslipidemia.     History of Present Illness: Mazin Wing is a 66 y.o. male whose current medical problems include smoking, hypertension, hyperlipidemia, right bundle branch block who is here for follow-up.    Last seen by Dr. Riana Colbert on 6/24/2021.    Interim Events:  Walks with  [employer] without chest pain symptoms, now pending hip replacement.     In terms of smoking, quit for a year, but restarted again a bit due to hip pain. At 1/2 pack a day as opposed to 1 ppd.     Blood pressure stable at home.       Review of Systems   HENT: Positive for hearing loss and tinnitus.    Musculoskeletal: Positive for back pain and joint pain.   Gastrointestinal: Positive for heartburn.   Allergic/Immunologic: Positive for environmental allergies.         All other systems reviewed and discussed using a comprehensive questionnaire and are negative.       Past Medical History:   Diagnosis Date   • Abnormal LFTs (liver function tests) 9/14/2020   • Anesthesia     occasionally wakes up agitated/ anxious   • Arthritis    • Atypical pigmented skin lesion 7/30/2019   • Carpal tunnel syndrome     bilateral, uncontrolled   • Cholesterol blood decreased    • Contact dermatitis and other eczema, due to unspecified cause     Chronic, controlled with meds   • Edema extremities 8/22/2014   • Elevated PSA, less than 10 ng/ml 2/25/2019   • Epididymitis     chronic on left   • Esophageal  reflux    • Heart burn    • Loss of height     Loss of 1.5 inches 2009,   • Migraine    • 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 11/6/2009    Quit 12/09, then restarted    • Mixed hyperlipidemia    • Osteoarthrosis involving, or with mention of more than one site, but not specified as generalized, multiple sites    • Pain 9/3/14    left knee   • Prediabetes     uncontrolled   • Seborrheic keratoses, inflamed 8/22/2014   • Seizure ([facility]) 2013    seizure is visual changes only, none since medication started in 2013   • Short-term memory loss 1/20/2020   • Snoring    • Thrombocytopenia ([facility]) 2/24/2020   • Tinnitus     chronic with hearing loss   • Unspecified vitamin D deficiency 3/8/2010   • Visual disturbances 1/30/2015         Past Surgical History:   Procedure Laterality Date   • PB RECONSTR TOTAL SHOULDER IMPLANT Left 12/31/2020    Procedure: ARTHROPLASTY, SHOULDER, TOTAL;  Surgeon: Ric Cooper M.D.;  Location: SURGERY [facility];  Service: Orthopedics   • BICEPS TENODESIS Left 12/31/2020    Procedure: TENODESIS, BICEPS;  Surgeon: Ric Cooper M.D.;  Location: [facility];  Service: Orthopedics   • KNEE ARTHROPLASTY TOTAL  9/15/2014    Performed by Cesar Abreu M.D. at [facility] ORS   • KNEE ARTHROPLASTY TOTAL  3/7/2011    right   • CARPAL TUNNEL ENDOSCOPIC  12/1/2009    Performed by RONNIE MAURICIO at [facility] ORS   • CERVICAL DISK AND FUSION ANTERIOR  1/26/2009    Performed by AMINA NERI at SURGERY [facility] ORS   • TOENAIL REMOVAL  2008    bilateral great toes   • ARTHROSCOPY, KNEE  1989    right   • HEMORRHOIDECTOMY  1980   • VASECTOMY  1979         Current Outpatient Medications   Medication Sig Dispense Refill   • omeprazole (PRILOSEC) 20 MG delayed-release capsule TAKE 1 CAPSULE BY MOUTH EVERY DAY 90 Capsule 3   • zonisamide (ZONEGRAN) 100 MG Cap      • atorvastatin (LIPITOR) 10 MG Tab TAKE 1 TABLET BY MOUTH EVERY DAY 90 Tablet 2   • SILODOSIN  PO Take  by mouth.     • clobetasol (TEMOVATE) 0.05 % Ointment APPLY TO AFFECTED AREA(S) 2 TIMES A DAY AS NEEDED. APPLY TO KNEES AS NEEDED 60 g 3   • Cholecalciferol ([employer]S VITAMIN D) 2000 UNIT Cap Take 1 Cap by mouth every bedtime. For vitamin D deficiency 100 Cap 3   • atorvastatin (LIPITOR) 10 MG Tab Take 10 mg by mouth every day.     • Cholecalciferol 2000 UNIT Tab Take 2,000 Units by mouth every day.     • omeprazole (PRILOSEC) 20 MG delayed-release capsule Take 20 mg by mouth every day.     • zonisamide (ZONEGRAN) 100 MG Cap Take 100 mg by mouth.       No current facility-administered medications for this visit.         Allergies   Allergen Reactions   • Erythromycin      Severe stomach pain   • Latex Rash     .   • Other Environmental      pollen         Family History   Problem Relation Age of Onset   • Cancer Mother    • Lung Cancer Mother    • Breast Cancer Daughter    • Lung Disease Other          Social History     Socioeconomic History   • Marital status:      Spouse name: Not on file   • Number of children: Not on file   • Years of education: Not on file   • Highest education level: Not on file   Occupational History   • Not on file   Tobacco Use   • Smoking status: Current Every Day Smoker     Packs/day: 1.00     Years: 43.00     Pack years: 43.00     Types: Cigarettes     Last attempt to quit: 2020     Years since quittin.8   • Smokeless tobacco: Never Used   Vaping Use   • Vaping Use: Never used   Substance and Sexual Activity   • Alcohol use: Not Currently     Alcohol/week: 0.0 oz     Comment: rare   • Drug use: No   • Sexual activity: Yes     Partners: Female     Birth control/protection: Post-Menopausal     Comment: , 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[employer]   Other Topics Concern   •  Service Not Asked   • Blood Transfusions Not Asked   • Caffeine Concern Not Asked   • Occupational Exposure Not Asked   • Hobby Hazards Not Asked   • Sleep Concern Not Asked   • Stress Concern Not  "Asked   • Weight Concern Not Asked   • Special Diet Not Asked   • Back Care Not Asked   • Exercise No   • Bike Helmet Not Asked   • Seat Belt Not Asked   • Self-Exams Not Asked   Social History Narrative    , 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[employer]     Social Determinants of Health     Financial Resource Strain: Not on file   Food Insecurity: Not on file   Transportation Needs: Not on file   Physical Activity: Not on file   Stress: Not on file   Social Connections: Not on file   Intimate Partner Violence: Not on file   Housing Stability: Not on file         Physical Exam:  Ambulatory Vitals  /74 (BP Location: Left arm, Patient Position: Sitting, BP Cuff Size: Adult)   Pulse 63   Resp 14   Ht 1.753 m (5' 9\")   Wt 87.1 kg (192 lb)   SpO2 98%    Oxygen Therapy:  Pulse Oximetry: 98 %  BP Readings from Last 4 Encounters:   06/23/22 124/74   05/24/22 122/68   03/14/22 110/56   11/08/21 124/82       Weight/BMI: Body mass index is 28.35 kg/m².  Wt Readings from Last 4 Encounters:   06/23/22 87.1 kg (192 lb)   05/24/22 88.5 kg (195 lb 1.7 oz)   03/14/22 92 kg (202 lb 13.2 oz)   11/08/21 96.3 kg (212 lb 4.9 oz)       General: No apparent distress  Eyes: nl conjunctiva  ENT: OP covered by mask.   Neck: JVP <8 cm H2O  Lungs: normal respiratory effort, CTAB  Heart: RRR, no murmurs, no rubs or gallops, no edema bilateral lower extremities. No LV/RV heave on cardiac palpatation. 2+ bilateral radial pulses.     Abdomen: soft, non tender, non distended, no masses, normal bowel sounds.  No HSM.  Extremities/MSK: no clubbing, no cyanosis  Neurological: No focal sensory deficits  Psychiatric: Appropriate affect, A/O x 3, intact judgement and insight  Skin: Warm extremities    Lab Data Review:  Lab Results   Component Value Date/Time    CHOLSTRLTOT 127 03/07/2022 07:22 AM    LDL 66 03/07/2022 07:22 AM    HDL 48 03/07/2022 07:22 AM    TRIGLYCERIDE 63 03/07/2022 07:22 AM       Lab Results   Component Value " Date/Time    SODIUM 144 2022 07:22 AM    POTASSIUM 4.2 2022 07:22 AM    CHLORIDE 108 2022 07:22 AM    CO2 25 2022 07:22 AM    GLUCOSE 98 2022 07:22 AM    BUN 12 2022 07:22 AM    CREATININE 0.77 2022 07:22 AM     Lab Results   Component Value Date/Time    ALKPHOSPHAT 69 2022 07:22 AM    ASTSGOT 19 2022 07:22 AM    ALTSGPT 12 2022 07:22 AM    TBILIRUBIN 0.6 2022 07:22 AM      Lab Results   Component Value Date/Time    WBC 6.8 2022 08:06 AM     No components found for: HBGA1C  No components found for: TROPONIN  No results found for: BNP      Cardiac Imaging and Procedures Review:    Echo dated 2021:   CONCLUSIONS  Left ventricular ejection fraction is visually estimated to be 65%.  Mildly dilated left atrium.  Mild aortic insufficiency.  Estimated right ventricular systolic pressure is 33 mmHg.     No prior study is available for comparison.    Radiology test Review:  Brain MRI 10/2021:     1. Age-related cerebral atrophy.     2. Mild periventricular white matter changes consistent with chronic microvascular ischemic gliosis.      Medical Decision Makin. Aortic atherosclerosis (HCC)  Continue statin    2. Essential hypertension, benign  Well controlled now with >80 pounds of weight loss just with diet changes and exercise.     3. Mixed hyperlipidemia  Continue statin    4. Prediabetes  Resolved with weight loss.     5. Thrombocytopenia (HCC)  Improving  -CTM    6. Pre-operative cardiovascular examination  Based on RCRI risk scoring, the patient is low risk of cardiovascular complications for low to moderate risk surgery or procedures.  he is medically optimized based on my last in person assessment and if he has no new symptoms, surgery should proceed without further cardiac work-up.      7. Mild aortic insufficiency  Recheck in 3 years.       Return in about 1 year (around 2023).      Devin Evans MD, [facility]  Heart and [facility]ascular [facility], [address]  [address] [place] [address], [address] [address]  Phone: [phone]  Fax: [phone]                    Connor Chen M.D.  [address] [place] [address] [place] [id number]  Via Fax: [phone]

## 2022-07-04 ENCOUNTER — OFFICE VISIT (OUTPATIENT)
Dept: URGENT CARE | Facility: PHYSICIAN GROUP | Age: 67
End: 2022-07-04
Payer: COMMERCIAL

## 2022-07-04 VITALS
WEIGHT: 184 LBS | HEART RATE: 86 BPM | BODY MASS INDEX: 27.25 KG/M2 | DIASTOLIC BLOOD PRESSURE: 72 MMHG | TEMPERATURE: 98.4 F | SYSTOLIC BLOOD PRESSURE: 122 MMHG | HEIGHT: 69 IN | OXYGEN SATURATION: 98 % | RESPIRATION RATE: 16 BRPM

## 2022-07-04 DIAGNOSIS — U07.1 COVID-19: ICD-10-CM

## 2022-07-04 LAB
EXTERNAL QUALITY CONTROL: ABNORMAL
SARS-COV+SARS-COV-2 AG RESP QL IA.RAPID: POSITIVE

## 2022-07-04 PROCEDURE — 99213 OFFICE O/P EST LOW 20 MIN: CPT | Mod: CS | Performed by: PHYSICIAN ASSISTANT

## 2022-07-04 PROCEDURE — 87426 SARSCOV CORONAVIRUS AG IA: CPT | Performed by: PHYSICIAN ASSISTANT

## 2022-07-04 ASSESSMENT — ENCOUNTER SYMPTOMS
NAUSEA: 0
CONSTIPATION: 0
HEADACHES: 0
DIARRHEA: 0
MYALGIAS: 0
VOMITING: 0
COUGH: 0
SORE THROAT: 1
ABDOMINAL PAIN: 0
FEVER: 0
CHILLS: 0
SHORTNESS OF BREATH: 0
EYE PAIN: 0

## 2022-07-04 ASSESSMENT — FIBROSIS 4 INDEX: FIB4 SCORE: 2.2

## 2022-07-04 NOTE — PROGRESS NOTES
"Subjective:   Mazin Wing is a 66 y.o. male who presents for Other (Wife tested positive for Covid today, he is having surgery on the 11th)      Pleasant 66-year-old male presents to urgent care after 2 household members tested positive for COVID earlier today.  He has not noted any specific symptoms however has noted more congestion and scratchy throat that he attributes to his allergies but has been persistent and unchanged for several weeks.  He has not noticed any shortness of breath, fevers, or malaise.  In fact he was out working in his yard and had normal energy level today.  He is concerned as he is scheduled to have a total hip replacement on 7/11.  He is vaccinated against COVID.      Review of Systems   Constitutional: Negative for chills and fever.   HENT: Positive for congestion and sore throat. Negative for ear pain.    Eyes: Negative for pain.   Respiratory: Negative for cough and shortness of breath.    Cardiovascular: Negative for chest pain.   Gastrointestinal: Negative for abdominal pain, constipation, diarrhea, nausea and vomiting.   Genitourinary: Negative for dysuria.   Musculoskeletal: Negative for myalgias.   Skin: Negative for rash.   Neurological: Negative for headaches.       Medications, Allergies, and current problem list reviewed today in Epic.     Objective:     /72   Pulse 86   Temp 36.9 °C (98.4 °F) (Temporal)   Resp 16   Ht 1.753 m (5' 9\")   Wt 83.5 kg (184 lb)   SpO2 98%     Physical Exam  Vitals reviewed.   Constitutional:       Appearance: Normal appearance. He is not ill-appearing or toxic-appearing.   HENT:      Head: Normocephalic and atraumatic.      Right Ear: External ear normal.      Left Ear: External ear normal.      Nose: Nose normal.      Mouth/Throat:      Mouth: Mucous membranes are moist.   Eyes:      Conjunctiva/sclera: Conjunctivae normal.   Cardiovascular:      Rate and Rhythm: Normal rate and regular rhythm.   Pulmonary:      Effort: Pulmonary " effort is normal.      Breath sounds: Normal breath sounds.   Skin:     General: Skin is warm and dry.      Capillary Refill: Capillary refill takes less than 2 seconds.   Neurological:      Mental Status: He is alert and oriented to person, place, and time.         Assessment/Plan:     Diagnosis and associated orders:     1. COVID-19  POCT SARS-COV Antigen JUSTIN (Symptomatic only)      Comments/MDM:     • COVID-positive however patient with mild or absent symptoms, discussed antiviral therapy and we both agree that he will monitor symptoms.  Recommend he follow-up with his surgeon.  No signs of hypoxia or endorgan injury.  Discussed isolation, work note provided.  Discussed ER precautions and supportive care.         Differential diagnosis, natural history, supportive care, and indications for immediate follow-up discussed.    Advised the patient to follow-up with the primary care physician for recheck, reevaluation, and consideration of further management.    Please note that this dictation was created using voice recognition software. I have made a reasonable attempt to correct obvious errors, but I expect that there are errors of grammar and possibly content that I did not discover before finalizing the note.    This note was electronically signed by Yo Russo PA-C

## 2022-07-04 NOTE — LETTER
July 4, 2022    To Whom It May Concern:         This is confirmation that Mazin Wing attended his scheduled appointment with Yo Russo P.A.-C. on 7/04/22.  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    Since the results of testing are positive, your employee should follow the CDC guidelines.  These are isolation for a minimum of 5 days and at least 24 hours have passed since your last fever without the use of fever-reducing medications and all other symptoms have improved.  After completing the isolation the patient must continue to use a well fitting mask for an additional 5 days.  The health department may contact you and provide further directions regarding self-isolation and return to work.     This is the only note that will be provided from [facility] for this visit.  Your employee will require an appointment with a primary care provider if FMLA or disability forms are required.  If you have any questions please do not hesitate to call me at the phone number listed below.    Sincerely,    Yo Russo P.A.-C.  [phone]    1

## 2022-08-02 ENCOUNTER — HOSPITAL ENCOUNTER (OUTPATIENT)
Dept: LAB | Facility: MEDICAL CENTER | Age: 67
End: 2022-08-02
Attending: UROLOGY
Payer: COMMERCIAL

## 2022-08-02 LAB — PSA SERPL-MCNC: 3.52 NG/ML (ref 0–4)

## 2022-08-02 PROCEDURE — 36415 COLL VENOUS BLD VENIPUNCTURE: CPT

## 2022-08-02 PROCEDURE — 84153 ASSAY OF PSA TOTAL: CPT

## 2022-09-17 NOTE — TELEPHONE ENCOUNTER
Patient informed that she can get the covid booster after 10/9 per Dr. Appiah.   Was the patient seen in the last year in this department? Yes     Does patient have an active prescription for medications requested? No     Received Request Via: Pharmacy

## 2023-01-13 ENCOUNTER — OFFICE VISIT (OUTPATIENT)
Dept: MEDICAL GROUP | Facility: MEDICAL CENTER | Age: 68
End: 2023-01-13
Payer: MEDICARE

## 2023-01-13 VITALS
RESPIRATION RATE: 16 BRPM | HEIGHT: 69 IN | HEART RATE: 65 BPM | SYSTOLIC BLOOD PRESSURE: 122 MMHG | OXYGEN SATURATION: 98 % | WEIGHT: 196.21 LBS | DIASTOLIC BLOOD PRESSURE: 60 MMHG | TEMPERATURE: 97.8 F | BODY MASS INDEX: 29.06 KG/M2

## 2023-01-13 DIAGNOSIS — R73.03 PREDIABETES: ICD-10-CM

## 2023-01-13 DIAGNOSIS — Z12.2 SCREENING FOR LUNG CANCER: ICD-10-CM

## 2023-01-13 DIAGNOSIS — Z23 NEED FOR VACCINATION: ICD-10-CM

## 2023-01-13 DIAGNOSIS — F17.210 NICOTINE DEPENDENCE, CIGARETTES, UNCOMPLICATED: ICD-10-CM

## 2023-01-13 DIAGNOSIS — E55.9 VITAMIN D DEFICIENCY: Chronic | ICD-10-CM

## 2023-01-13 DIAGNOSIS — Z12.5 SCREENING FOR PROSTATE CANCER: ICD-10-CM

## 2023-01-13 DIAGNOSIS — E78.2 MIXED HYPERLIPIDEMIA: ICD-10-CM

## 2023-01-13 DIAGNOSIS — Z12.11 SCREENING FOR COLORECTAL CANCER: ICD-10-CM

## 2023-01-13 DIAGNOSIS — Z12.12 SCREENING FOR COLORECTAL CANCER: ICD-10-CM

## 2023-01-13 DIAGNOSIS — L98.9 SKIN LESION: ICD-10-CM

## 2023-01-13 DIAGNOSIS — D69.6 THROMBOCYTOPENIA (HCC): ICD-10-CM

## 2023-01-13 PROBLEM — M67.449 DIGITAL MUCINOUS CYST: Status: RESOLVED | Noted: 2020-01-20 | Resolved: 2023-01-13

## 2023-01-13 PROBLEM — M65.331 TRIGGER MIDDLE FINGER OF RIGHT HAND: Status: RESOLVED | Noted: 2020-09-14 | Resolved: 2023-01-13

## 2023-01-13 PROBLEM — L81.9 ATYPICAL PIGMENTED SKIN LESION: Status: RESOLVED | Noted: 2019-07-30 | Resolved: 2023-01-13

## 2023-01-13 PROBLEM — Z01.818 PRE-OP EVALUATION: Status: RESOLVED | Noted: 2022-05-24 | Resolved: 2023-01-13

## 2023-01-13 PROCEDURE — 90677 PCV20 VACCINE IM: CPT | Performed by: FAMILY MEDICINE

## 2023-01-13 PROCEDURE — G0009 ADMIN PNEUMOCOCCAL VACCINE: HCPCS | Performed by: FAMILY MEDICINE

## 2023-01-13 PROCEDURE — 99214 OFFICE O/P EST MOD 30 MIN: CPT | Mod: 25 | Performed by: FAMILY MEDICINE

## 2023-01-13 ASSESSMENT — ENCOUNTER SYMPTOMS
WHEEZING: 0
FEVER: 0
PALPITATIONS: 0
COUGH: 0
CHILLS: 0
SHORTNESS OF BREATH: 0

## 2023-01-13 ASSESSMENT — PATIENT HEALTH QUESTIONNAIRE - PHQ9: CLINICAL INTERPRETATION OF PHQ2 SCORE: 0

## 2023-01-13 ASSESSMENT — FIBROSIS 4 INDEX: FIB4 SCORE: 2.23

## 2023-01-13 NOTE — PROGRESS NOTES
FAMILY MEDICINE VISIT                                                               Chief complaint::Diagnoses of Prediabetes, Vitamin D deficiency, Thrombocytopenia (HCC), Mixed hyperlipidemia, Screening for colorectal cancer, Nicotine dependence, uncomplicated (Current Smoker), Screening for lung cancer, Need for vaccination, Skin lesion, and Screening for prostate cancer were pertinent to this visit.    History of present illness: Mazin Wing is a 67 y.o. male who presented for skin lesion, labs.    Problem   Skin Lesion    He noticed skin lesion under his left eye few months ago.  Sometimes he has itching and whenever he scratches this area, it becomes more irritated, red.     Nicotine dependence, uncomplicated (Current Smoker)    He smokes about half a pack of cigarettes a day, he has been smoking for 40 years.  His last CT lung cancer screening was in 2016 and that that showed pulmonary nodules.     Thrombocytopenia (Hcc)    Chronic condition. Most recent platelet count 125 (05/2022). This is felt to be secondary to Depakote which he discontinued for about 2-3 months now. He denies regular bleeding episodes.    Previously his platelet count has also been on lower end.  He is on Depakote and currently following with neurology Dr. Looney for seizures and memory problems.  Other counts are in normal range.  No bleeding or bruising episodes.     Vitamin D Deficiency    Previous history of vitamin D deficiency.  Currently take vitamin D 2000 international units daily.  Last vitamin D level was checked 5 years ago and it was 35     Prediabetes    He is working on weight loss.  He is eating more healthy diet and has been more physically active.  Reports that he has been doing a lot of hikes as he got retired now.    Lab Results   Component Value Date/Time    HBA1C 5.6 03/07/2022 07:22 AM         Mixed Hyperlipidemia      Recent lipid panel showed cholesterol levels in normal range.  He is on atorvastatin 10 mg  "daily.  Doing well with this medication.  Lab Results   Component Value Date/Time    CHOLSTRLTOT 127 03/07/2022 0722    TRIGLYCERIDE 63 03/07/2022 0722    HDL 48 03/07/2022 0722    LDL 66 03/07/2022 0722          Review of systems:     Review of Systems   Constitutional:  Negative for chills, fever and malaise/fatigue.   Respiratory:  Negative for cough, shortness of breath and wheezing.    Cardiovascular:  Negative for chest pain, palpitations and leg swelling.   Skin:         Skin lesion under left eye      Medications and Allergies:     Current Outpatient Medications   Medication Sig Dispense Refill    atorvastatin (LIPITOR) 10 MG Tab Take 1 Tablet by mouth every day. 90 Tablet 3    omeprazole (PRILOSEC) 20 MG delayed-release capsule TAKE 1 CAPSULE BY MOUTH EVERY DAY 90 Capsule 3    zonisamide (ZONEGRAN) 100 MG Cap       SILODOSIN PO Take  by mouth.      clobetasol (TEMOVATE) 0.05 % Ointment APPLY TO AFFECTED AREA(S) 2 TIMES A DAY AS NEEDED. APPLY TO KNEES AS NEEDED 60 g 3    Cholecalciferol (CVS VITAMIN D) 2000 UNIT Cap Take 1 Cap by mouth every bedtime. For vitamin D deficiency 100 Cap 3     No current facility-administered medications for this visit.          Vitals:    /60 (BP Location: Left arm, Patient Position: Sitting, BP Cuff Size: Adult)   Pulse 65   Temp 36.6 °C (97.8 °F)   Resp 16   Ht 1.753 m (5' 9\")   Wt 89 kg (196 lb 3.4 oz)   SpO2 98%  Body mass index is 28.98 kg/m².    Physical Exam:     Physical Exam  Constitutional:       General: He is not in acute distress.     Appearance: Normal appearance. He is not ill-appearing.   HENT:      Head: Normocephalic and atraumatic.      Right Ear: External ear normal.      Left Ear: External ear normal.   Eyes:      Conjunctiva/sclera: Conjunctivae normal.   Cardiovascular:      Rate and Rhythm: Normal rate.   Pulmonary:      Effort: Pulmonary effort is normal. No respiratory distress.   Musculoskeletal:      Cervical back: Neck supple.   Skin:     " Comments: Nonerythematous papular lesion present just below under left eye   Neurological:      Mental Status: He is alert.      Gait: Gait is intact.   Psychiatric:         Mood and Affect: Mood and affect normal.         Judgment: Judgment normal.          Assessment/Plan:         Problem List Items Addressed This Visit       Vitamin D deficiency (Chronic)     Chronic problem, stable, recheck vitamin D level to assess control.  Continue vitamin D 2000 international units daily         Relevant Orders    VITAMIN D,25 HYDROXY (DEFICIENCY)    Thrombocytopenia (HCC)     Chronic problem, stable, recheck CBC to check for platelet count         Relevant Orders    CBC WITH DIFFERENTIAL    Skin lesion     New problem, unstable, appears wart, this lesion is close to his left eye, referral to dermatology for further evaluation.         Relevant Orders    Referral to Dermatology    Prediabetes     Chronic problem, stable, continue to eat healthy diet and exercise.  Recheck labs to assess control.         Relevant Orders    HEMOGLOBIN A1C    Nicotine dependence, uncomplicated (Current Smoker)     Chronic problem, unstable, counseled regarding quitting smoking.  Patient is willing to quit smoking.  He is going to cut back down on its own.  Ordered CT lung cancer screening         Relevant Orders    CT-LUNG CANCER-SCREENING    Mixed hyperlipidemia     Chronic problem, stable, recheck lipid panel to assess control, continue Lipitor 10 mg daily.         Relevant Orders    Comp Metabolic Panel    Lipid Profile     Other Visit Diagnoses       Screening for colorectal cancer        Relevant Orders    Referral to GI for Colonoscopy    Screening for lung cancer        Relevant Orders    CT-LUNG CANCER-SCREENING    Need for vaccination        Relevant Orders    Pneumococcal Conjugate Vaccine 20-Valent (19 yrs+)    Screening for prostate cancer        Relevant Orders    PROSTATE SPECIFIC AG SCREENING           Will order aortic aneurysm  screening at next visit.    Please note that this dictation was created using voice recognition software. I have made every reasonable attempt to correct obvious errors, but I expect that there are errors of grammar and possibly content that I did not discover before finalizing the note.    Follow up in 3 months for lab follow-up.

## 2023-01-13 NOTE — ASSESSMENT & PLAN NOTE
Chronic problem, unstable, counseled regarding quitting smoking.  Patient is willing to quit smoking.  He is going to cut back down on its own.  Ordered CT lung cancer screening

## 2023-01-13 NOTE — ASSESSMENT & PLAN NOTE
New problem, unstable, appears wart, this lesion is close to his left eye, referral to dermatology for further evaluation.

## 2023-01-13 NOTE — ASSESSMENT & PLAN NOTE
Chronic problem, stable, continue to eat healthy diet and exercise.  Recheck labs to assess control.

## 2023-01-13 NOTE — ASSESSMENT & PLAN NOTE
Chronic problem, stable, recheck vitamin D level to assess control.  Continue vitamin D 2000 international units daily

## 2023-01-21 ENCOUNTER — HOSPITAL ENCOUNTER (OUTPATIENT)
Dept: RADIOLOGY | Facility: MEDICAL CENTER | Age: 68
End: 2023-01-21
Attending: FAMILY MEDICINE
Payer: MEDICARE

## 2023-01-21 DIAGNOSIS — F17.210 NICOTINE DEPENDENCE, CIGARETTES, UNCOMPLICATED: ICD-10-CM

## 2023-01-21 DIAGNOSIS — Z12.2 SCREENING FOR LUNG CANCER: ICD-10-CM

## 2023-01-21 PROCEDURE — 71271 CT THORAX LUNG CANCER SCR C-: CPT

## 2023-02-17 ENCOUNTER — OFFICE VISIT (OUTPATIENT)
Dept: MEDICAL GROUP | Facility: MEDICAL CENTER | Age: 68
End: 2023-02-17
Payer: MEDICARE

## 2023-02-17 VITALS
SYSTOLIC BLOOD PRESSURE: 124 MMHG | TEMPERATURE: 98.8 F | HEIGHT: 69 IN | RESPIRATION RATE: 16 BRPM | OXYGEN SATURATION: 98 % | WEIGHT: 191.8 LBS | DIASTOLIC BLOOD PRESSURE: 64 MMHG | BODY MASS INDEX: 28.41 KG/M2 | HEART RATE: 60 BPM

## 2023-02-17 DIAGNOSIS — G47.09 OTHER INSOMNIA: ICD-10-CM

## 2023-02-17 DIAGNOSIS — F32.1 CURRENT MODERATE EPISODE OF MAJOR DEPRESSIVE DISORDER WITHOUT PRIOR EPISODE (HCC): ICD-10-CM

## 2023-02-17 PROCEDURE — 99214 OFFICE O/P EST MOD 30 MIN: CPT | Performed by: FAMILY MEDICINE

## 2023-02-17 RX ORDER — TRAZODONE HYDROCHLORIDE 50 MG/1
50 TABLET ORAL NIGHTLY
Qty: 60 TABLET | Refills: 1 | Status: SHIPPED | OUTPATIENT
Start: 2023-02-17 | End: 2023-04-10

## 2023-02-17 ASSESSMENT — ENCOUNTER SYMPTOMS
INSOMNIA: 1
PALPITATIONS: 0
DEPRESSION: 1
NERVOUS/ANXIOUS: 0
FEVER: 0
CHILLS: 0

## 2023-02-17 ASSESSMENT — PATIENT HEALTH QUESTIONNAIRE - PHQ9
SUM OF ALL RESPONSES TO PHQ9 QUESTIONS 1 AND 2: 6
2. FEELING DOWN, DEPRESSED, IRRITABLE, OR HOPELESS: NEARLY EVERY DAY
6. FEELING BAD ABOUT YOURSELF - OR THAT YOU ARE A FAILURE OR HAVE LET YOURSELF OR YOUR FAMILY DOWN: MORE THAN HALF THE DAYS
5. POOR APPETITE OR OVEREATING: MORE THAN HALF THE DAYS
3. TROUBLE FALLING OR STAYING ASLEEP OR SLEEPING TOO MUCH: NEARLY EVERY DAY
8. MOVING OR SPEAKING SO SLOWLY THAT OTHER PEOPLE COULD HAVE NOTICED. OR THE OPPOSITE, BEING SO FIGETY OR RESTLESS THAT YOU HAVE BEEN MOVING AROUND A LOT MORE THAN USUAL: NOT AT ALL
1. LITTLE INTEREST OR PLEASURE IN DOING THINGS: NEARLY EVERY DAY
SUM OF ALL RESPONSES TO PHQ QUESTIONS 1-9: 18
9. THOUGHTS THAT YOU WOULD BE BETTER OFF DEAD, OR OF HURTING YOURSELF: SEVERAL DAYS
4. FEELING TIRED OR HAVING LITTLE ENERGY: MORE THAN HALF THE DAYS
7. TROUBLE CONCENTRATING ON THINGS, SUCH AS READING THE NEWSPAPER OR WATCHING TELEVISION: MORE THAN HALF THE DAYS

## 2023-02-17 ASSESSMENT — FIBROSIS 4 INDEX: FIB4 SCORE: 2.23

## 2023-02-17 NOTE — PROGRESS NOTES
FAMILY MEDICINE VISIT                                                               Chief complaint::Diagnoses of Current moderate episode of major depressive disorder without prior episode (HCC) and Other insomnia were pertinent to this visit.    History of present illness: Mazin Wing is a 67 y.o. male who presented for depression and sleep problems.    He reports that he has depression symptoms that has been going on for a long time.  He recently got  from his life.  He reports that he had head injury and he was 10 years old and after that everything changed, he never cared about anything.  He was in .  He has never done therapy.  His son has similar symptoms and he is doing therapy and is doing better.  He would like to start therapy.  He reports that he is not eating well, not sleeping well, would like to try medication also.  Have sometimes passive suicidal thoughts.  Denies any active suicidal thoughts.    Depression Screening    Little interest or pleasure in doing things?   Nearly every day  Feeling down, depressed , or hopeless?  Nearly every day  Trouble falling or staying asleep, or sleeping too much?   Nearly every day  Feeling tired or having little energy?   More than half the days  Poor appetite or overeating?   More than half the days  Feeling bad about yourself - or that you are a failure or have let yourself or your family down?  More than half the days  Trouble concentrating on things, such as reading the newspaper or watching television?  More than half the days  Moving or speaking so slowly that other people could have noticed.  Or the opposite - being so fidgety or restless that you have been moving around a lot more than usual?   Not at all  Thoughts that you would be better off dead, or of hurting yourself?   Several days  Patient Health Questionnaire Score:  (!) 18      If depressive symptoms identified deferred to follow up visit unless specifically addressed in  "assesment and plan.    Interpretation of PHQ-9 Total Score   Score Severity   1-4 No Depression   5-9 Mild Depression   10-14 Moderate Depression   15-19 Moderately Severe Depression   20-27 Severe Depression       Review of systems:     Review of Systems   Constitutional:  Negative for chills and fever.        Loss of appetite   Cardiovascular:  Negative for chest pain, palpitations and leg swelling.   Psychiatric/Behavioral:  Positive for depression. The patient has insomnia. The patient is not nervous/anxious.       Medications and Allergies:     Current Outpatient Medications   Medication Sig Dispense Refill    traZODone (DESYREL) 50 MG Tab Take 1 Tablet by mouth every evening. 60 Tablet 1    atorvastatin (LIPITOR) 10 MG Tab Take 1 Tablet by mouth every day. 90 Tablet 3    omeprazole (PRILOSEC) 20 MG delayed-release capsule TAKE 1 CAPSULE BY MOUTH EVERY DAY 90 Capsule 3    zonisamide (ZONEGRAN) 100 MG Cap       SILODOSIN PO Take  by mouth.      clobetasol (TEMOVATE) 0.05 % Ointment APPLY TO AFFECTED AREA(S) 2 TIMES A DAY AS NEEDED. APPLY TO KNEES AS NEEDED 60 g 3    Cholecalciferol ([employer] VITAMIN D) 2000 UNIT Cap Take 1 Cap by mouth every bedtime. For vitamin D deficiency 100 Cap 3     No current facility-administered medications for this visit.          Vitals:    /64   Pulse 60   Temp 37.1 °C (98.8 °F)   Resp 16   Ht 1.753 m (5' 9\")   Wt 87 kg (191 lb 12.8 oz)   SpO2 98%  Body mass index is 28.32 kg/m².    Physical Exam:     Physical Exam  Constitutional:       Appearance: Normal appearance. He is well-developed and well-groomed.   HENT:      Head: Normocephalic and atraumatic.      Right Ear: External ear normal.      Left Ear: External ear normal.   Eyes:      General:         Right eye: No discharge.         Left eye: No discharge.      Conjunctiva/sclera: Conjunctivae normal.   Cardiovascular:      Rate and Rhythm: Normal rate.   Pulmonary:      Effort: Pulmonary effort is normal. No respiratory " distress.   Musculoskeletal:      Cervical back: Neck supple.   Skin:     Findings: No rash.   Neurological:      Mental Status: He is alert.   Psychiatric:         Mood and Affect: Mood is depressed. Affect is tearful.        Assessment/Plan:         1. Current moderate episode of major depressive disorder without prior episode (HCC)  New problem, unstable, start trazodone 50 mg at bedtime.  Referral to behavioral health to see therapist.    - traZODone (DESYREL) 50 MG Tab; Take 1 Tablet by mouth every evening.  Dispense: 60 Tablet; Refill: 1  - Referral to Behavioral Health    2. Other insomnia  New problem, unstable, start trazodone 50 mg at bedtime.  Referral to behavioral health to see therapist.    - traZODone (DESYREL) 50 MG Tab; Take 1 Tablet by mouth every evening.  Dispense: 60 Tablet; Refill: 1  - Referral to Behavioral Health     Please note that this dictation was created using voice recognition software. I have made every reasonable attempt to correct obvious errors, but I expect that there are errors of grammar and possibly content that I did not discover before finalizing the note.    Follow up in 2 months for medication follow-up.

## 2023-03-08 DIAGNOSIS — L98.499 ULCER OF SKIN OF BREAST (HCC): ICD-10-CM

## 2023-04-10 DIAGNOSIS — G47.09 OTHER INSOMNIA: ICD-10-CM

## 2023-04-10 DIAGNOSIS — F32.1 CURRENT MODERATE EPISODE OF MAJOR DEPRESSIVE DISORDER WITHOUT PRIOR EPISODE (HCC): ICD-10-CM

## 2023-04-10 RX ORDER — TRAZODONE HYDROCHLORIDE 50 MG/1
TABLET ORAL
Qty: 60 TABLET | Refills: 1 | Status: SHIPPED | OUTPATIENT
Start: 2023-04-10 | End: 2023-04-14

## 2023-04-13 ENCOUNTER — HOSPITAL ENCOUNTER (OUTPATIENT)
Dept: LAB | Facility: MEDICAL CENTER | Age: 68
End: 2023-04-13
Attending: FAMILY MEDICINE
Payer: MEDICARE

## 2023-04-13 DIAGNOSIS — R73.03 PREDIABETES: ICD-10-CM

## 2023-04-13 DIAGNOSIS — D69.6 THROMBOCYTOPENIA (HCC): ICD-10-CM

## 2023-04-13 DIAGNOSIS — E78.2 MIXED HYPERLIPIDEMIA: ICD-10-CM

## 2023-04-13 DIAGNOSIS — Z12.5 SCREENING FOR PROSTATE CANCER: ICD-10-CM

## 2023-04-13 DIAGNOSIS — E55.9 VITAMIN D DEFICIENCY: Chronic | ICD-10-CM

## 2023-04-13 LAB
ALBUMIN SERPL BCP-MCNC: 4 G/DL (ref 3.2–4.9)
ALBUMIN/GLOB SERPL: 1.5 G/DL
ALP SERPL-CCNC: 84 U/L (ref 30–99)
ALT SERPL-CCNC: 16 U/L (ref 2–50)
ANION GAP SERPL CALC-SCNC: 9 MMOL/L (ref 7–16)
AST SERPL-CCNC: 18 U/L (ref 12–45)
BASOPHILS # BLD AUTO: 0.7 % (ref 0–1.8)
BASOPHILS # BLD: 0.05 K/UL (ref 0–0.12)
BILIRUB SERPL-MCNC: 0.5 MG/DL (ref 0.1–1.5)
BUN SERPL-MCNC: 11 MG/DL (ref 8–22)
CALCIUM ALBUM COR SERPL-MCNC: 9 MG/DL (ref 8.5–10.5)
CALCIUM SERPL-MCNC: 9 MG/DL (ref 8.5–10.5)
CHLORIDE SERPL-SCNC: 108 MMOL/L (ref 96–112)
CHOLEST SERPL-MCNC: 109 MG/DL (ref 100–199)
CO2 SERPL-SCNC: 25 MMOL/L (ref 20–33)
CREAT SERPL-MCNC: 0.91 MG/DL (ref 0.5–1.4)
EOSINOPHIL # BLD AUTO: 0.3 K/UL (ref 0–0.51)
EOSINOPHIL NFR BLD: 4.2 % (ref 0–6.9)
ERYTHROCYTE [DISTWIDTH] IN BLOOD BY AUTOMATED COUNT: 46.7 FL (ref 35.9–50)
EST. AVERAGE GLUCOSE BLD GHB EST-MCNC: 111 MG/DL
FASTING STATUS PATIENT QL REPORTED: NORMAL
GFR SERPLBLD CREATININE-BSD FMLA CKD-EPI: 92 ML/MIN/1.73 M 2
GLOBULIN SER CALC-MCNC: 2.6 G/DL (ref 1.9–3.5)
GLUCOSE SERPL-MCNC: 95 MG/DL (ref 65–99)
HBA1C MFR BLD: 5.5 % (ref 4–5.6)
HCT VFR BLD AUTO: 44.5 % (ref 42–52)
HDLC SERPL-MCNC: 43 MG/DL
HGB BLD-MCNC: 14.7 G/DL (ref 14–18)
IMM GRANULOCYTES # BLD AUTO: 0.02 K/UL (ref 0–0.11)
IMM GRANULOCYTES NFR BLD AUTO: 0.3 % (ref 0–0.9)
LDLC SERPL CALC-MCNC: 55 MG/DL
LYMPHOCYTES # BLD AUTO: 2.16 K/UL (ref 1–4.8)
LYMPHOCYTES NFR BLD: 30.2 % (ref 22–41)
MCH RBC QN AUTO: 31.1 PG (ref 27–33)
MCHC RBC AUTO-ENTMCNC: 33 G/DL (ref 33.7–35.3)
MCV RBC AUTO: 94.1 FL (ref 81.4–97.8)
MONOCYTES # BLD AUTO: 0.43 K/UL (ref 0–0.85)
MONOCYTES NFR BLD AUTO: 6 % (ref 0–13.4)
NEUTROPHILS # BLD AUTO: 4.19 K/UL (ref 1.82–7.42)
NEUTROPHILS NFR BLD: 58.6 % (ref 44–72)
NRBC # BLD AUTO: 0 K/UL
NRBC BLD-RTO: 0 /100 WBC
PLATELET # BLD AUTO: 101 K/UL (ref 164–446)
PMV BLD AUTO: 12.7 FL (ref 9–12.9)
POTASSIUM SERPL-SCNC: 4 MMOL/L (ref 3.6–5.5)
PROT SERPL-MCNC: 6.6 G/DL (ref 6–8.2)
RBC # BLD AUTO: 4.73 M/UL (ref 4.7–6.1)
SODIUM SERPL-SCNC: 142 MMOL/L (ref 135–145)
TRIGL SERPL-MCNC: 54 MG/DL (ref 0–149)
WBC # BLD AUTO: 7.2 K/UL (ref 4.8–10.8)

## 2023-04-13 PROCEDURE — 82306 VITAMIN D 25 HYDROXY: CPT

## 2023-04-13 PROCEDURE — 84153 ASSAY OF PSA TOTAL: CPT | Mod: GA

## 2023-04-13 PROCEDURE — 80061 LIPID PANEL: CPT

## 2023-04-13 PROCEDURE — 36415 COLL VENOUS BLD VENIPUNCTURE: CPT | Mod: GA

## 2023-04-13 PROCEDURE — 80053 COMPREHEN METABOLIC PANEL: CPT

## 2023-04-13 PROCEDURE — 85025 COMPLETE CBC W/AUTO DIFF WBC: CPT

## 2023-04-13 PROCEDURE — 83036 HEMOGLOBIN GLYCOSYLATED A1C: CPT | Mod: GA

## 2023-04-14 ENCOUNTER — OFFICE VISIT (OUTPATIENT)
Dept: MEDICAL GROUP | Facility: MEDICAL CENTER | Age: 68
End: 2023-04-14
Payer: MEDICARE

## 2023-04-14 VITALS
WEIGHT: 182.98 LBS | HEART RATE: 60 BPM | OXYGEN SATURATION: 98 % | HEIGHT: 69 IN | TEMPERATURE: 98.5 F | DIASTOLIC BLOOD PRESSURE: 54 MMHG | RESPIRATION RATE: 16 BRPM | SYSTOLIC BLOOD PRESSURE: 110 MMHG | BODY MASS INDEX: 27.1 KG/M2

## 2023-04-14 DIAGNOSIS — G47.09 OTHER INSOMNIA: ICD-10-CM

## 2023-04-14 DIAGNOSIS — Z13.6 SCREENING FOR AAA (ABDOMINAL AORTIC ANEURYSM): ICD-10-CM

## 2023-04-14 DIAGNOSIS — D69.6 THROMBOCYTOPENIA (HCC): ICD-10-CM

## 2023-04-14 DIAGNOSIS — I70.0 AORTIC ATHEROSCLEROSIS (HCC): ICD-10-CM

## 2023-04-14 LAB
25(OH)D3 SERPL-MCNC: 49 NG/ML (ref 30–100)
PSA SERPL-MCNC: 3.23 NG/ML (ref 0–4)

## 2023-04-14 PROCEDURE — 99214 OFFICE O/P EST MOD 30 MIN: CPT | Performed by: FAMILY MEDICINE

## 2023-04-14 RX ORDER — TRAZODONE HYDROCHLORIDE 100 MG/1
100 TABLET ORAL NIGHTLY
Qty: 90 TABLET | Refills: 3 | Status: SHIPPED | OUTPATIENT
Start: 2023-04-14 | End: 2023-07-13 | Stop reason: SDUPTHER

## 2023-04-14 ASSESSMENT — ENCOUNTER SYMPTOMS
PALPITATIONS: 0
CHILLS: 0
INSOMNIA: 1
FEVER: 0

## 2023-04-14 ASSESSMENT — FIBROSIS 4 INDEX: FIB4 SCORE: 2.99

## 2023-04-14 NOTE — ASSESSMENT & PLAN NOTE
Chronic problem, stable, currently asymptomatic, monitor labs closely.  Recheck CBC with next blood work in 3 months

## 2023-04-14 NOTE — PROGRESS NOTES
FAMILY MEDICINE VISIT                                                               Chief complaint::Diagnoses of Thrombocytopenia (HCC), Other insomnia, Aortic atherosclerosis (HCC), and Screening for AAA (abdominal aortic aneurysm) were pertinent to this visit.    History of present illness: Mazin Wing is a 67 y.o. male who presented for lab and medication follow-up.    Problem   Other Insomnia    At last visit we prescribed him trazodone 50 mg daily.  He reports that he is able to sleep but still has a lot of symptoms.  He is going to follow-up with the behavioral health for therapy.  No major side effects with trazodone     Aortic Atherosclerosis (Hcc)    Recent CT showed atherosclerosis.  He is on Lipitor 10 mg daily.  Recent cholesterol panel came back normal.  Platelet count came back mildly low.  Is not taking aspirin.     Thrombocytopenia (Hcc)    . This is felt to be secondary to Depakote which he discontinued.  He is currently on zonisamide which also causes agranulocytosis.  Recent platelet count came back at 101 which reduced from 125.  No bleeding or bruising          Review of systems:     Review of Systems   Constitutional:  Negative for chills and fever.   Cardiovascular:  Negative for chest pain, palpitations and leg swelling.   Psychiatric/Behavioral:  The patient has insomnia.       Medications and Allergies:     Current Outpatient Medications   Medication Sig Dispense Refill    traZODone (DESYREL) 100 MG Tab Take 1 Tablet by mouth every evening. 90 Tablet 3    mupirocin (BACTROBAN) 2 % Ointment Apply 1 Application topically 3 times a day. 22 g 0    atorvastatin (LIPITOR) 10 MG Tab Take 1 Tablet by mouth every day. 90 Tablet 3    omeprazole (PRILOSEC) 20 MG delayed-release capsule TAKE 1 CAPSULE BY MOUTH EVERY DAY 90 Capsule 3    zonisamide (ZONEGRAN) 100 MG Cap       SILODOSIN PO Take  by mouth.      clobetasol (TEMOVATE) 0.05 % Ointment APPLY TO AFFECTED AREA(S) 2 TIMES A DAY AS NEEDED.  "APPLY TO KNEES AS NEEDED 60 g 3    Cholecalciferol ([employer]S VITAMIN D) 2000 UNIT Cap Take 1 Cap by mouth every bedtime. For vitamin D deficiency 100 Cap 3     No current facility-administered medications for this visit.          Vitals:    /54   Pulse 60   Temp 36.9 °C (98.5 °F)   Resp 16   Ht 1.753 m (5' 9\")   Wt 83 kg (182 lb 15.7 oz)   SpO2 98%  Body mass index is 27.02 kg/m².    Physical Exam:     Physical Exam  Constitutional:       Appearance: Normal appearance. He is well-developed and well-groomed.   HENT:      Head: Normocephalic and atraumatic.      Right Ear: External ear normal.      Left Ear: External ear normal.   Eyes:      General:         Right eye: No discharge.         Left eye: No discharge.      Conjunctiva/sclera: Conjunctivae normal.   Cardiovascular:      Rate and Rhythm: Normal rate.   Pulmonary:      Effort: Pulmonary effort is normal. No respiratory distress.   Musculoskeletal:      Cervical back: Neck supple.   Skin:     Findings: No rash.   Neurological:      Mental Status: He is alert.   Psychiatric:         Mood and Affect: Mood and affect normal.         Behavior: Behavior normal.        Labs:  I reviewed with patient recent labs resulted on 4/13/2023.    CT-LUNG CANCER-SCREENING  Narrative: 1/21/2023 9:40 AM    HISTORY/REASON FOR EXAM:  Lung cancer screening.    TECHNIQUE/EXAM DESCRIPTION AND NUMBER OF VIEWS:  Lung cancer screening without contrast.    Low dose noncontrast helical images were obtained of the chest from the lung apices through the costophrenic sulci utilizing thin collimation and intervals with reconstructed images sent to PACS in axial, coronal and sagittal planes.    Low dose optimization technique was utilized for this CT exam including automated exposure control and adjustment of the mA and/or kV according to patient size.    COMPARISON: 10/10/2016.    FINDINGS:  Lungs: Benign calcified granuloma in the right upper lobe. Several benign intrapulmonary nodes " along the lung fissures.. Atelectasis/scarring in the medial right lower lobe. No focal consolidation or pleural effusions.    Mediastinum: Unremarkable thyroid.    Nodes: No enlarged lymph nodes.    Heart: Not enlarged. No pericardial effusion.    Aorta and great vessels: 3.9 cm ascending aortic ectasia. Atherosclerosis.    Musculoskeletal structures: No acute fracture or destructive lesion. Partially visualized left shoulder arthroplasty. ACDF.    Upper abdomen: Unchanged hypodense lesion in the posterior hepatic segment 7, stable since 2016 and benign.  Impression: 1.  Stable calcified granuloma in the right upper lobe, benign.  2.  No new pulmonary nodules or masses.    Lung RADS: 2 - Benign Appearance or Behavior  Nodules with a very low likelihood of becoming a clinically active cancer due to size or lack of growth    Findings: solid nodule(s): less than 6 mm or new less than 4 mm  part solid nodule(s): less than 6 mm total diameter on baseline screening  non solid nodule(s) (GGN): less than 30 mm OR greater than or equal to 30 mm and unchanged or slowly growing  category 3 or 4 nodules unchanged for greater than or equal to 3 months  perifissural nodule(s) less than 10 mm    Management: Continue annual screening with LDCT in 12 months       Assessment/Plan:         Problem List Items Addressed This Visit       Thrombocytopenia (HCC)     Chronic problem, stable, currently asymptomatic, monitor labs closely.  Recheck CBC with next blood work in 3 months         Relevant Orders    CBC WITHOUT DIFFERENTIAL    Other insomnia     Chronic problem, unstable, increase trazodone to 100 mg daily.         Relevant Medications    traZODone (DESYREL) 100 MG Tab    Aortic atherosclerosis (HCC)     Chronic problem, stable, continue Lipitor 10 mg daily.  We will hold onto starting aspirin as he has thrombocytopenia          Other Visit Diagnoses       Screening for AAA (abdominal aortic aneurysm)      Currently smoking,  counseled regarding quitting smoking    Relevant Orders    US-ABDOMINAL AORTA W/O DOPPLER             Please note that this dictation was created using voice recognition software. I have made every reasonable attempt to correct obvious errors, but I expect that there are errors of grammar and possibly content that I did not discover before finalizing the note.    Follow up in 3 months for lab and medication follow-up.

## 2023-04-14 NOTE — ASSESSMENT & PLAN NOTE
Chronic problem, stable, continue Lipitor 10 mg daily.  We will hold onto starting aspirin as he has thrombocytopenia

## 2023-04-28 DIAGNOSIS — L82.0 SEBORRHEIC KERATOSES, INFLAMED: ICD-10-CM

## 2023-05-01 RX ORDER — CLOBETASOL PROPIONATE 0.5 MG/G
OINTMENT TOPICAL
Qty: 60 G | Refills: 3 | Status: SHIPPED | OUTPATIENT
Start: 2023-05-01

## 2023-05-11 ENCOUNTER — HOSPITAL ENCOUNTER (OUTPATIENT)
Dept: RADIOLOGY | Facility: MEDICAL CENTER | Age: 68
End: 2023-05-11
Attending: FAMILY MEDICINE
Payer: MEDICARE

## 2023-05-11 DIAGNOSIS — Z13.6 SCREENING FOR AAA (ABDOMINAL AORTIC ANEURYSM): ICD-10-CM

## 2023-05-11 PROCEDURE — 76775 US EXAM ABDO BACK WALL LIM: CPT

## 2023-06-08 ENCOUNTER — OFFICE VISIT (OUTPATIENT)
Dept: CARDIOLOGY | Facility: PHYSICIAN GROUP | Age: 68
End: 2023-06-08
Payer: MEDICARE

## 2023-06-08 VITALS
SYSTOLIC BLOOD PRESSURE: 114 MMHG | HEIGHT: 69 IN | BODY MASS INDEX: 25.33 KG/M2 | WEIGHT: 171 LBS | HEART RATE: 59 BPM | DIASTOLIC BLOOD PRESSURE: 60 MMHG | RESPIRATION RATE: 18 BRPM | OXYGEN SATURATION: 97 %

## 2023-06-08 DIAGNOSIS — F17.210 NICOTINE DEPENDENCE, CIGARETTES, UNCOMPLICATED: ICD-10-CM

## 2023-06-08 DIAGNOSIS — I70.0 AORTIC ATHEROSCLEROSIS (HCC): ICD-10-CM

## 2023-06-08 DIAGNOSIS — I35.1 MILD AORTIC INSUFFICIENCY: ICD-10-CM

## 2023-06-08 DIAGNOSIS — I10 ESSENTIAL HYPERTENSION, BENIGN: ICD-10-CM

## 2023-06-08 DIAGNOSIS — K21.9 GASTROESOPHAGEAL REFLUX DISEASE WITHOUT ESOPHAGITIS: ICD-10-CM

## 2023-06-08 DIAGNOSIS — E78.2 MIXED HYPERLIPIDEMIA: ICD-10-CM

## 2023-06-08 PROBLEM — E66.3 OVERWEIGHT: Status: RESOLVED | Noted: 2017-12-11 | Resolved: 2023-06-08

## 2023-06-08 PROCEDURE — 3074F SYST BP LT 130 MM HG: CPT | Performed by: NURSE PRACTITIONER

## 2023-06-08 PROCEDURE — 99214 OFFICE O/P EST MOD 30 MIN: CPT | Performed by: NURSE PRACTITIONER

## 2023-06-08 PROCEDURE — 3078F DIAST BP <80 MM HG: CPT | Performed by: NURSE PRACTITIONER

## 2023-06-08 RX ORDER — ATORVASTATIN CALCIUM 10 MG/1
10 TABLET, FILM COATED ORAL
Qty: 100 TABLET | Refills: 3 | Status: SHIPPED | OUTPATIENT
Start: 2023-06-08

## 2023-06-08 ASSESSMENT — ENCOUNTER SYMPTOMS
INSOMNIA: 1
BRUISES/BLEEDS EASILY: 0
ORTHOPNEA: 0
LOSS OF CONSCIOUSNESS: 0
ABDOMINAL PAIN: 0
COUGH: 0
NAUSEA: 0
DIZZINESS: 0
PALPITATIONS: 0
MYALGIAS: 0
SHORTNESS OF BREATH: 0
FEVER: 0
CHILLS: 0
PND: 0
HEADACHES: 0

## 2023-06-08 ASSESSMENT — FIBROSIS 4 INDEX: FIB4 SCORE: 2.99

## 2023-06-08 NOTE — PROGRESS NOTES
Chief Complaint   Patient presents with    Follow-Up    HTN (Controlled)    Hyperlipidemia       Subjective     Gume Wing is a 67 y.o. male who presents today for annual follow-up of HTN, hyperlipidemia/aortic atherosclerosis, mild AR and tobacco use.    Gume is 67 year male with history of hypertension (not medicated), hyperlipidemia/aortic atherosclerosis, mild AR, GERD and tobacco use, last seen by Dr. Evans in June 2022.    Since the last visit, in July 2022, he had a left hip replacement, which went well. He has been working hard on losing weight over the last couple of years, and he is down 100+ pounds. He feels better.    In January 2023, he had a CT scan of his lung (cancer screening), and no acute changes were seen. He is still smoking 1/2 ppd.    He is here today for annual follow-up. He remains active (walks, bikes) and denies any exercise symptoms: no chest pain, pressure, tightness or discomfort; no symptomatic palpitations; no shortness of breath, orthopnea or PND; no dizziness, lightheadedness or syncope; no LE edema. BP remains stable.     Past Medical History:   Diagnosis Date    Anesthesia     Occasionally wakes up agitated/ anxious    Arthritis     Atypical pigmented skin lesion     Carpal tunnel syndrome     Bilateral, uncontrolled    Contact dermatitis and other eczema, due to unspecified cause     Chronic, controlled with meds    Elevated PSA, less than 10 ng/ml     Epididymitis     Chronic on left    Esophageal reflux     Loss of height     Loss of 1.5 inches 2009,    Migraine     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     Quit in 2009, then restarted    Mixed hyperlipidemia     Osteoarthrosis involving, or with mention of more than one site, but not specified as generalized, multiple sites     Prediabetes     uncontrolled    Seborrheic keratoses, inflamed     Seizure (HCC) 2013    Seizure is visual changes only, none since medication started in 2013    Short-term memory loss     Snoring      Thrombocytopenia (HCC)     Tinnitus     Chronic with hearing loss    Unspecified vitamin D deficiency      Past Surgical History:   Procedure Laterality Date    PB RECONSTR TOTAL SHOULDER IMPLANT Left 12/31/2020    Procedure: ARTHROPLASTY, SHOULDER, TOTAL;  Surgeon: Ric Cooper M.D.;  Location: SURGERY [facility];  Service: Orthopedics    BICEPS TENODESIS Left 12/31/2020    Procedure: TENODESIS, BICEPS;  Surgeon: Ric Cooper M.D.;  Location: SURGERY [facility];  Service: Orthopedics    KNEE ARTHROPLASTY TOTAL  9/15/2014    Performed by Cesar Abreu M.D. at SURGERY [facility] ORS    KNEE ARTHROPLASTY TOTAL  3/7/2011    right    CARPAL TUNNEL ENDOSCOPIC  12/1/2009    Performed by RONNIE MAURICIO at SURGERY [facility] ORS    CERVICAL DISK AND FUSION ANTERIOR  1/26/2009    Performed by AMINA NERI at SURGERY [facility] ORS    TOENAIL REMOVAL  2008    bilateral great toes    ARTHROSCOPY, KNEE  1989    right    HEMORRHOIDECTOMY  1980    VASECTOMY  1979     Family History   Problem Relation Age of Onset    Cancer Mother     Lung Cancer Mother     Breast Cancer Daughter     Lung Disease Other      Social History     Socioeconomic History    Marital status:      Spouse name: Not on file    Number of children: Not on file    Years of education: Not on file    Highest education level: Not on file   Occupational History    Not on file   Tobacco Use    Smoking status: Every Day     Packs/day: 0.50     Years: 43.00     Pack years: 21.50     Types: Cigarettes    Smokeless tobacco: Never   Vaping Use    Vaping Use: Never used   Substance and Sexual Activity    Alcohol use: Not Currently     Alcohol/week: 0.0 oz     Comment: rare    Drug use: No    Sexual activity: Yes     Partners: Female     Birth control/protection: Post-Menopausal     Comment: , 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    Other Environmental      pollen     Outpatient Encounter Medications as of 6/8/2023   Medication Sig Dispense Refill    atorvastatin (LIPITOR) 10 MG Tab Take 1 Tablet by mouth every day. 100 Tablet 3    clobetasol (TEMOVATE) 0.05 % Ointment APPLY TOPICALLY TO AFFECTED AREA(S) 2 TIMES A DAY AS NEEDED. APPLY TO KNEES AS NEEDED 60 g 3    traZODone (DESYREL) 100 MG Tab Take 1 Tablet by mouth every evening. 90 Tablet 3    mupirocin (BACTROBAN) 2 % Ointment Apply 1 Application topically 3 times a day. 22 g 0    omeprazole (PRILOSEC) 20 MG delayed-release capsule TAKE 1 CAPSULE BY MOUTH EVERY DAY 90 Capsule 3    zonisamide (ZONEGRAN) 100 MG Cap       SILODOSIN PO Take  by mouth.      Cholecalciferol ([employer]S VITAMIN D) 2000 UNIT Cap Take 1 Cap by mouth every bedtime. For vitamin D deficiency 100 Cap 3    [DISCONTINUED] atorvastatin (LIPITOR) 10 MG Tab Take 1 Tablet by mouth every day. 90 Tablet 3     No facility-administered encounter medications on file as of 6/8/2023.     Review of Systems   Constitutional:  Negative for chills and fever.   HENT:  Negative for congestion.    Respiratory:  Negative for cough and shortness of breath.   "  Cardiovascular:  Negative for chest pain, palpitations, orthopnea, leg swelling and PND.   Gastrointestinal:  Negative for abdominal pain and nausea.   Musculoskeletal:  Positive for joint pain. Negative for myalgias.   Skin:  Negative for rash.   Neurological:  Negative for dizziness, loss of consciousness and headaches.   Endo/Heme/Allergies:  Does not bruise/bleed easily.   Psychiatric/Behavioral:  The patient has insomnia.               Objective     /60 (BP Location: Left arm, Patient Position: Sitting, BP Cuff Size: Adult)   Pulse (!) 59   Resp 18   Ht 1.753 m (5' 9\")   Wt 77.6 kg (171 lb)   SpO2 97%   BMI 25.25 kg/m²     Physical Exam  Constitutional:       Appearance: He is well-developed.   HENT:      Head: Normocephalic.   Neck:      Vascular: No JVD.   Cardiovascular:      Rate and Rhythm: Normal rate and regular rhythm.      Heart sounds: Normal heart sounds.   Pulmonary:      Effort: Pulmonary effort is normal. No respiratory distress.      Breath sounds: Normal breath sounds. No wheezing or rales.   Abdominal:      General: Bowel sounds are normal. There is no distension.      Palpations: Abdomen is soft.      Tenderness: There is no abdominal tenderness.   Musculoskeletal:         General: Normal range of motion.      Cervical back: Normal range of motion and neck supple.   Skin:     General: Skin is warm and dry.      Findings: No rash.   Neurological:      Mental Status: He is alert and oriented to person, place, and time.   Psychiatric:         Mood and Affect: Mood normal.         Behavior: Behavior normal.       IMPRESSION OF ABDOMINAL US OF 5/11/2023:  1.  No evidence of abdominal aortic aneurysm.  2.  Ectasia of the bilateral common iliac arteries.  3.  Moderate atherosclerotic plaque.     IMPRESSION OF CT SCAN OF CHEST OF 1/21/2023:  1.  Stable calcified granuloma in the right upper lobe, benign.  2.  No new pulmonary nodules or masses.     CONCLUSIONS OF TTE OF 6/14/2021:  Left " ventricular ejection fraction is visually estimated to be 65%.  Mildly dilated lefft atrium.  Mild aortic insufficiency.  Estimated right ventricular systolic pressure is 33 mmHg.  No prior study is available for comparison.     Lab Results   Component Value Date/Time    CHOLSTRLTOT 109 04/13/2023 07:29 AM    LDL 55 04/13/2023 07:29 AM    HDL 43 04/13/2023 07:29 AM    TRIGLYCERIDE 54 04/13/2023 07:29 AM       Lab Results   Component Value Date/Time    SODIUM 142 04/13/2023 07:29 AM    POTASSIUM 4.0 04/13/2023 07:29 AM    CHLORIDE 108 04/13/2023 07:29 AM    CO2 25 04/13/2023 07:29 AM    GLUCOSE 95 04/13/2023 07:29 AM    BUN 11 04/13/2023 07:29 AM    CREATININE 0.91 04/13/2023 07:29 AM    GLOMRATE 89 06/27/2022 10:32 AM     Lab Results   Component Value Date/Time    ASTSGOT 18 04/13/2023 07:29 AM    ALTSGPT 16 04/13/2023 07:29 AM       Lab Results   Component Value Date/Time    WBC 7.2 04/13/2023 07:29 AM    RBC 4.73 04/13/2023 07:29 AM    HEMOGLOBIN 14.7 04/13/2023 07:29 AM    HEMATOCRIT 44.5 04/13/2023 07:29 AM    MCV 94.1 04/13/2023 07:29 AM    MCH 31.1 04/13/2023 07:29 AM    MCHC 33.0 (L) 04/13/2023 07:29 AM    MPV 12.7 04/13/2023 07:29 AM          Assessment & Plan     1. Essential hypertension, benign        2. Mixed hyperlipidemia  atorvastatin (LIPITOR) 10 MG Tab      3. Aortic atherosclerosis (HCC)        4. Mild aortic insufficiency        5. Gastroesophageal reflux disease without esophagitis        6. Nicotine dependence, uncomplicated (Current Smoker)            Medical Decision Making: Today's Assessment/Status/Plan:      1. Hypertension, not currently on any therapy. BP is well controlled.    2. Hyperlipidemia/aortic atherosclerosis, on Lipitor 10mg once daily. LDL is 55.    3. Mild AR, on echo in June 2021; consider repeat echo next year.    4. GERD, treated with PPI, stable.    5. Tobacco use, ongoing. He is down to 1/2 ppd. Discussed trying to quit completely.    6. Status post left hip replacement,  doing well.    He is doing well. We reviewed his abdominal US and CT of chest, as well as labs. Continue Lipitor. He has worked hard on losing weight, and improving diet; he is urged to continue with this. Follow-up annually, sooner if clinical condition changes.

## 2023-07-10 ENCOUNTER — APPOINTMENT (OUTPATIENT)
Dept: MEDICAL GROUP | Facility: MEDICAL CENTER | Age: 68
End: 2023-07-10
Payer: MEDICARE

## 2023-07-12 ENCOUNTER — HOSPITAL ENCOUNTER (OUTPATIENT)
Dept: LAB | Facility: MEDICAL CENTER | Age: 68
End: 2023-07-12
Attending: FAMILY MEDICINE
Payer: MEDICARE

## 2023-07-12 DIAGNOSIS — D69.6 THROMBOCYTOPENIA (HCC): ICD-10-CM

## 2023-07-12 LAB
ERYTHROCYTE [DISTWIDTH] IN BLOOD BY AUTOMATED COUNT: 46 FL (ref 35.9–50)
HCT VFR BLD AUTO: 43.3 % (ref 42–52)
HGB BLD-MCNC: 14.3 G/DL (ref 14–18)
MCH RBC QN AUTO: 31 PG (ref 27–33)
MCHC RBC AUTO-ENTMCNC: 33 G/DL (ref 32.3–36.5)
MCV RBC AUTO: 93.7 FL (ref 81.4–97.8)
PLATELET # BLD AUTO: 116 K/UL (ref 164–446)
PMV BLD AUTO: 12.2 FL (ref 9–12.9)
RBC # BLD AUTO: 4.62 M/UL (ref 4.7–6.1)
WBC # BLD AUTO: 7.2 K/UL (ref 4.8–10.8)

## 2023-07-12 PROCEDURE — 85027 COMPLETE CBC AUTOMATED: CPT

## 2023-07-12 PROCEDURE — 36415 COLL VENOUS BLD VENIPUNCTURE: CPT

## 2023-07-13 ENCOUNTER — OFFICE VISIT (OUTPATIENT)
Dept: MEDICAL GROUP | Facility: MEDICAL CENTER | Age: 68
End: 2023-07-13
Payer: MEDICARE

## 2023-07-13 VITALS
BODY MASS INDEX: 25.47 KG/M2 | RESPIRATION RATE: 17 BRPM | OXYGEN SATURATION: 98 % | HEIGHT: 69 IN | TEMPERATURE: 97.9 F | HEART RATE: 58 BPM | DIASTOLIC BLOOD PRESSURE: 64 MMHG | WEIGHT: 171.96 LBS | SYSTOLIC BLOOD PRESSURE: 124 MMHG

## 2023-07-13 DIAGNOSIS — R21 RASH: ICD-10-CM

## 2023-07-13 DIAGNOSIS — Z12.5 SCREENING FOR PROSTATE CANCER: ICD-10-CM

## 2023-07-13 DIAGNOSIS — R97.20 ELEVATED PSA, LESS THAN 10 NG/ML: ICD-10-CM

## 2023-07-13 DIAGNOSIS — I70.0 AORTIC ATHEROSCLEROSIS (HCC): ICD-10-CM

## 2023-07-13 DIAGNOSIS — D69.6 THROMBOCYTOPENIA (HCC): ICD-10-CM

## 2023-07-13 DIAGNOSIS — R73.03 PREDIABETES: ICD-10-CM

## 2023-07-13 DIAGNOSIS — E55.9 VITAMIN D DEFICIENCY: Chronic | ICD-10-CM

## 2023-07-13 DIAGNOSIS — F32.1 CURRENT MODERATE EPISODE OF MAJOR DEPRESSIVE DISORDER WITHOUT PRIOR EPISODE (HCC): ICD-10-CM

## 2023-07-13 DIAGNOSIS — G47.09 OTHER INSOMNIA: ICD-10-CM

## 2023-07-13 PROCEDURE — 99214 OFFICE O/P EST MOD 30 MIN: CPT | Performed by: FAMILY MEDICINE

## 2023-07-13 PROCEDURE — 3074F SYST BP LT 130 MM HG: CPT | Performed by: FAMILY MEDICINE

## 2023-07-13 PROCEDURE — 3078F DIAST BP <80 MM HG: CPT | Performed by: FAMILY MEDICINE

## 2023-07-13 RX ORDER — TRAZODONE HYDROCHLORIDE 100 MG/1
100 TABLET ORAL NIGHTLY
Qty: 90 TABLET | Refills: 3 | Status: SHIPPED | OUTPATIENT
Start: 2023-07-13

## 2023-07-13 RX ORDER — SILODOSIN 4 MG/1
1 CAPSULE ORAL DAILY
Qty: 90 CAPSULE | Refills: 3 | Status: SHIPPED | OUTPATIENT
Start: 2023-07-13

## 2023-07-13 ASSESSMENT — ENCOUNTER SYMPTOMS
CHILLS: 0
FEVER: 0
PALPITATIONS: 0
INSOMNIA: 1

## 2023-07-13 ASSESSMENT — FIBROSIS 4 INDEX: FIB4 SCORE: 2.64

## 2023-07-13 NOTE — PROGRESS NOTES
FAMILY MEDICINE VISIT                                                               Chief complaint::Diagnoses of Current moderate episode of major depressive disorder without prior episode (HCC), Other insomnia, Aortic atherosclerosis (HCC), Vitamin D deficiency, Prediabetes, Thrombocytopenia (HCC), Rash, Elevated PSA, less than 10 ng/ml, and Screening for prostate cancer were pertinent to this visit.    History of present illness: Mazin Wing is a 68 y.o. male who presented for medication and lab follow-up.    Problem   Current Moderate Episode of Major Depressive Disorder Without Prior Episode (Hcc)    Has been taking trazodone 100 mg.  Reports that he recently stopped taking the medication.  He would like to go back on trazodone.  He is following with therapist     Other Insomnia    He is following with therapist and doing better, he is on trazodone 100 mg daily, reports that he stopped taking medication, would like to go back on same dose     Rash    He gets rash sometimes over his body, uses mupirocin, request a refill for that     Aortic Atherosclerosis (Hcc)    Recent CT showed atherosclerosis.  He is on Lipitor 10 mg daily.  Recent cholesterol panel came back normal.  Platelet count came back mildly low.  Is not taking aspirin due to thrombocytopenia.     Thrombocytopenia (Hcc)    . This is felt to be secondary to Depakote which he discontinued.  He is currently on zonisamide which also causes agranulocytosis.  Recent platelet count improved to 116.    Component      Latest Ref Rng 6/27/2022 4/13/2023 7/12/2023   Platelet Count      164 - 446 K/uL 125 (L) (E) 101 (L)  116 (L)       Legend:  (L) Low  (E) External lab result       Elevated Psa, Less Than 10 Ng/Ml    He is following with urology, his doctor moved.  He is going to establish with new urologist.  He requested refill for Silodosin     Vitamin D Deficiency    Previous history of vitamin D deficiency.  Currently take vitamin D 2000  "international units daily.    Component      Latest Ref Rng 4/13/2023   25-Hydroxy Vitamin D 25      30 - 100 ng/mL 49             Prediabetes    He lost weight and his A1c got significantly improved, last 1 was at 5.5.    Lab Results   Component Value Date/Time    HBA1C 5.5 04/13/2023 07:29 AM              Review of systems:     Review of Systems   Constitutional:  Negative for chills, fever and malaise/fatigue.   Cardiovascular:  Negative for chest pain, palpitations and leg swelling.   Psychiatric/Behavioral:  The patient has insomnia.         Medications and Allergies:     Current Outpatient Medications   Medication Sig Dispense Refill    traZODone (DESYREL) 100 MG Tab Take 1 Tablet by mouth every evening. 90 Tablet 3    mupirocin (BACTROBAN) 2 % Ointment Apply 1 Application topically 3 times a day. 22 g 3    Silodosin 4 MG Cap Take 1 Tablet by mouth every day. 90 Capsule 3    atorvastatin (LIPITOR) 10 MG Tab Take 1 Tablet by mouth every day. 100 Tablet 3    clobetasol (TEMOVATE) 0.05 % Ointment APPLY TOPICALLY TO AFFECTED AREA(S) 2 TIMES A DAY AS NEEDED. APPLY TO KNEES AS NEEDED 60 g 3    omeprazole (PRILOSEC) 20 MG delayed-release capsule TAKE 1 CAPSULE BY MOUTH EVERY DAY 90 Capsule 3    zonisamide (ZONEGRAN) 100 MG Cap       Cholecalciferol (CVS VITAMIN D) 2000 UNIT Cap Take 1 Cap by mouth every bedtime. For vitamin D deficiency 100 Cap 3     No current facility-administered medications for this visit.          Vitals:    /64   Pulse (!) 58   Temp 36.6 °C (97.9 °F)   Resp 17   Ht 1.753 m (5' 9\")   Wt 78 kg (171 lb 15.3 oz)   SpO2 98%  Body mass index is 25.39 kg/m².    Physical Exam:     Physical Exam  Constitutional:       Appearance: Normal appearance. He is well-developed and well-groomed.   HENT:      Head: Normocephalic and atraumatic.      Right Ear: External ear normal.      Left Ear: External ear normal.   Eyes:      General:         Right eye: No discharge.         Left eye: No discharge. "      Conjunctiva/sclera: Conjunctivae normal.   Cardiovascular:      Rate and Rhythm: Normal rate.   Pulmonary:      Effort: Pulmonary effort is normal. No respiratory distress.   Musculoskeletal:      Cervical back: Neck supple.   Skin:     Findings: No rash.   Neurological:      Mental Status: He is alert.   Psychiatric:         Mood and Affect: Mood and affect normal.         Behavior: Behavior normal.          Labs:  I reviewed with patient recent labs resulted on 7/12/2023    Assessment/Plan:         Problem List Items Addressed This Visit       Vitamin D deficiency (Chronic)     Chronic problem, stable, continue vitamin D supplementation.         Relevant Orders    VITAMIN D,25 HYDROXY (DEFICIENCY)    Thrombocytopenia (HCC)     Chronic problem, stable, recheck CBC in April of next year.         Relevant Orders    CBC WITH DIFFERENTIAL    Rash     Chronic problem, stable, continue Bactroban as needed.         Relevant Medications    mupirocin (BACTROBAN) 2 % Ointment    Prediabetes     Chronic problem, stable, recheck A1c with next blood test         Relevant Orders    Comp Metabolic Panel    HEMOGLOBIN A1C    Other insomnia     Chronic problem, unstable, restart trazodone 100 mg daily.         Relevant Medications    traZODone (DESYREL) 100 MG Tab    Elevated PSA, less than 10 ng/ml     Problem, stable, continue same medication silodosin 4 mg daily.         Relevant Medications    Silodosin 4 MG Cap    Current moderate episode of major depressive disorder without prior episode (HCC)     Chronic problem, stable, improving, continue to follow-up with therapist.  Continue trazodone 100 mg daily.         Relevant Medications    traZODone (DESYREL) 100 MG Tab    Aortic atherosclerosis (HCC)     Chronic problem, stable, continue Lipitor 10 mg daily.  Check labs in April of next year.         Relevant Orders    Lipid Profile     Other Visit Diagnoses       Screening for prostate cancer        Relevant Orders     PROSTATE SPECIFIC AG SCREENING             Please note that this dictation was created using voice recognition software. I have made every reasonable attempt to correct obvious errors, but I expect that there are errors of grammar and possibly content that I did not discover before finalizing the note.    Follow up in 9 months for annual and lab follow-up.

## 2023-07-13 NOTE — LETTER
[facility]  Brandon Stephenson M.D.  [address]  [place] [address]  Fax: [phone]   Authorization for Release/Disclosure of   Protected Health Information   Name: KIKA BAIRD : 1955 SSN: [id number]   Address: [address] [place] [id number] Phone:    [phone] (home)    I authorize the entity listed below to release/disclose the PHI below to:   [facility]/Brandon Stephenson M.D. and Brandon Stephenson M.D.   Provider or Entity Name:     Address   City, State, Zip   Phone:      Fax:     Reason for request: continuity of care   Information to be released:    [  ] LAST COLONOSCOPY,  including any PATH REPORT and follow-up  [  ] LAST FIT/COLOGUARD RESULT [  ] LAST DEXA  [  ] LAST MAMMOGRAM  [  ] LAST PAP  [  ] LAST LABS [  ] RETINA EXAM REPORT  [  ] IMMUNIZATION RECORDS  [  ] Release all info      [  ] Check here and initial the line next to each item to release ALL health information INCLUDING  _____ Care and treatment for drug and / or alcohol abuse  _____ HIV testing, infection status, or AIDS  _____ Genetic Testing    DATES OF SERVICE OR TIME PERIOD TO BE DISCLOSED: _____________  I understand and acknowledge that:  * This Authorization may be revoked at any time by you in writing, except if your health information has already been used or disclosed.  * Your health information that will be used or disclosed as a result of you signing this authorization could be re-disclosed by the recipient. If this occurs, your re-disclosed health information may no longer be protected by State or Federal laws.  * You may refuse to sign this Authorization. Your refusal will not affect your ability to obtain treatment.  * This Authorization becomes effective upon signing and will  on (date) __________.      If no date is indicated, this Authorization will  one (1) year from the signature date.    Name: Kika Baird  Signature: Date:   2023     PLEASE FAX REQUESTED  RECORDS BACK TO: [phone]

## 2023-07-13 NOTE — ASSESSMENT & PLAN NOTE
Chronic problem, stable, improving, continue to follow-up with therapist.  Continue trazodone 100 mg daily.

## 2024-01-09 RX ORDER — OMEPRAZOLE 20 MG/1
CAPSULE, DELAYED RELEASE ORAL
Qty: 90 CAPSULE | Refills: 3 | Status: SHIPPED | OUTPATIENT
Start: 2024-01-09

## 2024-02-14 ENCOUNTER — OFFICE VISIT (OUTPATIENT)
Dept: URGENT CARE | Facility: PHYSICIAN GROUP | Age: 69
End: 2024-02-14
Payer: MEDICARE

## 2024-02-14 VITALS
DIASTOLIC BLOOD PRESSURE: 82 MMHG | TEMPERATURE: 98 F | OXYGEN SATURATION: 98 % | HEART RATE: 65 BPM | BODY MASS INDEX: 28.18 KG/M2 | WEIGHT: 190.8 LBS | RESPIRATION RATE: 16 BRPM | SYSTOLIC BLOOD PRESSURE: 120 MMHG

## 2024-02-14 DIAGNOSIS — S61.214A LACERATION OF RIGHT RING FINGER WITHOUT FOREIGN BODY WITHOUT DAMAGE TO NAIL, INITIAL ENCOUNTER: ICD-10-CM

## 2024-02-14 PROCEDURE — 12001 RPR S/N/AX/GEN/TRNK 2.5CM/<: CPT | Mod: F8

## 2024-02-14 PROCEDURE — 3079F DIAST BP 80-89 MM HG: CPT

## 2024-02-14 PROCEDURE — 3074F SYST BP LT 130 MM HG: CPT

## 2024-02-14 RX ORDER — AMOXICILLIN 500 MG/1
500 CAPSULE ORAL 3 TIMES DAILY
Qty: 15 CAPSULE | Refills: 0 | Status: SHIPPED | OUTPATIENT
Start: 2024-02-14 | End: 2024-02-19

## 2024-02-14 ASSESSMENT — ENCOUNTER SYMPTOMS
FEVER: 0
FOCAL WEAKNESS: 0
SENSORY CHANGE: 0

## 2024-02-14 ASSESSMENT — FIBROSIS 4 INDEX: FIB4 SCORE: 2.64

## 2024-02-14 NOTE — PROGRESS NOTES
Subjective:     CHIEF COMPLAINT  Chief Complaint   Patient presents with    Laceration     Right ring finger- cut on dog kennel- top of [facility]  Mazin Wing is a very pleasant 68 y.o. male who presents with a laceration to the dorsal surface of his right ring finger after accidentally cutting himself on a dog kennel this morning.  He reports full range of motion retained as well as full sensation.  His most recent tetanus vaccination was in 2021.  He is otherwise feeling well at this time.  He was able to control bleeding prior to being seen in the clinic.    REVIEW OF SYSTEMS  Review of Systems   Constitutional:  Negative for fever.   Neurological:  Negative for sensory change and focal weakness.       PAST MEDICAL HISTORY  Patient Active Problem List    Diagnosis Date Noted    Current moderate episode of major depressive disorder without prior episode ([facility]) 02/17/2023    Other insomnia 02/17/2023    Rash 01/13/2023    Nicotine dependence, uncomplicated (Current Smoker) 01/13/2023    Mild aortic insufficiency 06/23/2022    Aortic atherosclerosis ([facility]) 05/17/2021    Thrombocytopenia ([facility]) 02/24/2020    Memory loss 01/20/2020    Elevated PSA, less than 10 ng/ml 02/25/2019    Pulmonary nodules 11/01/2016    Abnormal chest x-ray with multiple lung nodules 10/10/2016    Hair loss 09/29/2016    Nonintractable migraine 08/31/2015    Peripheral neuropathy 07/09/2015    Seizure ([facility]) 2013    Vitamin D deficiency 03/08/2010    Essential hypertension, benign     Osteoarthritis of multiple joints     Contact dermatitis and other eczema, due to unspecified cause     Esophageal reflux     Carpal tunnel syndrome     Prediabetes     Mixed hyperlipidemia 06/05/2009       SURGICAL HISTORY   has a past surgical history that includes toenail removal (2008); cervical disk and fusion anterior (1/26/2009); hemorrhoidectomy (1980); arthroscopy, knee (1989); carpal tunnel endoscopic (12/1/2009); knee arthroplasty total  (3/7/2011); knee arthroplasty total (9/15/2014); vasectomy (1979); reconstr total shoulder implant (Left, 12/31/2020); and biceps tenodesis (Left, 12/31/2020).    ALLERGIES  Allergies   Allergen Reactions    Erythromycin      Severe stomach pain    Latex Rash     .    Other Environmental      pollen       CURRENT MEDICATIONS  Home Medications       Reviewed by Terese Pugh P.A.-C. (Physician Assistant) on 02/14/24 at 0920  Med List Status: <None>     Medication Last Dose Status   atorvastatin (LIPITOR) 10 MG Tab Taking Active   Cholecalciferol ([employer]S VITAMIN D) 2000 UNIT Cap Taking Active   clobetasol (TEMOVATE) 0.05 % Ointment PRN Active   mupirocin (BACTROBAN) 2 % Ointment Taking Active   omeprazole (PRILOSEC) 20 MG delayed-release capsule Taking Active   Silodosin 4 MG Cap Taking Active   traZODone (DESYREL) 100 MG Tab Taking Active   zonisamide (ZONEGRAN) 100 MG Cap Taking Active                    SOCIAL HISTORY  Social History     Tobacco Use    Smoking status: Every Day     Current packs/day: 0.50     Average packs/day: 0.5 packs/day for 43.0 years (21.5 ttl pk-yrs)     Types: Cigarettes    Smokeless tobacco: Never   Vaping Use    Vaping Use: Never used   Substance and Sexual Activity    Alcohol use: Not Currently     Alcohol/week: 0.0 oz     Comment: rare    Drug use: No    Sexual activity: Yes     Partners: Female     Birth control/protection: Post-Menopausal     Comment: , 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 Exam conducted with a chaperone present.   Constitutional:       General: He is not in acute distress.     Appearance: Normal  appearance. He is not ill-appearing or toxic-appearing.   HENT:      Head: Normocephalic and atraumatic.      Mouth/Throat:      Mouth: Mucous membranes are moist.   Eyes:      Conjunctiva/sclera: Conjunctivae normal.      Pupils: Pupils are equal, round, and reactive to light.   Pulmonary:      Effort: Pulmonary effort is normal. No respiratory distress.   Musculoskeletal:        Hands:       Comments: Curvilinear laceration present on dorsal surface of right ring finger.  No wound contamination.  No foreign bodies evident.  Bleeding controlled.  Full active range of motion preserved in right ring finger.  Sensation 5/5.  Capillary refill present less than 2 seconds.  Laceration measuring approximately 1 cm in length.   Skin:     General: Skin is warm and dry.   Neurological:      General: No focal deficit present.      Mental Status: He is alert and oriented to person, place, and time.   Psychiatric:         Mood and Affect: Mood normal.   Laceration Repair    Date/Time: 2/14/2024 9:51 AM    Performed by: Terese Pugh P.A.-C.  Authorized by: Terese Pugh P.A.-C.  Body area: upper extremity  Location details: right ring finger  Laceration length: 1 cm  Foreign bodies: no foreign bodies  Tendon involvement: none  Nerve involvement: none  Vascular damage: no  Anesthesia: digital block    Anesthesia:  Local Anesthetic: lidocaine 1% without epinephrine  Anesthetic total: 3 mL    Sedation:  Patient sedated: no    Preparation: Patient was prepped and draped in the usual sterile fashion.  Irrigation solution: saline  Irrigation method: syringe  Amount of cleaning: extensive  Debridement: none  Degree of undermining: none  Skin closure: Ethilon  Number of sutures: 5  Technique: simple  Approximation: close  Approximation difficulty: simple  Dressing: 4x4 sterile gauze  Patient tolerance: patient tolerated the procedure well with no immediate complications  Comments: Patient provided finger splint to help  avoid bending finger and reopening laceration           Assessment/Plan:     1. Laceration of right ring finger without foreign body without damage to nail, initial encounter  -Keep laceration site clean and dry for 24 to 48 hours.  After 48 hours May gently wash area with warm soapy water  -Monitor signs of infection and return to clinic if any signs of infection occur  -Tylenol/ibuprofen over-the-counter as needed for discomfort  -Return to clinic in 10 days for suture removal     MDM/Comments:  Patient has stable vital signs and is non-toxic appearing.  Patient had a curvilinear laceration to the right ring finger.  Laceration repair performed in office.  Patient tolerated procedure well.  Patient's tetanus did not need to be updated at this time.  Prophylactic antibiotics initiated with amoxicillin.  Discussed wound care and Tylenol/Ibuprofen as needed. Patient demonstrated understanding of treatment plan at this time and will RTC in 10 days or sooner if symptoms worsen.     Differential diagnosis, natural history, supportive care, and indications for immediate follow-up discussed. All questions answered. Patient agrees with the plan of care.    Follow-up as needed if symptoms worsen or fail to improve to PCP, Urgent care or Emergency Room.    I have personally reviewed prior external notes and test results pertinent to today's visit.  I have independently reviewed and interpreted all diagnostics ordered during this urgent care acute visit.   Discussed management options (risks,benefits, and alternatives to treatment). Pt expresses understanding and the treatment plan was agreed upon. Questions were encouraged and answered to pt's satisfaction.    Please note that this dictation was created using voice recognition software. I have made a reasonable attempt to correct obvious errors, but I expect that there are errors of grammar and possibly content that I did not discover before finalizing the note.

## 2024-02-24 ENCOUNTER — OFFICE VISIT (OUTPATIENT)
Dept: URGENT CARE | Facility: PHYSICIAN GROUP | Age: 69
End: 2024-02-24
Payer: MEDICARE

## 2024-02-24 VITALS
TEMPERATURE: 97.9 F | SYSTOLIC BLOOD PRESSURE: 122 MMHG | HEIGHT: 69 IN | WEIGHT: 190 LBS | OXYGEN SATURATION: 98 % | RESPIRATION RATE: 14 BRPM | BODY MASS INDEX: 28.14 KG/M2 | DIASTOLIC BLOOD PRESSURE: 78 MMHG | HEART RATE: 60 BPM

## 2024-02-24 DIAGNOSIS — Z48.02 ENCOUNTER FOR REMOVAL OF SUTURES: ICD-10-CM

## 2024-02-24 PROCEDURE — 3078F DIAST BP <80 MM HG: CPT | Performed by: FAMILY MEDICINE

## 2024-02-24 PROCEDURE — 99212 OFFICE O/P EST SF 10 MIN: CPT | Mod: 25 | Performed by: FAMILY MEDICINE

## 2024-02-24 PROCEDURE — 3074F SYST BP LT 130 MM HG: CPT | Performed by: FAMILY MEDICINE

## 2024-02-24 ASSESSMENT — FIBROSIS 4 INDEX: FIB4 SCORE: 2.64

## 2024-02-24 NOTE — PROGRESS NOTES
Chief Complaint:    Chief Complaint   Patient presents with    Suture / Staple Removal     Stitch removal 5 stitches placed, R hand ring finger,        History of Present Illness:    Wife present. Had sutures placed to right 4th finger laceration on 2/14/24 by other provider, visit reviewed. Here for possible removal. 2 sutures came out 4 days ago because of his dog and young child. Has Mupirocin ointment he has been applying to the wound.      Past Medical History:    Past Medical History:   Diagnosis Date    Anesthesia     Occasionally wakes up agitated/ anxious    Arthritis     Atypical pigmented skin lesion     Carpal tunnel syndrome     Bilateral, uncontrolled    Contact dermatitis and other eczema, due to unspecified cause     Chronic, controlled with meds    Elevated PSA, less than 10 ng/ml     Epididymitis     Chronic on left    Esophageal reflux     Loss of height     Loss of 1.5 inches 2009,    Migraine     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     Quit in 2009, then restarted    Mixed hyperlipidemia     Osteoarthrosis involving, or with mention of more than one site, but not specified as generalized, multiple sites     Prediabetes     uncontrolled    Seborrheic keratoses, inflamed     Seizure (HCC) 2013    Seizure is visual changes only, none since medication started in 2013    Short-term memory loss     Snoring     Thrombocytopenia (HCC)     Tinnitus     Chronic with hearing loss    Unspecified vitamin D deficiency      Past Surgical History:    Past Surgical History:   Procedure Laterality Date    PB RECONSTR TOTAL SHOULDER IMPLANT Left 12/31/2020    Procedure: ARTHROPLASTY, SHOULDER, TOTAL;  Surgeon: Ric Cooper M.D.;  Location: SURGERY [facility];  Service: Orthopedics    BICEPS TENODESIS Left 12/31/2020    Procedure: TENODESIS, BICEPS;  Surgeon: Ric Cooper M.D.;  Location: SURGERY [facility];  Service: Orthopedics    KNEE ARTHROPLASTY TOTAL  9/15/2014    Performed by Cesar GODWIN  DELMI Abreu at SURGERY [facility] ORS    KNEE ARTHROPLASTY TOTAL  3/7/2011    right    CARPAL TUNNEL ENDOSCOPIC  12/1/2009    Performed by RONNIE MAURICIO at SURGERY [facility] ORS    CERVICAL DISK AND FUSION ANTERIOR  1/26/2009    Performed by AMINA NERI at SURGERY [facility]    TOENAIL REMOVAL  2008    bilateral great toes    ARTHROSCOPY, KNEE  1989    right    HEMORRHOIDECTOMY  1980    VASECTOMY  1979     Social History:    Social History     Socioeconomic History    Marital status:      Spouse name: Not on file    Number of children: Not on file    Years of education: Not on file    Highest education level: Not on file   Occupational History    Not on file   Tobacco Use    Smoking status: Every Day     Current packs/day: 0.50     Average packs/day: 0.5 packs/day for 43.0 years (21.5 ttl pk-yrs)     Types: Cigarettes    Smokeless tobacco: Never   Vaping Use    Vaping Use: Never used   Substance and Sexual Activity    Alcohol use: Not Currently     Alcohol/week: 0.0 oz     Comment: rare    Drug use: No    Sexual activity: Yes     Partners: Female     Birth control/protection: Post-Menopausal     Comment: , work at [employer] at [employer]   Other Topics Concern     Service Not Asked    Blood Transfusions Not Asked    Caffeine Concern Not Asked    Occupational Exposure Not Asked    Hobby Hazards Not Asked    Sleep Concern Not Asked    Stress Concern Not Asked    Weight Concern Not Asked    Special Diet Not Asked    Back Care Not Asked    Exercise No    Bike Helmet Not Asked    Seat Belt Not Asked    Self-Exams Not Asked   Social History Narrative    , 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[employer]     Social Determinants of Health     Financial Resource Strain: Not on file   Food Insecurity: Not on file   Transportation Needs: Not on file   Physical Activity: Not on file   Stress: Not on file   Social Connections: Not on file   Intimate Partner Violence: Not on file  "  Housing Stability: Not on file     Family History:    Family History   Problem Relation Age of Onset    Cancer Mother     Lung Cancer Mother     Breast Cancer Daughter     Lung Disease Other      Medications:    Current Outpatient Medications on File Prior to Visit   Medication Sig Dispense Refill    omeprazole (PRILOSEC) 20 MG delayed-release capsule TAKE 1 CAPSULE BY MOUTH EVERY DAY 90 Capsule 3    traZODone (DESYREL) 100 MG Tab Take 1 Tablet by mouth every evening. 90 Tablet 3    mupirocin (BACTROBAN) 2 % Ointment Apply 1 Application topically 3 times a day. 22 g 3    Silodosin 4 MG Cap Take 1 Tablet by mouth every day. 90 Capsule 3    atorvastatin (LIPITOR) 10 MG Tab Take 1 Tablet by mouth every day. 100 Tablet 3    clobetasol (TEMOVATE) 0.05 % Ointment APPLY TOPICALLY TO AFFECTED AREA(S) 2 TIMES A DAY AS NEEDED. APPLY TO KNEES AS NEEDED 60 g 3    zonisamide (ZONEGRAN) 100 MG Cap       Cholecalciferol ([employer]S VITAMIN D) 2000 UNIT Cap Take 1 Cap by mouth every bedtime. For vitamin D deficiency 100 Cap 3     No current facility-administered medications on file prior to visit.     Allergies:    Allergies   Allergen Reactions    Erythromycin      Severe stomach pain    Latex Rash     .    Other Environmental      pollen       Vitals:    Vitals:    02/24/24 0906   BP: 122/78   Pulse: 60   Resp: 14   Temp: 36.6 °C (97.9 °F)   TempSrc: Temporal   SpO2: 98%   Weight: 86.2 kg (190 lb)   Height: 1.753 m (5' 9\")       Physical Exam:    Constitutional: Vital signs reviewed. Appears well-developed and well-nourished. No acute distress.   Eyes: Sclera white, conjunctivae clear.  ENT: External ears normal. Hearing normal.  Pulmonary/Chest: Respirations non-labored.  Musculoskeletal: Normal gait. Normal range of motion. No muscular atrophy or weakness.  Neurological: Alert and oriented to person, place, and time. Muscle tone normal. Coordination normal. Light touch and sensation normal.  Skin: Right 4th finger dorsum: " curvilinear scabbed wound with 3 interrupted sutures in place. Wound looks like it came apart some over the past 10 days (2 sutures came out 4 days ago), but wound is clean, dry, and intact (scabbed) without signs of infection.  Psychiatric: Normal mood and affect. Behavior is normal. Judgment and thought content normal.       Assessment / Plan & Medical Decision Makin. Encounter for removal of sutures      Wife present. Had sutures placed to right 4th finger laceration on 24 by other provider, visit reviewed. Here for possible removal. 2 sutures came out 4 days ago because of his dog and young child. Has Mupirocin ointment he has been applying to the wound.    Right 4th finger dorsum: curvilinear scabbed wound with 3 interrupted sutures in place. Wound looks like it came apart some over the past 10 days (2 sutures came out 4 days ago), but wound is clean, dry, and intact (scabbed) without signs of infection.    All sutures removed without complication.    Recommended to continue Mupirocin antibiotic ointment to wound until completely healed.    Will return to urgent care if needed.

## 2024-04-12 ENCOUNTER — HOSPITAL ENCOUNTER (OUTPATIENT)
Dept: LAB | Facility: MEDICAL CENTER | Age: 69
End: 2024-04-12
Attending: FAMILY MEDICINE
Payer: MEDICARE

## 2024-04-12 DIAGNOSIS — R73.03 PREDIABETES: ICD-10-CM

## 2024-04-12 DIAGNOSIS — I70.0 AORTIC ATHEROSCLEROSIS (HCC): ICD-10-CM

## 2024-04-12 DIAGNOSIS — E55.9 VITAMIN D DEFICIENCY: Chronic | ICD-10-CM

## 2024-04-12 DIAGNOSIS — Z12.5 SCREENING FOR PROSTATE CANCER: ICD-10-CM

## 2024-04-12 DIAGNOSIS — D69.6 THROMBOCYTOPENIA (HCC): ICD-10-CM

## 2024-04-12 LAB
25(OH)D3 SERPL-MCNC: 43 NG/ML (ref 30–100)
ALBUMIN SERPL BCP-MCNC: 4.2 G/DL (ref 3.2–4.9)
ALBUMIN/GLOB SERPL: 1.6 G/DL
ALP SERPL-CCNC: 86 U/L (ref 30–99)
ALT SERPL-CCNC: 14 U/L (ref 2–50)
ANION GAP SERPL CALC-SCNC: 10 MMOL/L (ref 7–16)
AST SERPL-CCNC: 13 U/L (ref 12–45)
BASOPHILS # BLD AUTO: 0.7 % (ref 0–1.8)
BASOPHILS # BLD: 0.05 K/UL (ref 0–0.12)
BILIRUB SERPL-MCNC: 0.5 MG/DL (ref 0.1–1.5)
BUN SERPL-MCNC: 15 MG/DL (ref 8–22)
CALCIUM ALBUM COR SERPL-MCNC: 8.9 MG/DL (ref 8.5–10.5)
CALCIUM SERPL-MCNC: 9.1 MG/DL (ref 8.5–10.5)
CHLORIDE SERPL-SCNC: 107 MMOL/L (ref 96–112)
CHOLEST SERPL-MCNC: 137 MG/DL (ref 100–199)
CO2 SERPL-SCNC: 24 MMOL/L (ref 20–33)
CREAT SERPL-MCNC: 0.86 MG/DL (ref 0.5–1.4)
EOSINOPHIL # BLD AUTO: 0.4 K/UL (ref 0–0.51)
EOSINOPHIL NFR BLD: 5.4 % (ref 0–6.9)
ERYTHROCYTE [DISTWIDTH] IN BLOOD BY AUTOMATED COUNT: 44.8 FL (ref 35.9–50)
EST. AVERAGE GLUCOSE BLD GHB EST-MCNC: 114 MG/DL
FASTING STATUS PATIENT QL REPORTED: NORMAL
GFR SERPLBLD CREATININE-BSD FMLA CKD-EPI: 94 ML/MIN/1.73 M 2
GLOBULIN SER CALC-MCNC: 2.7 G/DL (ref 1.9–3.5)
GLUCOSE SERPL-MCNC: 102 MG/DL (ref 65–99)
HBA1C MFR BLD: 5.6 % (ref 4–5.6)
HCT VFR BLD AUTO: 44.6 % (ref 42–52)
HDLC SERPL-MCNC: 46 MG/DL
HGB BLD-MCNC: 15.1 G/DL (ref 14–18)
IMM GRANULOCYTES # BLD AUTO: 0.04 K/UL (ref 0–0.11)
IMM GRANULOCYTES NFR BLD AUTO: 0.5 % (ref 0–0.9)
LDLC SERPL CALC-MCNC: 73 MG/DL
LYMPHOCYTES # BLD AUTO: 2.4 K/UL (ref 1–4.8)
LYMPHOCYTES NFR BLD: 32.5 % (ref 22–41)
MCH RBC QN AUTO: 30.5 PG (ref 27–33)
MCHC RBC AUTO-ENTMCNC: 33.9 G/DL (ref 32.3–36.5)
MCV RBC AUTO: 90.1 FL (ref 81.4–97.8)
MONOCYTES # BLD AUTO: 0.53 K/UL (ref 0–0.85)
MONOCYTES NFR BLD AUTO: 7.2 % (ref 0–13.4)
NEUTROPHILS # BLD AUTO: 3.96 K/UL (ref 1.82–7.42)
NEUTROPHILS NFR BLD: 53.7 % (ref 44–72)
NRBC # BLD AUTO: 0 K/UL
NRBC BLD-RTO: 0 /100 WBC (ref 0–0.2)
PLATELET # BLD AUTO: 119 K/UL (ref 164–446)
PMV BLD AUTO: 12.1 FL (ref 9–12.9)
POTASSIUM SERPL-SCNC: 4.2 MMOL/L (ref 3.6–5.5)
PROT SERPL-MCNC: 6.9 G/DL (ref 6–8.2)
PSA SERPL-MCNC: 2.83 NG/ML (ref 0–4)
RBC # BLD AUTO: 4.95 M/UL (ref 4.7–6.1)
SODIUM SERPL-SCNC: 141 MMOL/L (ref 135–145)
TRIGL SERPL-MCNC: 90 MG/DL (ref 0–149)
WBC # BLD AUTO: 7.4 K/UL (ref 4.8–10.8)

## 2024-04-12 PROCEDURE — 36415 COLL VENOUS BLD VENIPUNCTURE: CPT | Mod: GA

## 2024-04-12 PROCEDURE — 83036 HEMOGLOBIN GLYCOSYLATED A1C: CPT | Mod: GA

## 2024-04-12 PROCEDURE — 80053 COMPREHEN METABOLIC PANEL: CPT

## 2024-04-12 PROCEDURE — 85025 COMPLETE CBC W/AUTO DIFF WBC: CPT

## 2024-04-12 PROCEDURE — 82306 VITAMIN D 25 HYDROXY: CPT

## 2024-04-12 PROCEDURE — 80061 LIPID PANEL: CPT

## 2024-04-12 PROCEDURE — 84153 ASSAY OF PSA TOTAL: CPT | Mod: GA

## 2024-04-16 ENCOUNTER — OFFICE VISIT (OUTPATIENT)
Dept: MEDICAL GROUP | Facility: MEDICAL CENTER | Age: 69
End: 2024-04-16
Payer: MEDICARE

## 2024-04-16 VITALS
HEART RATE: 66 BPM | BODY MASS INDEX: 29.07 KG/M2 | SYSTOLIC BLOOD PRESSURE: 120 MMHG | HEIGHT: 68 IN | OXYGEN SATURATION: 97 % | TEMPERATURE: 97.8 F | WEIGHT: 191.8 LBS | RESPIRATION RATE: 17 BRPM | DIASTOLIC BLOOD PRESSURE: 62 MMHG

## 2024-04-16 DIAGNOSIS — G56.00 CARPAL TUNNEL SYNDROME, UNSPECIFIED LATERALITY: ICD-10-CM

## 2024-04-16 DIAGNOSIS — G62.9 PERIPHERAL POLYNEUROPATHY: ICD-10-CM

## 2024-04-16 DIAGNOSIS — I10 ESSENTIAL HYPERTENSION, BENIGN: ICD-10-CM

## 2024-04-16 DIAGNOSIS — D69.6 THROMBOCYTOPENIA (HCC): ICD-10-CM

## 2024-04-16 DIAGNOSIS — R73.03 PREDIABETES: ICD-10-CM

## 2024-04-16 DIAGNOSIS — R56.9 SEIZURE (HCC): ICD-10-CM

## 2024-04-16 DIAGNOSIS — F17.210 NICOTINE DEPENDENCE, CIGARETTES, UNCOMPLICATED: ICD-10-CM

## 2024-04-16 DIAGNOSIS — G47.09 OTHER INSOMNIA: ICD-10-CM

## 2024-04-16 DIAGNOSIS — R97.20 ELEVATED PSA, LESS THAN 10 NG/ML: ICD-10-CM

## 2024-04-16 DIAGNOSIS — E78.2 MIXED HYPERLIPIDEMIA: ICD-10-CM

## 2024-04-16 DIAGNOSIS — R91.8 PULMONARY NODULES: ICD-10-CM

## 2024-04-16 DIAGNOSIS — I70.0 AORTIC ATHEROSCLEROSIS (HCC): ICD-10-CM

## 2024-04-16 DIAGNOSIS — E55.9 VITAMIN D DEFICIENCY: Chronic | ICD-10-CM

## 2024-04-16 DIAGNOSIS — Z12.11 SCREENING FOR COLORECTAL CANCER: ICD-10-CM

## 2024-04-16 DIAGNOSIS — Z00.00 MEDICARE ANNUAL WELLNESS VISIT, INITIAL: ICD-10-CM

## 2024-04-16 DIAGNOSIS — G43.001 MIGRAINE WITHOUT AURA AND WITH STATUS MIGRAINOSUS, NOT INTRACTABLE: ICD-10-CM

## 2024-04-16 DIAGNOSIS — R41.3 MEMORY LOSS: ICD-10-CM

## 2024-04-16 DIAGNOSIS — Z12.12 SCREENING FOR COLORECTAL CANCER: ICD-10-CM

## 2024-04-16 DIAGNOSIS — M15.9 PRIMARY OSTEOARTHRITIS INVOLVING MULTIPLE JOINTS: ICD-10-CM

## 2024-04-16 DIAGNOSIS — I72.3 ILIAC ARTERY ANEURYSM (HCC): ICD-10-CM

## 2024-04-16 DIAGNOSIS — I35.1 MILD AORTIC INSUFFICIENCY: ICD-10-CM

## 2024-04-16 DIAGNOSIS — F32.1 CURRENT MODERATE EPISODE OF MAJOR DEPRESSIVE DISORDER WITHOUT PRIOR EPISODE (HCC): ICD-10-CM

## 2024-04-16 DIAGNOSIS — K21.9 GASTROESOPHAGEAL REFLUX DISEASE WITHOUT ESOPHAGITIS: ICD-10-CM

## 2024-04-16 PROBLEM — R21 RASH: Status: RESOLVED | Noted: 2023-01-13 | Resolved: 2024-04-16

## 2024-04-16 PROCEDURE — 99214 OFFICE O/P EST MOD 30 MIN: CPT | Mod: 25 | Performed by: FAMILY MEDICINE

## 2024-04-16 PROCEDURE — 3074F SYST BP LT 130 MM HG: CPT | Performed by: FAMILY MEDICINE

## 2024-04-16 PROCEDURE — G0438 PPPS, INITIAL VISIT: HCPCS | Performed by: FAMILY MEDICINE

## 2024-04-16 PROCEDURE — 3078F DIAST BP <80 MM HG: CPT | Performed by: FAMILY MEDICINE

## 2024-04-16 RX ORDER — ATORVASTATIN CALCIUM 20 MG/1
20 TABLET, FILM COATED ORAL
Qty: 90 TABLET | Refills: 2 | Status: SHIPPED | OUTPATIENT
Start: 2024-04-16

## 2024-04-16 ASSESSMENT — PATIENT HEALTH QUESTIONNAIRE - PHQ9
1. LITTLE INTEREST OR PLEASURE IN DOING THINGS: NOT AT ALL
4. FEELING TIRED OR HAVING LITTLE ENERGY: NOT AT ALL
CLINICAL INTERPRETATION OF PHQ2 SCORE: 0
6. FEELING BAD ABOUT YOURSELF - OR THAT YOU ARE A FAILURE OR HAVE LET YOURSELF OR YOUR FAMILY DOWN: NOT AL ALL
3. TROUBLE FALLING OR STAYING ASLEEP OR SLEEPING TOO MUCH: NOT AT ALL
2. FEELING DOWN, DEPRESSED, IRRITABLE, OR HOPELESS: NOT AT ALL
5. POOR APPETITE OR OVEREATING: NOT AT ALL
SUM OF ALL RESPONSES TO PHQ9 QUESTIONS 1 AND 2: 0
7. TROUBLE CONCENTRATING ON THINGS, SUCH AS READING THE NEWSPAPER OR WATCHING TELEVISION: NOT AT ALL
SUM OF ALL RESPONSES TO PHQ QUESTIONS 1-9: 0
9. THOUGHTS THAT YOU WOULD BE BETTER OFF DEAD, OR OF HURTING YOURSELF: NOT AT ALL
8. MOVING OR SPEAKING SO SLOWLY THAT OTHER PEOPLE COULD HAVE NOTICED. OR THE OPPOSITE, BEING SO FIGETY OR RESTLESS THAT YOU HAVE BEEN MOVING AROUND A LOT MORE THAN USUAL: NOT AT ALL

## 2024-04-16 ASSESSMENT — ENCOUNTER SYMPTOMS
SHORTNESS OF BREATH: 0
GENERAL WELL-BEING: GOOD
PALPITATIONS: 0
FEVER: 0
CHILLS: 0
MEMORY LOSS: 1
COUGH: 0

## 2024-04-16 ASSESSMENT — FIBROSIS 4 INDEX: FIB4 SCORE: 1.985369225635152572

## 2024-04-16 ASSESSMENT — ACTIVITIES OF DAILY LIVING (ADL): BATHING_REQUIRES_ASSISTANCE: 0

## 2024-04-16 NOTE — ASSESSMENT & PLAN NOTE
Chronic condition, mildly elevated LDL level, goal LDL is less than 70.  Increase Lipitor to 20 mg daily.

## 2024-04-16 NOTE — PROGRESS NOTES
Chief Complaint   Patient presents with    Annual Exam       HPI:  Mazin Wing is a 68 y.o. here for Medicare Annual Wellness Visit     Patient Active Problem List    Diagnosis Date Noted    Iliac artery aneurysm (HCC) 04/16/2024    Current moderate episode of major depressive disorder without prior episode (HCC) 02/17/2023    Other insomnia 02/17/2023    Nicotine dependence, uncomplicated (Current Smoker) 01/13/2023    Mild aortic insufficiency 06/23/2022    Aortic atherosclerosis (HCC) 05/17/2021    Thrombocytopenia ([facility]) 02/24/2020    Memory loss 01/20/2020    Elevated PSA, less than 10 ng/ml 02/25/2019    Pulmonary nodules 11/01/2016    Nonintractable migraine 08/31/2015    Peripheral neuropathy 07/09/2015    Seizure ([facility]) 2013    Vitamin D deficiency 03/08/2010    Essential hypertension, benign     Osteoarthritis of multiple joints     Esophageal reflux     Carpal tunnel syndrome     Prediabetes     Mixed hyperlipidemia 06/05/2009       Current Outpatient Medications   Medication Sig Dispense Refill    atorvastatin (LIPITOR) 20 MG Tab Take 1 Tablet by mouth every day. 90 Tablet 2    omeprazole (PRILOSEC) 20 MG delayed-release capsule TAKE 1 CAPSULE BY MOUTH EVERY DAY 90 Capsule 3    traZODone (DESYREL) 100 MG Tab Take 1 Tablet by mouth every evening. 90 Tablet 3    mupirocin (BACTROBAN) 2 % Ointment Apply 1 Application topically 3 times a day. 22 g 3    Silodosin 4 MG Cap Take 1 Tablet by mouth every day. 90 Capsule 3    clobetasol (TEMOVATE) 0.05 % Ointment APPLY TOPICALLY TO AFFECTED AREA(S) 2 TIMES A DAY AS NEEDED. APPLY TO KNEES AS NEEDED 60 g 3    zonisamide (ZONEGRAN) 100 MG Cap       Cholecalciferol ([employer] VITAMIN D) 2000 UNIT Cap Take 1 Cap by mouth every bedtime. For vitamin D deficiency 100 Cap 3     No current facility-administered medications for this visit.          Current supplements as per medication list.     Allergies: Erythromycin, Latex, and Other environmental    Current social  contact/activities: Spends time with family and friends.    He  reports that he has been smoking cigarettes. He has a 21.5 pack-year smoking history. He has never used smokeless tobacco. He reports that he does not currently use alcohol. He reports that he does not use drugs.  Ready to quit: Not Answered  Counseling given: Not Answered      ROS:    Gait: Uses no assistive device  Ostomy: No  Other tubes: No  Amputations: No  Chronic oxygen use: No  Last eye exam: going in next month  Wears hearing aids: No   : Denies any urinary leakage during the last 6 months    Screening:    Depression Screening  Little interest or pleasure in doing things?  0 - not at all  Feeling down, depressed , or hopeless? 0 - not at all    If depressive symptoms identified deferred to follow up visit unless specifically addressed in assessment and plan.    Interpretation of PHQ-9 Total Score   Score Severity   1-4 No Depression   5-9 Mild Depression   10-14 Moderate Depression   15-19 Moderately Severe Depression   20-27 Severe Depression    Screening for Cognitive Impairment  Do you or any of your friends or family members have any concern about your memory? Yes  Three Minute Recall (Leader, Season, Table) 2/3    Nico clock face with all 12 numbers and set the hands to show 10 minutes after 11.  Yes    Cognitive concerns identified deferred for follow up unless specifically addressed in assessment and plan.    Fall Risk Assessment  Has the patient had two or more falls in the last year or any fall with injury in the last year?  No    Safety Assessment  Do you always wear your seatbelt?  Yes  Any changes to home needed to function safely? No  Difficulty hearing.  Yes  Patient counseled about all safety risks that were identified.    Functional Assessment ADLs  Are there any barriers preventing you from cooking for yourself or meeting nutritional needs?  No.    Are there any barriers preventing you from driving safely or obtaining  transportation?  No.    Are there any barriers preventing you from using a telephone or calling for help?  No    Are there any barriers preventing you from shopping?  No.    Are there any barriers preventing you from taking care of your own finances?  No    Are there any barriers preventing you from managing your medications?  No    Are there any barriers preventing you from showering, bathing or dressing yourself? No    Are there any barriers preventing you from doing housework or laundry? No    Are there any barriers preventing you from using the toilet?No    Are you currently engaging in any exercise or physical activity?  Yes.      Self-Assessment of Health  What is your perception of your health? Good    Do you sleep more than six hours a night? Yes    In the past 7 days, how much did pain keep you from doing your normal work? Some Depends what he's doing  Do you spend quality time with family or friends (virtually or in person)? No    Do you usually eat a heart healthy diet that constists of a variety of fruits, vegetables, whole grains and fiber? Yes    Do you eat foods high in fat and/or Fast Food more than three times per week? No    How concerned are you that your medical conditions are not being well managed? Not at all    Are you worried that in the next 2 months, you may not have stable housing that you own, rent, or stay in as part of a household? No        Advance Care Planning  Do you have an Advance Directive, Living Will, Durable Power of , or POLST? No                 Health Maintenance Summary            Overdue - Colorectal Cancer Screening (Colonoscopy - Every 10 Years) Order placed this encounter      07/20/2012  Colonoscopy (Previously completed - multiple polyps, needs follow up in 1 year)              Ordered - Lung Cancer Screening (Every 6 Months) Ordered on 4/16/2024 01/21/2023  CT-LUNG CANCER-SCREENING    10/10/2016  CT-LUNG CANCER-SCREENING              Overdue - COVID-19  Vaccine (4 - 2023-24 season) Overdue since 9/1/2023 12/11/2022  Outside Immunization: COVID mRNA Bivalent(MOD)    12/05/2021  Imm Admin: MODERNA SARS-COV-2 VACCINE (12+)    04/21/2021  Imm Admin: MODERNA SARS-COV-2 VACCINE (12+)    03/24/2021  Imm Admin: MODERNA SARS-COV-2 VACCINE (12+)              Annual Wellness Visit (Yearly) Next due on 4/16/2025 04/16/2024  Visit Dx: Medicare annual wellness visit, initial    04/16/2024  Level of Service: INITIAL ANNUAL WELLNESS VISIT-INCLUDES PPPS              IMM DTaP/Tdap/Td Vaccine (3 - Td or Tdap) Next due on 5/17/2031 05/17/2021  Imm Admin: Tdap Vaccine    05/16/2011  Imm Admin: Tdap Vaccine              Hepatitis C Screening  Tentatively Complete      11/01/2021  Hepatitis C Antibody component of HEP C VIRUS ANTIBODY              Abdominal Aortic Aneurysm (AAA) Screening  Tentatively Complete      05/11/2023  US-ABDOMINAL AORTA W/O DOPPLER              Pneumococcal Vaccine: 65+ Years (Series Information) Completed      06/02/2023  Outside Immunization: PCV20    01/13/2023  Imm Admin: Pneumococcal Conjugate Vaccine (PCV20)    09/14/2020  Imm Admin: Pneumococcal polysaccharide vaccine (PPSV-23)              Influenza Vaccine (Series Information) Completed      08/14/2023  Outside Immunization: Fluzone High-Dose Quad    11/14/2022  Imm Admin: Influenza Vaccine Adult HD    11/08/2021  Imm Admin: Influenza Vaccine Adult HD    09/01/2020  Imm Admin: Influenza Vaccine Adult HD    01/05/2020  Imm Admin: Influenza Vaccine Quad Inj (Pf)    Only the first 5 history entries have been loaded, but more history exists.              Zoster (Shingles) Vaccines (Series Information) Completed      08/14/2023  Outside Immunization: Zoster Loy (Shingrix)    06/02/2023  Outside Immunization: Zoster Loy (Shingrix)              Hepatitis A Vaccine (Hep A) (Series Information) Aged Out      No completion history exists for this topic.              Hepatitis B Vaccine (Hep B)  (Series Information) Aged Out      No completion history exists for this topic.              HPV Vaccines (Series Information) Aged Out      No completion history exists for this topic.              Polio Vaccine (Inactivated Polio) (Series Information) Aged Out      No completion history exists for this topic.              Meningococcal Immunization (Series Information) Aged Out      No completion history exists for this topic.                    Patient Care Team:  Brandon Stephenson M.D. as PCP - General (Family Medicine)  Janay Cazares M.D. (Pulmonary Medicine)      Social History     Tobacco Use    Smoking status: Every Day     Current packs/day: 0.50     Average packs/day: 0.5 packs/day for 43.0 years (21.5 ttl pk-yrs)     Types: Cigarettes    Smokeless tobacco: Never   Vaping Use    Vaping Use: Never used   Substance Use Topics    Alcohol use: Not Currently     Alcohol/week: 0.0 oz     Comment: rare    Drug use: No     Family History   Problem Relation Age of Onset    Cancer Mother     Lung Cancer Mother     Breast Cancer Daughter     Lung Disease Other      He  has a past medical history of Anesthesia, Arthritis, Atypical pigmented skin lesion, Carpal tunnel syndrome, Contact dermatitis and other eczema, due to unspecified cause, Elevated PSA, less than 10 ng/ml, Epididymitis, Esophageal reflux, Loss of height, Migraine, 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, Mixed hyperlipidemia, Osteoarthrosis involving, or with mention of more than one site, but not specified as generalized, multiple sites, Prediabetes, Seborrheic keratoses, inflamed, Seizure (HCC) (2013), Short-term memory loss, Snoring, Thrombocytopenia (HCC), Tinnitus, and Unspecified vitamin D deficiency.   Past Surgical History:   Procedure Laterality Date    PB RECONSTR TOTAL SHOULDER IMPLANT Left 12/31/2020    Procedure: ARTHROPLASTY, SHOULDER, TOTAL;  Surgeon: Ric Cooper M.D.;  Location: SURGERY [facility];  Service: Orthopedics     "BICEPS TENODESIS Left 12/31/2020    Procedure: TENODESIS, BICEPS;  Surgeon: Ric Cooper M.D.;  Location: SURGERY [facility];  Service: Orthopedics    KNEE ARTHROPLASTY TOTAL  9/15/2014    Performed by Cesar Abreu M.D. at SURGERY [facility] ORS    KNEE ARTHROPLASTY TOTAL  3/7/2011    right    CARPAL TUNNEL ENDOSCOPIC  12/1/2009    Performed by RONNIE MAURICIO at SURGERY [facility] ORS    CERVICAL DISK AND FUSION ANTERIOR  1/26/2009    Performed by AMINA NERI at SURGERY [facility] ORS    TOENAIL REMOVAL  2008    bilateral great toes    ARTHROSCOPY, KNEE  1989    right    HEMORRHOIDECTOMY  1980    VASECTOMY  1979       Exam:   /62   Pulse 66   Temp 36.6 °C (97.8 °F)   Resp 17   Ht 1.727 m (5' 8\")   Wt 87 kg (191 lb 12.8 oz)   SpO2 97%  Body mass index is 29.16 kg/m².    Hearing fair.    Dentition fair  Alert, oriented in no acute distress.  Eye contact is good, speech goal directed, affect calm    Assessment and Plan. The following treatment and monitoring plan is recommended:      1. Medicare annual wellness visit, initial    2. Aortic atherosclerosis (HCC)  - Lipid Profile; Future    3. Carpal tunnel syndrome, unspecified laterality    4. Thrombocytopenia (HCC)  - CBC WITH DIFFERENTIAL; Future    5. Prediabetes  - Comp Metabolic Panel; Future  - HEMOGLOBIN A1C; Future    6. Current moderate episode of major depressive disorder without prior episode (HCC)    7. Elevated PSA, less than 10 ng/ml    8. Gastroesophageal reflux disease without esophagitis    9. Essential hypertension, benign    10. Memory loss  - MR-BRAIN-W/O; Future    11. Mild aortic insufficiency    12. Mixed hyperlipidemia  - atorvastatin (LIPITOR) 20 MG Tab; Take 1 Tablet by mouth every day.  Dispense: 90 Tablet; Refill: 2    13. Migraine without aura and with status migrainosus, not intractable    14. Primary osteoarthritis involving multiple joints    15. Other insomnia    16. Peripheral polyneuropathy    17. " Pulmonary nodules    18. Vitamin D deficiency    19. Seizure (HCC)    20. Nicotine dependence, uncomplicated (Current Smoker)  - CT-LUNG CANCER-SCREENING; Future    21. Screening for colorectal cancer  - Referral to GI for Colonoscopy    22. Iliac artery aneurysm (HCC)  - Referral to Vascular Medicine      Services suggested: No services needed at this time  Health Care Screening: Age-appropriate preventive services recommended by USPTF and ACIP covered by Medicare were discussed today. Services ordered if indicated and agreed upon by the patient.  Referrals offered: Community-based lifestyle interventions to reduce health risks and promote self-management and wellness, fall prevention, nutrition, physical activity, tobacco-use cessation, weight loss, and mental health services as per orders if indicated.    Discussion today about general wellness and lifestyle habits:    Prevent falls and reduce trip hazards; Cautioned about securing or removing rugs.  Have a working fire alarm and carbon monoxide detector;   Engage in regular physical activity and social activities         Problem   Iliac Artery Aneurysm (Hcc)    US on 05/2023 showed No evidence of abdominal aortic aneurysm.  2.  Ectasia of the bilateral common iliac arteries.  3.  Moderate atherosclerotic plaque.     Current Moderate Episode of Major Depressive Disorder Without Prior Episode (Hcc)    Has been taking trazodone 100 mg.  Doing well with this medication.     Other Insomnia    On trazodone 100 mg 1 tablet daily     Nicotine dependence, uncomplicated (Current Smoker)    He smokes about half a pack of cigarettes a day, he has been smoking for 40 years.  Due for lung CT which I ordered today     Mild Aortic Insufficiency    Following with cardiology.  Last echo in 2021 showed Left ventricular ejection fraction is visually estimated to be 65%.  Mildly dilated left atrium.  Mild aortic insufficiency.  Estimated right ventricular systolic pressure is 33  mmHg     Aortic Atherosclerosis (Hcc)     CT showed atherosclerosis.  He is on Lipitor 10 mg daily.   Is not taking aspirin due to thrombocytopenia.    Lab Results   Component Value Date/Time    CHOLSTRLTOT 137 04/12/2024 0742    TRIGLYCERIDE 90 04/12/2024 0742    HDL 46 04/12/2024 0742    LDL 73 04/12/2024 0742         Thrombocytopenia (Hcc)    . This is felt to be secondary to Depakote which he discontinued.  He is currently on zonisamide which also causes agranulocytosis.  Recent platelet count improved to 119.           Memory Loss    He reports that he is having memory problems, would like to do further evaluation.  He is following with neurology for seizures.  Provider: Rayo lin     Elevated Psa, Less Than 10 Ng/Ml    Recent PSA level came back normal     Pulmonary Nodules    CT lung 01/2023 showed 1.  Stable calcified granuloma in the right upper lobe, benign.  2.  No new pulmonary nodules or masses.     Nonintractable Migraine    Doing well without medications.     Peripheral Neuropathy    Chronic condition, doing well without medications.     Seizure (Hcc)    seizure is visual changes only, none since medication started in 2013.  Following with neurology.  On zonisamide     Vitamin D Deficiency    Previous history of vitamin D deficiency.  Currently take vitamin D 2000 international units daily.         Essential Hypertension, Benign    Blood pressure today 120/62.  He is currently not on any medication.     Osteoarthritis of Multiple Joints    chronic condition, doing well without medications     Esophageal Reflux    On omeprazole 20 mg daily, doing well with this medication     Carpal Tunnel Syndrome    Doing well without medications     Prediabetes    A1c at 5.6. Not on any medications.    Lab Results   Component Value Date/Time    HBA1C 5.6 04/12/2024 07:42 AM         Mixed Hyperlipidemia      Recent lipid panel showed cholesterol levels in normal range.  He is on atorvastatin 10 mg daily.  Doing  well with this medication.    Lab Results   Component Value Date/Time    CHOLSTRLTOT 137 04/12/2024 0742    TRIGLYCERIDE 90 04/12/2024 0742    HDL 46 04/12/2024 0742    LDL 73 04/12/2024 0742             Review of Systems   Constitutional:  Negative for chills and fever.   HENT:  Positive for hearing loss.    Respiratory:  Negative for cough and shortness of breath.    Cardiovascular:  Negative for chest pain, palpitations and leg swelling.   Psychiatric/Behavioral:  Positive for memory loss.         Physical Exam  Constitutional:       Appearance: Normal appearance. He is well-developed and well-groomed.   HENT:      Head: Normocephalic and atraumatic.      Right Ear: Tympanic membrane, ear canal and external ear normal.      Left Ear: Tympanic membrane, ear canal and external ear normal.   Eyes:      General:         Right eye: No discharge.         Left eye: No discharge.      Conjunctiva/sclera: Conjunctivae normal.   Cardiovascular:      Rate and Rhythm: Normal rate and regular rhythm.      Heart sounds: Normal heart sounds. No murmur heard.     No friction rub. No gallop.   Pulmonary:      Effort: Pulmonary effort is normal. No respiratory distress.      Breath sounds: Normal breath sounds. No wheezing or rales.   Neurological:      General: No focal deficit present.      Mental Status: He is alert. Mental status is at baseline.      Gait: Gait is intact.   Psychiatric:         Mood and Affect: Mood and affect normal.         Behavior: Behavior normal.        I reviewed with patient labs resulted on 4/12/2024.    Problem List Items Addressed This Visit           Mixed hyperlipidemia     Chronic condition, stable, goal LDL is less than 70, increase Lipitor to 20 mg daily         Relevant Medications    atorvastatin (LIPITOR) 20 MG Tab    Prediabetes     Chronic condition, stable, recheck labs in 4 months         Relevant Orders    Comp Metabolic Panel    HEMOGLOBIN A1C    Pulmonary nodules     Chronic  condition, stable, recheck CT as he is currently smoking             Memory loss     Chronic condition unstable, check MRI of the brain, follow-up with neurology         Relevant Orders    MR-BRAIN-W/O    Thrombocytopenia (HCC)     Chronic condition, stable, recheck CBC in 4 months.         Relevant Orders    CBC WITH DIFFERENTIAL        Aortic atherosclerosis (HCC)     Chronic condition, mildly elevated LDL level, goal LDL is less than 70.  Increase Lipitor to 20 mg daily.         Relevant Medications    atorvastatin (LIPITOR) 20 MG Tab    Other Relevant Orders    Lipid Profile      Nicotine dependence, uncomplicated (Current Smoker)    Relevant Orders    CT-LUNG CANCER-SCREENING            Iliac artery aneurysm (HCC)     Chronic problem, unstable, referral to vascular medicine placed.         Relevant Medications    atorvastatin (LIPITOR) 20 MG Tab    Other Relevant Orders    Referral to Vascular Medicine       Follow-up in 1 year for annual physical, 4 months for lab follow-up.

## 2024-04-25 ENCOUNTER — TELEPHONE (OUTPATIENT)
Dept: SCHEDULING | Facility: IMAGING CENTER | Age: 69
End: 2024-04-25

## 2024-05-07 ENCOUNTER — HOSPITAL ENCOUNTER (OUTPATIENT)
Dept: RADIOLOGY | Facility: MEDICAL CENTER | Age: 69
End: 2024-05-07
Attending: FAMILY MEDICINE
Payer: MEDICARE

## 2024-05-07 DIAGNOSIS — F17.210 NICOTINE DEPENDENCE, CIGARETTES, UNCOMPLICATED: ICD-10-CM

## 2024-05-16 ENCOUNTER — TELEPHONE (OUTPATIENT)
Dept: VASCULAR LAB | Facility: MEDICAL CENTER | Age: 69
End: 2024-05-16
Payer: MEDICARE

## 2024-05-16 NOTE — TELEPHONE ENCOUNTER
Spoke to patient in regarding NP appointment.    Any recent testing (labs or imaging) outside of [facility]?  No    Were any records requested?  No    New patient packet sent via [facility] or mail?  Yes    Referral attached to appointment (renewals and New patients only)? Yes    Is appointment virtual? No  
17-Jul-2021 20:13

## 2024-05-22 ENCOUNTER — OFFICE VISIT (OUTPATIENT)
Dept: VASCULAR LAB | Facility: MEDICAL CENTER | Age: 69
End: 2024-05-22
Attending: FAMILY MEDICINE
Payer: MEDICARE

## 2024-05-22 VITALS
BODY MASS INDEX: 30.23 KG/M2 | DIASTOLIC BLOOD PRESSURE: 76 MMHG | SYSTOLIC BLOOD PRESSURE: 136 MMHG | HEART RATE: 51 BPM | WEIGHT: 198.8 LBS

## 2024-05-22 DIAGNOSIS — I10 PRIMARY HYPERTENSION: ICD-10-CM

## 2024-05-22 DIAGNOSIS — E78.2 MIXED HYPERLIPIDEMIA: ICD-10-CM

## 2024-05-22 DIAGNOSIS — I72.3 ILIAC ARTERY ANEURYSM, BILATERAL (HCC): ICD-10-CM

## 2024-05-22 DIAGNOSIS — R73.03 PREDIABETES: ICD-10-CM

## 2024-05-22 DIAGNOSIS — I35.1 MILD AORTIC INSUFFICIENCY: ICD-10-CM

## 2024-05-22 DIAGNOSIS — R56.9 SEIZURE (HCC): ICD-10-CM

## 2024-05-22 DIAGNOSIS — I73.9 PAD (PERIPHERAL ARTERY DISEASE) (HCC): ICD-10-CM

## 2024-05-22 DIAGNOSIS — Z79.02 LONG TERM (CURRENT) USE OF ANTITHROMBOTICS/ANTIPLATELETS: ICD-10-CM

## 2024-05-22 DIAGNOSIS — D69.6 THROMBOCYTOPENIA (HCC): ICD-10-CM

## 2024-05-22 DIAGNOSIS — F17.200 TOBACCO USE DISORDER: ICD-10-CM

## 2024-05-22 DIAGNOSIS — I70.0 AORTIC ATHEROSCLEROSIS (HCC): ICD-10-CM

## 2024-05-22 DIAGNOSIS — I25.10 CORONARY ARTERY CALCIFICATION SEEN ON CT SCAN: ICD-10-CM

## 2024-05-22 PROCEDURE — 3075F SYST BP GE 130 - 139MM HG: CPT | Performed by: FAMILY MEDICINE

## 2024-05-22 PROCEDURE — 99204 OFFICE O/P NEW MOD 45 MIN: CPT | Mod: 25 | Performed by: FAMILY MEDICINE

## 2024-05-22 PROCEDURE — 3078F DIAST BP <80 MM HG: CPT | Performed by: FAMILY MEDICINE

## 2024-05-22 PROCEDURE — 99406 BEHAV CHNG SMOKING 3-10 MIN: CPT | Performed by: FAMILY MEDICINE

## 2024-05-22 RX ORDER — TELMISARTAN 20 MG/1
20 TABLET ORAL DAILY
Qty: 100 TABLET | Refills: 3 | Status: SHIPPED | OUTPATIENT
Start: 2024-05-22

## 2024-05-22 RX ORDER — ATORVASTATIN CALCIUM 40 MG/1
40 TABLET, FILM COATED ORAL DAILY
Qty: 100 TABLET | Refills: 3 | Status: SHIPPED | OUTPATIENT
Start: 2024-05-22

## 2024-05-22 RX ORDER — ASPIRIN 81 MG/1
81 TABLET ORAL DAILY
COMMUNITY
Start: 2024-05-22

## 2024-05-22 RX ORDER — EZETIMIBE 10 MG/1
10 TABLET ORAL DAILY
Qty: 100 TABLET | Refills: 3 | Status: SHIPPED | OUTPATIENT
Start: 2024-05-22

## 2024-05-22 ASSESSMENT — ENCOUNTER SYMPTOMS
COUGH: 0
BRUISES/BLEEDS EASILY: 0
SPUTUM PRODUCTION: 0
CLAUDICATION: 0
SHORTNESS OF BREATH: 0
FEVER: 0
PALPITATIONS: 0
CHILLS: 0
WHEEZING: 0
ORTHOPNEA: 0
PND: 0
HEMOPTYSIS: 0
BLOOD IN STOOL: 0

## 2024-05-22 ASSESSMENT — FIBROSIS 4 INDEX: FIB4 SCORE: 1.985369225635152572

## 2024-05-22 NOTE — PROGRESS NOTES
VASCULAR MEDICINE CLINIC - INITIAL VISIT   24     Mazin Wing is a 68 y.o. male  who has been referred for vascular medicine evaluation and management for PAD/iliac artery aneurysm (IAA)  Referring provider: Brandon Stephenson M.D.     Subjective      Iliac artery aneurysm / PAD  Initial visit hx: seen by PCP 2024 for medicare AWV, noted to have IAA after screening AAA duplex due to smoking   Possible chronic limb ischemic sx: denies rest pain, nonhealing leg wounds, cold extremities, coloration changes    Pertinent PAD pmxh:  Tobacco hx:  reports that he has been smoking cigarettes. He has a 21.5 pack-year smoking history. He has never used smokeless tobacco.    Other established ASCVD:  CAD - subclinical per CT scan, no prior events, no sx     HTN: no prior dx or meds, Home BP los/80s  HLD: Stable, current treatment: lifestyle modifications and Moderate intensity statin - tolerating, good adherence  Antithrombotic tx: ASA 81mg - no hx of bleeding    Dysglycemia: no    Patient Active Problem List    Diagnosis Date Noted    PAD (peripheral artery disease) (HCC) 2024    Coronary artery calcification seen on CT scan 2024    Long term (current) use of antithrombotics/antiplatelets 2024    Iliac artery aneurysm (HCC) 2024    Current moderate episode of major depressive disorder without prior episode (HCC) 2023    Other insomnia 2023    Nicotine dependence, uncomplicated (Current Smoker) 2023    Mild aortic insufficiency 2022    Aortic atherosclerosis (HCC) 2021    Thrombocytopenia ([facility]) 2020    Memory loss 2020    Elevated PSA, less than 10 ng/ml 2019    Pulmonary nodules 2016    Nonintractable migraine 2015    Tobacco use disorder 2015    Peripheral neuropathy 2015    Seizure ([facility]) 2013    Vitamin D deficiency 2010    Primary hypertension     Osteoarthritis of multiple joints      Esophageal reflux     Carpal tunnel syndrome     Prediabetes     Mixed hyperlipidemia 06/05/2009      Past Surgical History:   Procedure Laterality Date    PB RECONSTR TOTAL SHOULDER IMPLANT Left 12/31/2020    Procedure: ARTHROPLASTY, SHOULDER, TOTAL;  Surgeon: Ric Cooper M.D.;  Location: [facility];  Service: Orthopedics    BICEPS TENODESIS Left 12/31/2020    Procedure: TENODESIS, BICEPS;  Surgeon: Ric Cooper M.D.;  Location: SUR[facility];  Service: Orthopedics    KNEE ARTHROPLASTY TOTAL  9/15/2014    Performed by Cesar Abreu M.D. at SURGERY [facility] ORS    KNEE ARTHROPLASTY TOTAL  3/7/2011    right    CARPAL TUNNEL ENDOSCOPIC  12/1/2009    Performed by RONNIE MAURICIO at SURGERY [facility]S    CERVICAL DISK AND FUSION ANTERIOR  1/26/2009    Performed by AMINA NERI at SURGERY [facility]    TOENAIL REMOVAL  2008    bilateral great toes    ARTHROSCOPY, KNEE  1989    right    HEMORRHOIDECTOMY  1980    VASECTOMY  1979      Family History   Problem Relation Age of Onset    Cancer Mother     Lung Cancer Mother     Breast Cancer Daughter     Lung Disease Other       Current Outpatient Medications on File Prior to Visit   Medication Sig Dispense Refill    coenzyme Q-10 30 MG capsule Take 30 mg by mouth every day.      omeprazole (PRILOSEC) 20 MG delayed-release capsule TAKE 1 CAPSULE BY MOUTH EVERY DAY 90 Capsule 3    traZODone (DESYREL) 100 MG Tab Take 1 Tablet by mouth every evening. 90 Tablet 3    mupirocin (BACTROBAN) 2 % Ointment Apply 1 Application topically 3 times a day. 22 g 3    Silodosin 4 MG Cap Take 1 Tablet by mouth every day. 90 Capsule 3    clobetasol (TEMOVATE) 0.05 % Ointment APPLY TOPICALLY TO AFFECTED AREA(S) 2 TIMES A DAY AS NEEDED. APPLY TO KNEES AS NEEDED 60 g 3    zonisamide (ZONEGRAN) 100 MG Cap       Cholecalciferol ([employer]S VITAMIN D) 2000 UNIT Cap Take 1 Cap by mouth every bedtime. For vitamin D deficiency 100 Cap 3     No current  facility-administered medications on file prior to visit.      Allergies   Allergen Reactions    Erythromycin      Severe stomach pain    Latex Rash     .    Other Environmental      pollen        Social History     Tobacco Use    Smoking status: Every Day     Current packs/day: 0.50     Average packs/day: 0.5 packs/day for 43.0 years (21.5 ttl pk-yrs)     Types: Cigarettes    Smokeless tobacco: Never    Tobacco comments:     5/21/24 starting to quit 5sig yesterday    Vaping Use    Vaping status: Never Used   Substance Use Topics    Alcohol use: Not Currently     Alcohol/week: 0.0 oz     Comment: rare    Drug use: No     DIET AND EXERCISE:  Weight Change:stable  Diet: common adult  Exercise: no regular exercise program     Review of Systems   Constitutional:  Negative for chills, fever and malaise/fatigue.   HENT:  Negative for nosebleeds.    Respiratory:  Negative for cough, hemoptysis, sputum production, shortness of breath and wheezing.    Cardiovascular:  Negative for chest pain, palpitations, orthopnea, claudication, leg swelling and PND.   Gastrointestinal:  Negative for blood in stool.   Endo/Heme/Allergies:  Does not bruise/bleed easily.          Objective    Vitals:    05/22/24 1034   BP: 136/76   BP Location: Left arm   Patient Position: Sitting   BP Cuff Size: Adult   Pulse: (!) 51   Weight: 90.2 kg (198 lb 12.8 oz)      BP Readings from Last 4 Encounters:   05/22/24 136/76   04/16/24 120/62   02/24/24 122/78   02/14/24 120/82      Body mass index is 30.23 kg/m².   Wt Readings from Last 4 Encounters:   05/22/24 90.2 kg (198 lb 12.8 oz)   04/16/24 87 kg (191 lb 12.8 oz)   02/24/24 86.2 kg (190 lb)   02/14/24 86.5 kg (190 lb 12.8 oz)      Physical Exam  Constitutional:       General: He is not in acute distress.     Appearance: Normal appearance. He is not diaphoretic.   HENT:      Head: Normocephalic and atraumatic.   Eyes:      Conjunctiva/sclera: Conjunctivae normal.   Cardiovascular:      Rate and  "Rhythm: Normal rate and regular rhythm.      Pulses:           Carotid pulses are 2+ on the right side and 2+ on the left side.       Radial pulses are 2+ on the right side and 2+ on the left side.        Dorsalis pedis pulses are 2+ on the right side and 2+ on the left side.        Posterior tibial pulses are 2+ on the right side and 2+ on the left side.      Heart sounds: Normal heart sounds.   Pulmonary:      Effort: Pulmonary effort is normal.      Breath sounds: Normal breath sounds.   Musculoskeletal:      Right lower leg: No edema.      Left lower leg: No edema.   Skin:     General: Skin is warm and dry.   Neurological:      Mental Status: He is alert and oriented to person, place, and time.      Cranial Nerves: No cranial nerve deficit.      Gait: Gait is intact.   Psychiatric:         Mood and Affect: Mood and affect normal.          DATA REVIEW    Lab Results   Component Value Date/Time    CHOLSTRLTOT 137 04/12/2024 07:42 AM    LDL 73 04/12/2024 07:42 AM    HDL 46 04/12/2024 07:42 AM    TRIGLYCERIDE 90 04/12/2024 07:42 AM       No results found for: \"LIPOPROTA\"   No results found for: \"APOB\"   Lab Results   Component Value Date/Time    SODIUM 141 04/12/2024 07:42 AM    POTASSIUM 4.2 04/12/2024 07:42 AM    CHLORIDE 107 04/12/2024 07:42 AM    CO2 24 04/12/2024 07:42 AM    GLUCOSE 102 (H) 04/12/2024 07:42 AM    BUN 15 04/12/2024 07:42 AM    CREATININE 0.86 04/12/2024 07:42 AM    GLOMRATE 89 06/27/2022 10:32 AM     Lab Results   Component Value Date/Time    ALKPHOSPHAT 86 04/12/2024 07:42 AM    ASTSGOT 13 04/12/2024 07:42 AM    ALTSGPT 14 04/12/2024 07:42 AM    TBILIRUBIN 0.5 04/12/2024 07:42 AM       Lab Results   Component Value Date/Time    HBA1C 5.6 04/12/2024 07:42 AM       Lab Results   Component Value Date/Time    MALBCRT see below 06/08/2015 10:20 AM    MICROALBUR <0.5 06/08/2015 10:20 AM         Cardiovascular Imaging:    Echo 2021  Left ventricular ejection fraction is visually estimated to be " 65%.  Mildly dilated left atrium.  Mild aortic insufficiency.  Estimated right ventricular systolic pressure is 33 mmHg.   No prior study is available for comparison    Ao/iliac duplex 5/2023  Proximal abdominal aorta measures 2.56 cm x 2.59 cm.   Mid abdominal aorta measures 2.01 cm x 2.28 cm.   Distal abdominal aorta measures 1.82 cm x 2.35 cm.   The right common iliac artery measures 1.05 cm x 1.91 cm, the left 1.07 cm x 2.09 cm.   No incidental abnormalities in the visualized portions of the retroperitoneum are identified.   IMPRESSION:   1.  No evidence of abdominal aortic aneurysm.  2.  Ectasia of the bilateral common iliac arteries.  3.  Moderate atherosclerotic plaque.    LDCT 5/2024  Thoracic aorta and great vessels:  No aneurysm.   Heart and pericardium:  There is mild coronary artery calcification. No pericardial effusion.        Medical Decision Making:  Today's Assessment / Status / Plan:     1. Iliac artery aneurysm, bilateral (HCC)  CTA ABDOMEN PELVIS W & W/O POST PROCESS      2. PAD (peripheral artery disease) (HCC)  CTA ABDOMEN PELVIS W & W/O POST PROCESS      3. Aortic atherosclerosis (HCC)  CRP HIGH SENSITIVE (CARDIAC)    Lipid Profile    Lipoprotein (a)      4. Primary hypertension  telmisartan (MICARDIS) 20 MG tablet    Comp Metabolic Panel    CRP HIGH SENSITIVE (CARDIAC)    CBC WITHOUT DIFFERENTIAL    MICROALBUMIN CREAT RATIO URINE      5. Mixed hyperlipidemia  atorvastatin (LIPITOR) 40 MG Tab    ezetimibe (ZETIA) 10 MG Tab      6. Seizure (HCC)        7. Prediabetes        8. Thrombocytopenia (HCC)        9. Tobacco use disorder        10. Coronary artery calcification seen on CT scan        11. Mild aortic insufficiency        12. Long term (current) use of antithrombotics/antiplatelets  aspirin 81 MG EC tablet         Etiology of Established CVD if Present:     1) bilat iliac artery aneurysms   5/2023 duplex:  R = 1.91cm   /  L  = 2.09cm  - in generally, <1.85cm is consider normal for men,  "so he has small bilat aneurysms   - constitutes type of inflow PAD, no indication of associated AAA or more distal PAD  - No indications of true chronic limb threatening ischemia (CLTI) changes or hx of lifestyle-limiting symptoms   - as reviewed, non-surgical med mgmt and tobacco cessation are key to reduce expansion rate  - surgery recommended at 3+cm and definitely by >3.5cm to limit risk for dissection and rupture   Plan:  - continue lifestyle measures and tobacco avoidance  - start/continue structured exercise therapy for 12 weeks as outlined in care instructions, monitor pain-free and total walking distance to monitor progress  - continue secondary prevention treatment goals, intensive med mgmt and lifestyle interventions   - update CTA a/p for interval annual surveillance, if stable, then annual Ao/iliac duplex for ongoing surveillance   - consider BOB/BLE art duplex in future     LIPID MANAGEMENT  Qualifies for Statin Therapy Based on 2018 ACC/AHA Guidelines: yes, Secondary Prevention - Very high risk ASCVD - 2 major events or 1 event with other high-risk conditions  10-yr ASCVD risk score: The 10-year ASCVD risk score (Suhas DK, et al., 2019) is: 21.5%, >20% \"high risk\"   Major ASCVD events: bilat iliac art aneurysms    High-risk conditions: >64yr old , Hypertension , and Current smoking   Currently on Statin: Yes  Tx goals: LDL-C <55   At goal?  no  Plan:   - increase atorva to 40mg daily   - start zetia 10mg daily      BLOOD PRESSURE MANAGEMENT  Office BP Goal ACC/AHA (2017) goal <130/80  Home BP at goal:  no  Office BP at goal:  no  24h ABPM:  not ordered to date   RDN candidate? YES  Contributing factors: tobacco   Plan:   - start/continue home BP monitoring, reviewed correct technique, provide BP log and instructions  Medications:  - start telmisartan 20mg daily     GLYCEMIC MANAGEMENT Prediabetic, IFG only  Lab Results   Component Value Date    HBA1C 5.6 04/12/2024    Plan:  - continue healthy diet, " weight reduction, daily physical activity   - consider metformin initiation up to 850mg BID if A1c 6.2+ in future to reduce T2D progression  - monitor labs Q6-12mo     ANTITHROMBOTIC THERAPY: yes  - continue ASA 81mg daily     LIFESTYLE INTERVENTIONS  TOBACCO: Stages of Change: Contemplative (intention to change in next 6 months ) no prior quit attemptos    reports that he has been smoking cigarettes. He has a 21.5 pack-year smoking history. He has never used smokeless tobacco.   Provided strong recommendation for complete cessation and informed this is the primary contributor to the majority of all ASCVD and cancer-related conditions and can result in significant morbidity and early mortality.   - reviewed resources for cessation including tobacco cessation clinic visit, pharmacotx meds, quit lines  - review at every visit   - choose quit date, consider NRT, trial and failed chantix in the past   Provided 8 minutes of face-to-face counseling on above topics     PHYSICAL ACTIVITY: >150min/week of mod-intensity activity or as much as tolerated  NUTRITION: Mediterranean, reduce Na to 1500mg/day, and - handout provided   ETOH: limit to 2 or less standard drinks/day   WT MGMT: maintain healthy weight (reduction 1kg = reduction 1mmHg BP)        OTHER:     # thrombocytopenia - unclear etiology, mild, no hx of spont bleeding   - stable over last 2 yrs, remainder of CBC wnl   Plan  - ok to continue ASA   - monitor CBC Q6-12mo    Studies to Be Obtained: CTA a/p - ordered, RN to track   Labs to Be Obtained: as noted in assessment     Follow up in: 2 months    Kike Ward M.D.   [facility]  [facility]  [phone]    Cc:

## 2024-05-28 ENCOUNTER — HOSPITAL ENCOUNTER (OUTPATIENT)
Dept: RADIOLOGY | Facility: MEDICAL CENTER | Age: 69
End: 2024-05-28
Attending: FAMILY MEDICINE
Payer: MEDICARE

## 2024-05-28 DIAGNOSIS — I72.3 ILIAC ARTERY ANEURYSM, BILATERAL (HCC): ICD-10-CM

## 2024-05-28 DIAGNOSIS — I73.9 PAD (PERIPHERAL ARTERY DISEASE) (HCC): ICD-10-CM

## 2024-05-28 RX ADMIN — IOHEXOL 100 ML: 350 INJECTION, SOLUTION INTRAVENOUS at 13:30

## 2024-05-29 ENCOUNTER — TELEPHONE (OUTPATIENT)
Dept: VASCULAR LAB | Facility: MEDICAL CENTER | Age: 69
End: 2024-05-29
Payer: MEDICARE

## 2024-05-29 NOTE — TELEPHONE ENCOUNTER
CTA abd/pelvis reviewed showing bilat iliac arteries measure 1.6cm, smaller than prior duplex findings in the past.  CTA is more specific/accurate, so we presume this to be the true sizing.  Generally <1.85cm in male is considered normal, so this would indicate normal iliac artery sizes.    Incidental liver findings noted and should be reviewed with PCP to determine next steps    MA - please contact to inform, CTA shows normal sized iliac arteries compared to prior.  He should follow-up with his PCP (I've cc'd to this message) to review the incidental liver findings for further assessment.     RN - please track for repeat Ao/iliac duplex in 1yr (5/2025) as a method to confirm stability of sizing over time.

## 2024-05-29 NOTE — TELEPHONE ENCOUNTER
Patient CT showed incidental abnormal findings in liver.  Please call patient to schedule sooner appointment with me to discuss about these findings and further testing.

## 2024-05-30 NOTE — TELEPHONE ENCOUNTER
Spoke with patient to relay below message from Dr. Ward. Patient states understanding and does not have any questions or concerns at this time.   Patient will follow up with pcp 6/4 to discuss other findings.         Marilu Ahn, Medical Assistant   [facility]   Ph: [phone]  Fx: [phone]

## 2024-05-31 ENCOUNTER — OFFICE VISIT (OUTPATIENT)
Dept: MEDICAL GROUP | Facility: MEDICAL CENTER | Age: 69
End: 2024-05-31
Payer: MEDICARE

## 2024-05-31 VITALS
HEIGHT: 68 IN | DIASTOLIC BLOOD PRESSURE: 82 MMHG | TEMPERATURE: 98.5 F | RESPIRATION RATE: 19 BRPM | HEART RATE: 60 BPM | OXYGEN SATURATION: 96 % | BODY MASS INDEX: 30.23 KG/M2 | SYSTOLIC BLOOD PRESSURE: 124 MMHG

## 2024-05-31 DIAGNOSIS — N28.1 RENAL CYST: ICD-10-CM

## 2024-05-31 DIAGNOSIS — N40.0 PROSTATE HYPERTROPHY: ICD-10-CM

## 2024-05-31 DIAGNOSIS — N32.89 BLADDER WALL THICKENING: ICD-10-CM

## 2024-05-31 DIAGNOSIS — K76.9 LIVER LESION: ICD-10-CM

## 2024-05-31 RX ORDER — FINASTERIDE 5 MG/1
5 TABLET, FILM COATED ORAL DAILY
Qty: 90 TABLET | Refills: 3 | Status: SHIPPED | OUTPATIENT
Start: 2024-05-31

## 2024-05-31 ASSESSMENT — ENCOUNTER SYMPTOMS
PALPITATIONS: 0
FEVER: 0
CHILLS: 0

## 2024-05-31 NOTE — PROGRESS NOTES
Verbal consent was acquired by the patient to use [employer] listening note generation during this visit.      Gume was seen today for follow-up.    Diagnoses and all orders for this visit:    Liver lesion  -     MR-ABDOMEN-WITH & W/O; Future  -     Referral to Gastroenterology    Renal cyst    Prostate hypertrophy  -     finasteride (PROSCAR) 5 MG Tab; Take 1 Tablet by mouth every day.    Bladder wall thickening  -     finasteride (PROSCAR) 5 MG Tab; Take 1 Tablet by mouth every day.                  Assessment & Plan  1. Liver lesion.  This is a new and unstable condition for the patient. A recent CT scan of the abdomen and pelvis revealed a 9 mm focus of arterial enhancement within the left hepatic dome, a 3 mm focus enhancement within the right hepatic dome, and a 2.1 cm focus of enhancement in the right hepatic dome. Consequently, an MRI of the abdomen with and without contrast is recommended for further evaluation. An order for an MRI with and without contrast has been placed. Should any abnormalities be detected, a consultation with a gastroenterologist will be recommended.    2. Renal cyst.  This is a new and unstable condition for the patient. A 1.47 mm simple left renal cyst was identified on the CT scan, which does not necessitate a follow-up.    3. Prostate hypertrophy.  This is a new and unstable condition for the patient. The patient will maintain his current regimen of silodosin and finasteride 5 mg daily will be added to his regimen.    4. Bladder wall thickening.  This is a new and unstable condition for the patient. This is likely a result of prostate hypertrophy. The patient will commence finasteride and continue with silodosin. A follow-up appointment with a urologist is recommended.    Follow-up  The patient is scheduled for a follow-up visit in 08/2024, or sooner if the MRI results are abnormal.          Chief complaint::Diagnoses of Liver lesion, Renal cyst, Prostate hypertrophy, and  Bladder wall thickening were pertinent to this visit.      History of Present Illness  The patient is a 68-year-old male here with his wife today to discuss about recent CT results that was done on 05/28/2024. He is accompanied by his wife.    The patient was referred to a vascular specialist for an iliac artery aneurysm, and a CT scan was performed. The CT scan revealed atherosclerosis in the aorta, but no aneurysm in aorta.     Three enhancing liver foci were discovered in the patient's liver. He expresses concern about the potential for surgical intervention if the MRI results are unsatisfactory. His wife queries the possibility of cancer and medication as a preventive measure against malignancy.    The patient reports urinary issues. He is currently on silodosin, which aids him urinate, albeit not consistently emptying his bladder. He experiences urinary urgency, particularly after washing dishes. He acknowledges inadequate hydration and has an upcoming appointment with a urologist in 08/2024. A CT scan revealed thickening of his bladder wall.    The patient has a history of left hip surgery, arthritis in his right hip and back, and has recently begun to experience discomfort in his right hip. He also reports arthritis in his other knee, neck, and multiple joints. Despite his arthritis, he maintains an active lifestyle, engaging in yard work.      Review of Systems   Constitutional:  Negative for chills and fever.   Cardiovascular:  Negative for chest pain, palpitations and leg swelling.          Medications and Allergies:     Current Outpatient Medications   Medication Sig Dispense Refill    finasteride (PROSCAR) 5 MG Tab Take 1 Tablet by mouth every day. 90 Tablet 3    coenzyme Q-10 30 MG capsule Take 30 mg by mouth every day.      telmisartan (MICARDIS) 20 MG tablet Take 1 Tablet by mouth every day. to lower blood pressure 100 Tablet 3    atorvastatin (LIPITOR) 40 MG Tab Take 1 Tablet by mouth every day. To  "lower cholesterol and heart disease risk 100 Tablet 3    ezetimibe (ZETIA) 10 MG Tab Take 1 Tablet by mouth every day. To lower cholesterol and heart disease risk 100 Tablet 3    aspirin 81 MG EC tablet Take 1 Tablet by mouth every day.      omeprazole (PRILOSEC) 20 MG delayed-release capsule TAKE 1 CAPSULE BY MOUTH EVERY DAY 90 Capsule 3    traZODone (DESYREL) 100 MG Tab Take 1 Tablet by mouth every evening. 90 Tablet 3    mupirocin (BACTROBAN) 2 % Ointment Apply 1 Application topically 3 times a day. 22 g 3    Silodosin 4 MG Cap Take 1 Tablet by mouth every day. 90 Capsule 3    clobetasol (TEMOVATE) 0.05 % Ointment APPLY TOPICALLY TO AFFECTED AREA(S) 2 TIMES A DAY AS NEEDED. APPLY TO KNEES AS NEEDED 60 g 3    zonisamide (ZONEGRAN) 100 MG Cap       Cholecalciferol ([employer]S VITAMIN D) 2000 UNIT Cap Take 1 Cap by mouth every bedtime. For vitamin D deficiency 100 Cap 3     No current facility-administered medications for this visit.       /82   Pulse 60   Temp 36.9 °C (98.5 °F) (Temporal)   Resp 19   Ht 1.727 m (5' 8\")   SpO2 96% , Body mass index is 30.23 kg/m².      Physical Exam  Constitutional:       Appearance: Normal appearance. He is well-developed and well-groomed.   HENT:      Head: Normocephalic and atraumatic.      Right Ear: External ear normal.      Left Ear: External ear normal.   Eyes:      General:         Right eye: No discharge.         Left eye: No discharge.      Conjunctiva/sclera: Conjunctivae normal.   Cardiovascular:      Rate and Rhythm: Normal rate.   Pulmonary:      Effort: Pulmonary effort is normal. No respiratory distress.   Musculoskeletal:      Cervical back: Neck supple.   Skin:     Findings: No rash.   Neurological:      Mental Status: He is alert.   Psychiatric:         Mood and Affect: Mood and affect normal.         Behavior: Behavior normal.            I reviewed with patient MRI results on 05/2024.        Please note that this dictation was created using voice recognition " software. I have made every reasonable attempt to correct obvious errors, but I expect that there are errors of grammar and possibly content that I did not discover before finalizing the note.

## 2024-06-04 ENCOUNTER — HOSPITAL ENCOUNTER (OUTPATIENT)
Dept: RADIOLOGY | Facility: MEDICAL CENTER | Age: 69
End: 2024-06-04
Attending: FAMILY MEDICINE
Payer: MEDICARE

## 2024-06-04 DIAGNOSIS — R41.3 MEMORY LOSS: ICD-10-CM

## 2024-06-04 PROCEDURE — 70551 MRI BRAIN STEM W/O DYE: CPT

## 2024-06-07 ENCOUNTER — HOSPITAL ENCOUNTER (OUTPATIENT)
Dept: LAB | Facility: MEDICAL CENTER | Age: 69
End: 2024-06-07
Attending: FAMILY MEDICINE
Payer: MEDICARE

## 2024-06-07 ENCOUNTER — HOSPITAL ENCOUNTER (OUTPATIENT)
Dept: LAB | Facility: MEDICAL CENTER | Age: 69
End: 2024-06-07
Attending: NURSE PRACTITIONER
Payer: MEDICARE

## 2024-06-07 DIAGNOSIS — I70.0 AORTIC ATHEROSCLEROSIS (HCC): ICD-10-CM

## 2024-06-07 DIAGNOSIS — I10 PRIMARY HYPERTENSION: ICD-10-CM

## 2024-06-07 LAB
APTT PPP: 32.9 SEC (ref 24.7–36)
BASOPHILS # BLD AUTO: 0.5 % (ref 0–1.8)
BASOPHILS # BLD: 0.04 K/UL (ref 0–0.12)
EOSINOPHIL # BLD AUTO: 0.36 K/UL (ref 0–0.51)
EOSINOPHIL NFR BLD: 4.3 % (ref 0–6.9)
ERYTHROCYTE [DISTWIDTH] IN BLOOD BY AUTOMATED COUNT: 44.1 FL (ref 35.9–50)
ERYTHROCYTE [DISTWIDTH] IN BLOOD BY AUTOMATED COUNT: 44.9 FL (ref 35.9–50)
HCT VFR BLD AUTO: 45.6 % (ref 42–52)
HCT VFR BLD AUTO: 45.9 % (ref 42–52)
HGB BLD-MCNC: 15.5 G/DL (ref 14–18)
HGB BLD-MCNC: 15.5 G/DL (ref 14–18)
IMM GRANULOCYTES # BLD AUTO: 0.02 K/UL (ref 0–0.11)
IMM GRANULOCYTES NFR BLD AUTO: 0.2 % (ref 0–0.9)
INR PPP: 1.21 (ref 0.87–1.13)
LYMPHOCYTES # BLD AUTO: 2.15 K/UL (ref 1–4.8)
LYMPHOCYTES NFR BLD: 25.9 % (ref 22–41)
MCH RBC QN AUTO: 30.5 PG (ref 27–33)
MCH RBC QN AUTO: 30.6 PG (ref 27–33)
MCHC RBC AUTO-ENTMCNC: 33.8 G/DL (ref 32.3–36.5)
MCHC RBC AUTO-ENTMCNC: 34 G/DL (ref 32.3–36.5)
MCV RBC AUTO: 89.8 FL (ref 81.4–97.8)
MCV RBC AUTO: 90.5 FL (ref 81.4–97.8)
MONOCYTES # BLD AUTO: 0.69 K/UL (ref 0–0.85)
MONOCYTES NFR BLD AUTO: 8.3 % (ref 0–13.4)
NEUTROPHILS # BLD AUTO: 5.05 K/UL (ref 1.82–7.42)
NEUTROPHILS NFR BLD: 60.8 % (ref 44–72)
NRBC # BLD AUTO: 0 K/UL
NRBC BLD-RTO: 0 /100 WBC (ref 0–0.2)
PLATELET # BLD AUTO: 133 K/UL (ref 164–446)
PLATELET # BLD AUTO: 135 K/UL (ref 164–446)
PMV BLD AUTO: 12.1 FL (ref 9–12.9)
PMV BLD AUTO: 12.2 FL (ref 9–12.9)
PROTHROMBIN TIME: 15.4 SEC (ref 12–14.6)
RBC # BLD AUTO: 5.07 M/UL (ref 4.7–6.1)
RBC # BLD AUTO: 5.08 M/UL (ref 4.7–6.1)
WBC # BLD AUTO: 8.1 K/UL (ref 4.8–10.8)
WBC # BLD AUTO: 8.3 K/UL (ref 4.8–10.8)

## 2024-06-07 PROCEDURE — 86141 C-REACTIVE PROTEIN HS: CPT

## 2024-06-07 PROCEDURE — 85730 THROMBOPLASTIN TIME PARTIAL: CPT | Mod: GA

## 2024-06-07 PROCEDURE — 82570 ASSAY OF URINE CREATININE: CPT

## 2024-06-07 PROCEDURE — 36415 COLL VENOUS BLD VENIPUNCTURE: CPT

## 2024-06-07 PROCEDURE — 82043 UR ALBUMIN QUANTITATIVE: CPT

## 2024-06-07 PROCEDURE — 85025 COMPLETE CBC W/AUTO DIFF WBC: CPT

## 2024-06-07 PROCEDURE — 85610 PROTHROMBIN TIME: CPT | Mod: GA

## 2024-06-07 PROCEDURE — 85027 COMPLETE CBC AUTOMATED: CPT

## 2024-06-07 PROCEDURE — 80053 COMPREHEN METABOLIC PANEL: CPT

## 2024-06-07 PROCEDURE — 83695 ASSAY OF LIPOPROTEIN(A): CPT

## 2024-06-07 PROCEDURE — 80061 LIPID PANEL: CPT

## 2024-06-08 LAB
ALBUMIN SERPL BCP-MCNC: 4.3 G/DL (ref 3.2–4.9)
ALBUMIN/GLOB SERPL: 1.7 G/DL
ALP SERPL-CCNC: 94 U/L (ref 30–99)
ALT SERPL-CCNC: 19 U/L (ref 2–50)
ANION GAP SERPL CALC-SCNC: 12 MMOL/L (ref 7–16)
AST SERPL-CCNC: 17 U/L (ref 12–45)
BILIRUB SERPL-MCNC: 0.7 MG/DL (ref 0.1–1.5)
BUN SERPL-MCNC: 16 MG/DL (ref 8–22)
CALCIUM ALBUM COR SERPL-MCNC: 8.8 MG/DL (ref 8.5–10.5)
CALCIUM SERPL-MCNC: 9 MG/DL (ref 8.5–10.5)
CHLORIDE SERPL-SCNC: 107 MMOL/L (ref 96–112)
CHOLEST SERPL-MCNC: 94 MG/DL (ref 100–199)
CO2 SERPL-SCNC: 20 MMOL/L (ref 20–33)
CREAT SERPL-MCNC: 0.82 MG/DL (ref 0.5–1.4)
CREAT UR-MCNC: 178.4 MG/DL
CRP SERPL HS-MCNC: 1.4 MG/L (ref 0–3)
FASTING STATUS PATIENT QL REPORTED: NORMAL
GFR SERPLBLD CREATININE-BSD FMLA CKD-EPI: 95 ML/MIN/1.73 M 2
GLOBULIN SER CALC-MCNC: 2.6 G/DL (ref 1.9–3.5)
GLUCOSE SERPL-MCNC: 103 MG/DL (ref 65–99)
HDLC SERPL-MCNC: 45 MG/DL
LDLC SERPL CALC-MCNC: 36 MG/DL
MICROALBUMIN UR-MCNC: <1.2 MG/DL
MICROALBUMIN/CREAT UR: NORMAL MG/G (ref 0–30)
POTASSIUM SERPL-SCNC: 3.8 MMOL/L (ref 3.6–5.5)
PROT SERPL-MCNC: 6.9 G/DL (ref 6–8.2)
SODIUM SERPL-SCNC: 139 MMOL/L (ref 135–145)
TRIGL SERPL-MCNC: 63 MG/DL (ref 0–149)

## 2024-06-10 LAB — LPA SERPL-MCNC: <6 MG/DL

## 2024-06-13 ENCOUNTER — OFFICE VISIT (OUTPATIENT)
Dept: CARDIOLOGY | Facility: PHYSICIAN GROUP | Age: 69
End: 2024-06-13
Payer: MEDICARE

## 2024-06-13 VITALS
SYSTOLIC BLOOD PRESSURE: 110 MMHG | RESPIRATION RATE: 14 BRPM | OXYGEN SATURATION: 97 % | HEIGHT: 68 IN | DIASTOLIC BLOOD PRESSURE: 60 MMHG | WEIGHT: 195.2 LBS | BODY MASS INDEX: 29.58 KG/M2 | HEART RATE: 63 BPM

## 2024-06-13 DIAGNOSIS — I25.10 CORONARY ARTERY CALCIFICATION SEEN ON CT SCAN: ICD-10-CM

## 2024-06-13 DIAGNOSIS — F17.210 NICOTINE DEPENDENCE, CIGARETTES, UNCOMPLICATED: ICD-10-CM

## 2024-06-13 DIAGNOSIS — E78.2 MIXED HYPERLIPIDEMIA: ICD-10-CM

## 2024-06-13 DIAGNOSIS — I72.3 ILIAC ARTERY ANEURYSM (HCC): ICD-10-CM

## 2024-06-13 DIAGNOSIS — I35.1 MILD AORTIC INSUFFICIENCY: ICD-10-CM

## 2024-06-13 DIAGNOSIS — K76.9 LIVER LESION: ICD-10-CM

## 2024-06-13 DIAGNOSIS — I10 PRIMARY HYPERTENSION: ICD-10-CM

## 2024-06-13 DIAGNOSIS — I70.0 AORTIC ATHEROSCLEROSIS (HCC): ICD-10-CM

## 2024-06-13 PROCEDURE — 3074F SYST BP LT 130 MM HG: CPT | Performed by: NURSE PRACTITIONER

## 2024-06-13 PROCEDURE — 3078F DIAST BP <80 MM HG: CPT | Performed by: NURSE PRACTITIONER

## 2024-06-13 PROCEDURE — 99214 OFFICE O/P EST MOD 30 MIN: CPT | Performed by: NURSE PRACTITIONER

## 2024-06-13 RX ORDER — ATORVASTATIN CALCIUM 40 MG/1
40 TABLET, FILM COATED ORAL DAILY
Qty: 100 TABLET | Refills: 3 | Status: SHIPPED | OUTPATIENT
Start: 2024-06-13

## 2024-06-13 RX ORDER — EZETIMIBE 10 MG/1
10 TABLET ORAL DAILY
Qty: 100 TABLET | Refills: 3 | Status: SHIPPED | OUTPATIENT
Start: 2024-06-13

## 2024-06-13 RX ORDER — TELMISARTAN 20 MG/1
20 TABLET ORAL DAILY
Qty: 100 TABLET | Refills: 3 | Status: SHIPPED | OUTPATIENT
Start: 2024-06-13

## 2024-06-13 ASSESSMENT — ENCOUNTER SYMPTOMS
NAUSEA: 0
ABDOMINAL PAIN: 0
CHILLS: 0
SHORTNESS OF BREATH: 0
COUGH: 0
FEVER: 0
LOSS OF CONSCIOUSNESS: 0
BRUISES/BLEEDS EASILY: 0
DIZZINESS: 0
PALPITATIONS: 0
MYALGIAS: 0
ORTHOPNEA: 0
PND: 0
HEADACHES: 0
INSOMNIA: 1

## 2024-06-13 ASSESSMENT — FIBROSIS 4 INDEX: FIB4 SCORE: 1.96

## 2024-06-13 NOTE — PROGRESS NOTES
Chief Complaint   Patient presents with    Hypertension     Essential hypertension, benign      Hyperlipidemia     Mixed hyperlipidemia       Subjective     Gume Wing is a 68 y.o. male who presents today for annual follow-up of HTN, hyperlipidemia/aortic atherosclerosis, mild iliac aneurysm, mild AR, GERD and tobacco use.    Gume is 68 year male with history of hypertension, mild iliac aneurysm,hyperlipidemia/aortic atherosclerosis, mild AR, GERD and tobacco use, last seen by me in June 2023.     He has been working hard on losing weight over the last couple of years, and he is down 100+ pounds, which he has maintained.    He did recently have a CTA of his abdomen and pelvis; iliac arteries were stable, but there was a cyst on his liver, and he is set up to have an MRI of the abdomen done, for further evaluation.     He is still smoking, 2 packs lasts him about a week.     He is here today for annual follow-up. He remains active (walks, bikes) and denies any exercise symptoms: no chest pain, pressure, tightness or discomfort; no symptomatic palpitations; no shortness of breath, orthopnea or PND; no dizziness, lightheadedness or syncope; no LE edema. BP is very well controlled.    Past Medical History:   Diagnosis Date    Anesthesia     Occasionally wakes up agitated/ anxious    Arthritis     Atypical pigmented skin lesion     Carpal tunnel syndrome     Bilateral, uncontrolled    Contact dermatitis and other eczema, due to unspecified cause     Chronic, controlled with meds    Elevated PSA, less than 10 ng/ml     Epididymitis     Chronic on left    Esophageal reflux     Loss of height     Loss of 1.5 inches 2009,    Migraine     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     Quit in 2009, then restarted    Mixed hyperlipidemia     Osteoarthrosis involving, or with mention of more than one site, but not specified as generalized, multiple sites     Prediabetes     uncontrolled    Seborrheic keratoses, inflamed      Seizure (HCC) 2013    Seizure is visual changes only, none since medication started in 2013    Short-term memory loss     Snoring     Thrombocytopenia (HCC)     Tinnitus     Chronic with hearing loss    Unspecified vitamin D deficiency      Past Surgical History:   Procedure Laterality Date    PB RECONSTR TOTAL SHOULDER IMPLANT Left 12/31/2020    Procedure: ARTHROPLASTY, SHOULDER, TOTAL;  Surgeon: Ric Cooper M.D.;  Location: SURGERY [facility];  Service: Orthopedics    BICEPS TENODESIS Left 12/31/2020    Procedure: TENODESIS, BICEPS;  Surgeon: Ric Cooper M.D.;  Location: SURGERY [facility];  Service: Orthopedics    KNEE ARTHROPLASTY TOTAL  9/15/2014    Performed by Cesar Abreu M.D. at SURGERY [facility] ORS    KNEE ARTHROPLASTY TOTAL  3/7/2011    right    CARPAL TUNNEL ENDOSCOPIC  12/1/2009    Performed by RONNIE MAURICIO at SURGERY [facility] ORS    CERVICAL DISK AND FUSION ANTERIOR  1/26/2009    Performed by AMINA NERI at SURGERY [facility] ORS    TOENAIL REMOVAL  2008    bilateral great toes    ARTHROSCOPY, KNEE  1989    right    HEMORRHOIDECTOMY  1980    VASECTOMY  1979     Family History   Problem Relation Age of Onset    Cancer Mother     Lung Cancer Mother     Breast Cancer Daughter     Lung Disease Other      Social History     Socioeconomic History    Marital status:      Spouse name: Not on file    Number of children: Not on file    Years of education: Not on file    Highest education level: Not on file   Occupational History    Not on file   Tobacco Use    Smoking status: Every Day     Current packs/day: 0.50     Average packs/day: 0.5 packs/day for 43.0 years (21.5 ttl pk-yrs)     Types: Cigarettes    Smokeless tobacco: Never    Tobacco comments:     5/21/24 starting to quit 5sig yesterday    Vaping Use    Vaping status: Never Used   Substance and Sexual Activity    Alcohol use: Not Currently     Alcohol/week: 0.0 oz     Comment: rare    Drug use: No     Sexual activity: Yes     Partners: Female     Birth control/protection: Post-Menopausal     Comment: , 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[employer]   Other Topics Concern     Service Not Asked    Blood Transfusions Not Asked    Caffeine Concern Not Asked    Occupational Exposure Not Asked    Hobby Hazards Not Asked    Sleep Concern Not Asked    Stress Concern Not Asked    Weight Concern Not Asked    Special Diet Not Asked    Back Care Not Asked    Exercise No    Bike Helmet Not Asked    Seat Belt Not Asked    Self-Exams Not Asked   Social History Narrative    , 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    Other Environmental      pollen     Outpatient Encounter Medications as of 6/13/2024   Medication Sig Dispense Refill    atorvastatin (LIPITOR) 40 MG Tab Take 1 Tablet by mouth every day. To lower cholesterol and heart disease risk 100 Tablet 3    telmisartan (MICARDIS) 20 MG tablet Take 1 Tablet by mouth every day. to lower blood pressure 100 Tablet 3    ezetimibe (ZETIA) 10 MG Tab Take 1 Tablet by mouth every day. To lower cholesterol and heart disease risk 100 Tablet 3    finasteride (PROSCAR) 5 MG Tab Take 1 Tablet by mouth every day. 90 Tablet 3    coenzyme Q-10 30 MG capsule Take 30 mg by mouth every day.      aspirin 81 MG EC tablet Take 1 Tablet by mouth every day.      omeprazole (PRILOSEC) 20 MG delayed-release capsule TAKE 1 CAPSULE BY MOUTH EVERY DAY 90 Capsule 3    traZODone (DESYREL) 100 MG Tab Take 1 Tablet by mouth every evening. 90 Tablet 3    mupirocin (BACTROBAN) 2 % Ointment Apply  "1 Application topically 3 times a day. 22 g 3    Silodosin 4 MG Cap Take 1 Tablet by mouth every day. 90 Capsule 3    clobetasol (TEMOVATE) 0.05 % Ointment APPLY TOPICALLY TO AFFECTED AREA(S) 2 TIMES A DAY AS NEEDED. APPLY TO KNEES AS NEEDED 60 g 3    zonisamide (ZONEGRAN) 100 MG Cap       Cholecalciferol ([employer] VITAMIN D) 2000 UNIT Cap Take 1 Cap by mouth every bedtime. For vitamin D deficiency 100 Cap 3    [DISCONTINUED] telmisartan (MICARDIS) 20 MG tablet Take 1 Tablet by mouth every day. to lower blood pressure 100 Tablet 3    [DISCONTINUED] atorvastatin (LIPITOR) 40 MG Tab Take 1 Tablet by mouth every day. To lower cholesterol and heart disease risk 100 Tablet 3    [DISCONTINUED] ezetimibe (ZETIA) 10 MG Tab Take 1 Tablet by mouth every day. To lower cholesterol and heart disease risk 100 Tablet 3     No facility-administered encounter medications on file as of 6/13/2024.     Review of Systems   Constitutional:  Negative for chills and fever.   HENT:  Negative for congestion.    Respiratory:  Negative for cough and shortness of breath.    Cardiovascular:  Negative for chest pain, palpitations, orthopnea, leg swelling and PND.   Gastrointestinal:  Negative for abdominal pain and nausea.   Musculoskeletal:  Positive for joint pain. Negative for myalgias.   Skin:  Negative for rash.   Neurological:  Negative for dizziness, loss of consciousness and headaches.   Endo/Heme/Allergies:  Does not bruise/bleed easily.   Psychiatric/Behavioral:  The patient has insomnia.               Objective     /60 (BP Location: Left arm, Patient Position: Sitting, BP Cuff Size: Adult)   Pulse 63   Resp 14   Ht 1.727 m (5' 8\")   Wt 88.5 kg (195 lb 3.2 oz)   SpO2 97%   BMI 29.68 kg/m²     Physical Exam  Constitutional:       Appearance: He is well-developed.   HENT:      Head: Normocephalic.   Neck:      Vascular: No JVD.   Cardiovascular:      Rate and Rhythm: Normal rate and regular rhythm.      Heart sounds: Normal heart " sounds.   Pulmonary:      Effort: Pulmonary effort is normal. No respiratory distress.      Breath sounds: Normal breath sounds. No wheezing or rales.   Abdominal:      General: Bowel sounds are normal. There is no distension.      Palpations: Abdomen is soft.      Tenderness: There is no abdominal tenderness.   Musculoskeletal:         General: Normal range of motion.      Cervical back: Normal range of motion and neck supple.   Skin:     General: Skin is warm and dry.      Findings: No rash.   Neurological:      Mental Status: He is alert and oriented to person, place, and time.   Psychiatric:         Mood and Affect: Mood normal.         Behavior: Behavior normal.       IMPRESSION OF CTA OF PELVIS/ABDOMEN OF 5/29/2024:  1. Bilateral common iliac arteries are ectatic, 1.6 cm in diameter. Aorta is nonaneurysmal.  2. There are three indeterminate arterial enhancing foci within the liver. Largest measures 2.1 cm. Recommend MRI abdomen with/without contrast for further evaluation.  3. 1.47 m simple left renal cyst, which does not require follow-up.  4. Mild generalized bladder wall thickening, correlate for hypertrophy or cystitis.  5. Prostate is enlarged, 6.7 cm.    IMPRESSION OF ABDOMINAL US OF 5/11/2023:  1.  No evidence of abdominal aortic aneurysm.  2.  Ectasia of the bilateral common iliac arteries.  3.  Moderate atherosclerotic plaque.    IMPRESSION OF CT SCAN OF CHEST OF 1/21/2023:  1.  Stable calcified granuloma in the right upper lobe, benign.  2.  No new pulmonary nodules or masses.     CONCLUSIONS OF TTE OF 6/14/2021:  Left ventricular ejection fraction is visually estimated to be 65%.  Mildly dilated lefft atrium.  Mild aortic insufficiency.  Estimated right ventricular systolic pressure is 33 mmHg.  No prior study is available for comparison.      Lab Results   Component Value Date/Time    CHOLSTRLTOT 94 (L) 06/07/2024 07:45 AM    LDL 36 06/07/2024 07:45 AM    HDL 45 06/07/2024 07:45 AM    TRIGLYCERIDE  63 06/07/2024 07:45 AM        Lab Results   Component Value Date/Time    ASTSGOT 17 06/07/2024 07:45 AM    ALTSGPT 19 06/07/2024 07:45 AM       Lab Results   Component Value Date/Time    SODIUM 139 06/07/2024 07:45 AM    POTASSIUM 3.8 06/07/2024 07:45 AM    CHLORIDE 107 06/07/2024 07:45 AM    CO2 20 06/07/2024 07:45 AM    GLUCOSE 103 (H) 06/07/2024 07:45 AM    BUN 16 06/07/2024 07:45 AM    CREATININE 0.82 06/07/2024 07:45 AM    GLOMRATE 89 06/27/2022 10:32 AM        Assessment & Plan     1. Primary hypertension  telmisartan (MICARDIS) 20 MG tablet      2. Iliac artery aneurysm (HCC)        3. Mixed hyperlipidemia  atorvastatin (LIPITOR) 40 MG Tab    ezetimibe (ZETIA) 10 MG Tab      4. Coronary artery calcification seen on CT scan        5. Aortic atherosclerosis (HCC)        6. Mild aortic insufficiency        7. Nicotine dependence, uncomplicated (Current Smoker)        8. Liver lesion            Medical Decision Making: Today's Assessment/Status/Plan:      1. Hypertension, treated with Micardis 20mg once daily. BP is very well controlled.  2. History of iliac aneurysm, 1.6cm on recent CTA, stable. He knows to keep BP well controlled.  3. Hyperlipidemia/aortic atherosclerosis, on Lipitor 40mg and Zetia 10mg once daily. Last lipid panel was excellent.  4. Mild AR, on echo in 2021; will repeat echo next year. No shortness of breath or LE edema.  5. Tobacco use, ongoing, but he has cut down. He is encouraged to keep working on quitting. He states understanding.  6. Liver lesion, with MRI pending.    BP and lipids are well controlled.  Same medications, which are all renewed under my name.  We reviewed CTA results.  Keep follow-up with other providers.    Keep working on positive dietary changes, and cutting down further on smoking.    Follow-up annually, sooner if clinical condition changes.  We will repeat imaging at that time.

## 2024-06-14 ENCOUNTER — HOSPITAL ENCOUNTER (OUTPATIENT)
Facility: MEDICAL CENTER | Age: 69
End: 2024-06-14
Attending: RADIOLOGY
Payer: MEDICARE

## 2024-06-14 ENCOUNTER — HOSPITAL ENCOUNTER (OUTPATIENT)
Dept: RADIOLOGY | Facility: MEDICAL CENTER | Age: 69
End: 2024-06-14
Attending: NURSE PRACTITIONER
Payer: MEDICARE

## 2024-06-14 DIAGNOSIS — G40.209 COMPLEX PARTIAL SEIZURES WITH CONSCIOUSNESS IMPAIRED (HCC): ICD-10-CM

## 2024-06-14 DIAGNOSIS — G31.84 MILD COGNITIVE IMPAIRMENT: ICD-10-CM

## 2024-06-14 DIAGNOSIS — H53.30 BINOCULAR VISION DISORDER: ICD-10-CM

## 2024-06-14 LAB
BURR CELLS/RBC NFR CSF MANUAL: 0 %
CLARITY CSF: CLEAR
COLOR CSF: COLORLESS
COLOR SPUN CSF: COLORLESS
CSF COMMENTS 1658: NORMAL
GLUCOSE CSF-MCNC: 62 MG/DL (ref 40–80)
NUC CELL # CSF: 2 CELLS/UL (ref 0–10)
PROT CSF-MCNC: 41 MG/DL (ref 15–45)
RBC # CSF: 14 CELLS/UL
SPECIMEN VOL CSF: 14 ML
TUBE # CSF: 3
TUBE # CSF: NORMAL

## 2024-06-14 PROCEDURE — 89051 BODY FLUID CELL COUNT: CPT

## 2024-06-14 PROCEDURE — 83916 OLIGOCLONAL BANDS: CPT

## 2024-06-14 PROCEDURE — 62328 DX LMBR SPI PNXR W/FLUOR/CT: CPT

## 2024-06-14 PROCEDURE — 84157 ASSAY OF PROTEIN OTHER: CPT

## 2024-06-14 PROCEDURE — 83520 IMMUNOASSAY QUANT NOS NONAB: CPT

## 2024-06-14 PROCEDURE — 82945 GLUCOSE OTHER FLUID: CPT

## 2024-06-18 LAB
OLIGOCLONAL BANDS CSF ELPH-IMP: NORMAL
OLIGOCLONAL BANDS CSF IEF: 0 BANDS (ref 0–1)
OLIGOCLONAL BANDS.IT SER+CSF QL: NEGATIVE

## 2024-06-29 DIAGNOSIS — G47.09 OTHER INSOMNIA: ICD-10-CM

## 2024-07-01 DIAGNOSIS — E78.2 MIXED HYPERLIPIDEMIA: ICD-10-CM

## 2024-07-01 RX ORDER — ATORVASTATIN CALCIUM 10 MG/1
10 TABLET, FILM COATED ORAL
Qty: 100 TABLET | Refills: 3 | OUTPATIENT
Start: 2024-07-01

## 2024-07-01 RX ORDER — TRAZODONE HYDROCHLORIDE 100 MG/1
100 TABLET ORAL EVERY EVENING
Qty: 90 TABLET | Refills: 3 | Status: SHIPPED | OUTPATIENT
Start: 2024-07-01

## 2024-07-10 LAB — MISCELLANEOUS LAB RESULT MISCLAB: NORMAL

## 2024-07-24 ENCOUNTER — HOSPITAL ENCOUNTER (OUTPATIENT)
Dept: RADIOLOGY | Facility: MEDICAL CENTER | Age: 69
End: 2024-07-24
Attending: FAMILY MEDICINE
Payer: MEDICARE

## 2024-07-24 DIAGNOSIS — K76.9 LIVER LESION: ICD-10-CM

## 2024-07-24 PROCEDURE — 74183 MRI ABD W/O CNTR FLWD CNTR: CPT

## 2024-07-24 PROCEDURE — A9581 GADOXETATE DISODIUM INJ: HCPCS | Mod: JZ | Performed by: FAMILY MEDICINE

## 2024-07-24 PROCEDURE — 700117 HCHG RX CONTRAST REV CODE 255: Mod: JZ | Performed by: FAMILY MEDICINE

## 2024-07-24 RX ADMIN — GADOXETATE DISODIUM 10 ML: 181.43 INJECTION, SOLUTION INTRAVENOUS at 09:56

## 2024-07-25 DIAGNOSIS — R97.20 ELEVATED PSA, LESS THAN 10 NG/ML: ICD-10-CM

## 2024-07-25 RX ORDER — SILODOSIN 4 MG/1
1 CAPSULE ORAL
Qty: 90 CAPSULE | Refills: 3 | Status: SHIPPED | OUTPATIENT
Start: 2024-07-25

## 2024-08-08 ENCOUNTER — HOSPITAL ENCOUNTER (OUTPATIENT)
Dept: LAB | Facility: MEDICAL CENTER | Age: 69
End: 2024-08-08
Attending: FAMILY MEDICINE
Payer: MEDICARE

## 2024-08-08 DIAGNOSIS — D69.6 THROMBOCYTOPENIA (HCC): ICD-10-CM

## 2024-08-08 DIAGNOSIS — R73.03 PREDIABETES: ICD-10-CM

## 2024-08-08 DIAGNOSIS — I70.0 AORTIC ATHEROSCLEROSIS (HCC): ICD-10-CM

## 2024-08-08 LAB
ALBUMIN SERPL BCP-MCNC: 4.1 G/DL (ref 3.2–4.9)
ALBUMIN/GLOB SERPL: 1.8 G/DL
ALP SERPL-CCNC: 95 U/L (ref 30–99)
ALT SERPL-CCNC: 13 U/L (ref 2–50)
ANION GAP SERPL CALC-SCNC: 9 MMOL/L (ref 7–16)
AST SERPL-CCNC: 14 U/L (ref 12–45)
BASOPHILS # BLD AUTO: 0.4 % (ref 0–1.8)
BASOPHILS # BLD: 0.03 K/UL (ref 0–0.12)
BILIRUB SERPL-MCNC: 0.4 MG/DL (ref 0.1–1.5)
BUN SERPL-MCNC: 15 MG/DL (ref 8–22)
CALCIUM ALBUM COR SERPL-MCNC: 8.9 MG/DL (ref 8.5–10.5)
CALCIUM SERPL-MCNC: 9 MG/DL (ref 8.5–10.5)
CHLORIDE SERPL-SCNC: 109 MMOL/L (ref 96–112)
CHOLEST SERPL-MCNC: 93 MG/DL (ref 100–199)
CO2 SERPL-SCNC: 22 MMOL/L (ref 20–33)
CREAT SERPL-MCNC: 0.76 MG/DL (ref 0.5–1.4)
EOSINOPHIL # BLD AUTO: 0.31 K/UL (ref 0–0.51)
EOSINOPHIL NFR BLD: 3.8 % (ref 0–6.9)
ERYTHROCYTE [DISTWIDTH] IN BLOOD BY AUTOMATED COUNT: 45.4 FL (ref 35.9–50)
EST. AVERAGE GLUCOSE BLD GHB EST-MCNC: 117 MG/DL
FASTING STATUS PATIENT QL REPORTED: NORMAL
GFR SERPLBLD CREATININE-BSD FMLA CKD-EPI: 97 ML/MIN/1.73 M 2
GLOBULIN SER CALC-MCNC: 2.3 G/DL (ref 1.9–3.5)
GLUCOSE SERPL-MCNC: 113 MG/DL (ref 65–99)
HBA1C MFR BLD: 5.7 % (ref 4–5.6)
HCT VFR BLD AUTO: 41.8 % (ref 42–52)
HDLC SERPL-MCNC: 42 MG/DL
HGB BLD-MCNC: 14.1 G/DL (ref 14–18)
IMM GRANULOCYTES # BLD AUTO: 0.07 K/UL (ref 0–0.11)
IMM GRANULOCYTES NFR BLD AUTO: 0.9 % (ref 0–0.9)
LDLC SERPL CALC-MCNC: 42 MG/DL
LYMPHOCYTES # BLD AUTO: 2.04 K/UL (ref 1–4.8)
LYMPHOCYTES NFR BLD: 24.9 % (ref 22–41)
MCH RBC QN AUTO: 30.9 PG (ref 27–33)
MCHC RBC AUTO-ENTMCNC: 33.7 G/DL (ref 32.3–36.5)
MCV RBC AUTO: 91.5 FL (ref 81.4–97.8)
MONOCYTES # BLD AUTO: 0.63 K/UL (ref 0–0.85)
MONOCYTES NFR BLD AUTO: 7.7 % (ref 0–13.4)
NEUTROPHILS # BLD AUTO: 5.12 K/UL (ref 1.82–7.42)
NEUTROPHILS NFR BLD: 62.3 % (ref 44–72)
NRBC # BLD AUTO: 0 K/UL
NRBC BLD-RTO: 0 /100 WBC (ref 0–0.2)
PLATELET # BLD AUTO: 112 K/UL (ref 164–446)
PMV BLD AUTO: 12.2 FL (ref 9–12.9)
POTASSIUM SERPL-SCNC: 4.2 MMOL/L (ref 3.6–5.5)
PROT SERPL-MCNC: 6.4 G/DL (ref 6–8.2)
RBC # BLD AUTO: 4.57 M/UL (ref 4.7–6.1)
SODIUM SERPL-SCNC: 140 MMOL/L (ref 135–145)
TRIGL SERPL-MCNC: 43 MG/DL (ref 0–149)
WBC # BLD AUTO: 8.2 K/UL (ref 4.8–10.8)

## 2024-08-08 PROCEDURE — 85025 COMPLETE CBC W/AUTO DIFF WBC: CPT

## 2024-08-08 PROCEDURE — 80053 COMPREHEN METABOLIC PANEL: CPT

## 2024-08-08 PROCEDURE — 80061 LIPID PANEL: CPT

## 2024-08-08 PROCEDURE — 36415 COLL VENOUS BLD VENIPUNCTURE: CPT

## 2024-08-08 PROCEDURE — 83036 HEMOGLOBIN GLYCOSYLATED A1C: CPT | Mod: GA

## 2024-08-20 ENCOUNTER — HOSPITAL ENCOUNTER (OUTPATIENT)
Dept: LAB | Facility: MEDICAL CENTER | Age: 69
End: 2024-08-20
Attending: PHYSICIAN ASSISTANT
Payer: MEDICARE

## 2024-08-20 LAB — PSA SERPL-MCNC: 1.11 NG/ML (ref 0–4)

## 2024-08-20 PROCEDURE — 84153 ASSAY OF PSA TOTAL: CPT

## 2024-08-20 PROCEDURE — 36415 COLL VENOUS BLD VENIPUNCTURE: CPT

## 2024-08-23 ENCOUNTER — OFFICE VISIT (OUTPATIENT)
Dept: MEDICAL GROUP | Facility: MEDICAL CENTER | Age: 69
End: 2024-08-23
Payer: MEDICARE

## 2024-08-23 VITALS
DIASTOLIC BLOOD PRESSURE: 72 MMHG | OXYGEN SATURATION: 98 % | TEMPERATURE: 98.6 F | SYSTOLIC BLOOD PRESSURE: 130 MMHG | HEART RATE: 56 BPM | WEIGHT: 198.41 LBS | BODY MASS INDEX: 29.39 KG/M2 | HEIGHT: 69 IN

## 2024-08-23 DIAGNOSIS — K76.9 LIVER LESION: ICD-10-CM

## 2024-08-23 DIAGNOSIS — F17.200 TOBACCO USE DISORDER: ICD-10-CM

## 2024-08-23 DIAGNOSIS — D64.9 LOW HEMATOCRIT: ICD-10-CM

## 2024-08-23 DIAGNOSIS — I72.3 ILIAC ARTERY ANEURYSM (HCC): ICD-10-CM

## 2024-08-23 DIAGNOSIS — R73.03 PREDIABETES: ICD-10-CM

## 2024-08-23 DIAGNOSIS — D69.6 THROMBOCYTOPENIA (HCC): ICD-10-CM

## 2024-08-23 PROCEDURE — 3078F DIAST BP <80 MM HG: CPT | Performed by: FAMILY MEDICINE

## 2024-08-23 PROCEDURE — 99214 OFFICE O/P EST MOD 30 MIN: CPT | Performed by: FAMILY MEDICINE

## 2024-08-23 PROCEDURE — 3075F SYST BP GE 130 - 139MM HG: CPT | Performed by: FAMILY MEDICINE

## 2024-08-23 ASSESSMENT — FIBROSIS 4 INDEX: FIB4 SCORE: 2.39

## 2024-08-23 ASSESSMENT — ENCOUNTER SYMPTOMS
PALPITATIONS: 0
FEVER: 0
CHILLS: 0

## 2024-08-23 NOTE — PROGRESS NOTES
Verbal consent was acquired by the patient to use [employer] ambient listening note generation during this visit.      Gume was seen today for follow-up.    Diagnoses and all orders for this visit:    Tobacco use disorder    Prediabetes  -     Comp Metabolic Panel; Future  -     HEMOGLOBIN A1C; Future    Low hematocrit  -     CBC WITH DIFFERENTIAL; Future    Thrombocytopenia (HCC)    Liver lesion    Iliac artery aneurysm (HCC)                  Assessment & Plan  1. Tobacco use disorder.  This is a chronic condition, unstable. Extensive counseling was provided regarding quitting smoking. Discussed the importance of finding a strong personal reason to quit and strategies to manage cravings, such as removing all cigarettes and lighters from the home. If unable to quit, consider using nicotine patches or other tapering methods. Follow-up with a new specialist, Dr. Michael Bloch, was suggested.    2. Prediabetes.  Chronic condition, mildly elevated A1c at 5.7. Advised to cut back on sugar intake, including reducing sugar in coffee and limiting processed foods like bread. Recommended continuing with a balanced diet and regular exercise. Labs will be rechecked in 6 months.    3. Low hematocrit level.  New condition, unstable, with mildly low hematocrit and red blood cell levels. Labs will be rechecked in 6 months. If levels remain low, further evaluation will be conducted.    4. Thrombocytopenia.  Chronic condition, mildly low platelet count, likely due to aspirin use. Continue aspirin 81 mg daily due to the aneurysm. Labs will be rechecked in 6 months. If platelet counts remain low, a referral to hematology will be considered.    5. Liver lesion.  Chronic condition, stable. Recent MRI of the abdomen showed capillary or cavernous hemangiomas and an incidental simple renal cyst. No further follow-up needed unless liver function tests are elevated.    6. Iliac artery aneurysm.  Chronic condition, stable. Recently seen by  vascular medicine, who recommended repeat imaging in 1 year. Last imaging on 05/28/2024 showed 1.6 cm bilateral common iliac artery ectasis. Continue aspirin 81 mg daily and atorvastatin 40 mg daily. A reminder for a repeat CT scan in 1 year has been set.    7. Health Maintenance.  The patient is advised to follow up with the gastroenterologist for a colonoscopy.    Follow-up  The patient will follow up in 6 months for lab follow-up.          Chief complaint::Diagnoses of Tobacco use disorder, Prediabetes, Low hematocrit, Thrombocytopenia (HCC), Liver lesion, and Iliac artery aneurysm (HCC) were pertinent to this visit.      History of Present Illness  The patient is a 69-year-old male here for a follow-up visit and has several concerns to discuss.    He expresses dissatisfaction with the specialist he was referred to for his aneurysm, citing a lack of communication about his smoking history and subsequent charges. He has since canceled all appointments with this specialist and requests a referral to a different one.    He remains committed to quitting smoking despite previous failed attempts and understands its harmful effects. He acknowledges that smoking is a habit and uses it as a coping mechanism. He recalls a recent incident where he managed to abstain from smoking for a day by keeping himself occupied but relapsed the following morning due to habitual behavior.    He mentions that he did not fast prior to his recent blood work. His diet includes a lot of fruit, coffee with small amount sugar, and a slice of toast with peanut butter and jelly in the morning. He has recently started incorporating plain oatmeal into his diet.    He reports that when he sustains injuries, they tend to bleed excessively and bruise easily.    He also experiences ringing in his ears, which goes away when he is relaxed and meditating. He feels good when the ringing is not present, although it happens very seldom.      Review of  "Systems   Constitutional:  Negative for chills and fever.   Cardiovascular:  Negative for chest pain, palpitations and leg swelling.          Medications and Allergies:     Current Outpatient Medications   Medication Sig Dispense Refill    Silodosin 4 MG Cap TAKE 1 CAPSULE BY MOUTH EVERY DAY 90 Capsule 3    traZODone (DESYREL) 100 MG Tab TAKE 1 TABLET BY MOUTH EVERY DAY IN THE EVENING 90 Tablet 3    atorvastatin (LIPITOR) 40 MG Tab Take 1 Tablet by mouth every day. To lower cholesterol and heart disease risk 100 Tablet 3    telmisartan (MICARDIS) 20 MG tablet Take 1 Tablet by mouth every day. to lower blood pressure 100 Tablet 3    ezetimibe (ZETIA) 10 MG Tab Take 1 Tablet by mouth every day. To lower cholesterol and heart disease risk 100 Tablet 3    finasteride (PROSCAR) 5 MG Tab Take 1 Tablet by mouth every day. 90 Tablet 3    coenzyme Q-10 30 MG capsule Take 30 mg by mouth every day.      aspirin 81 MG EC tablet Take 1 Tablet by mouth every day.      omeprazole (PRILOSEC) 20 MG delayed-release capsule TAKE 1 CAPSULE BY MOUTH EVERY DAY 90 Capsule 3    mupirocin (BACTROBAN) 2 % Ointment Apply 1 Application topically 3 times a day. 22 g 3    clobetasol (TEMOVATE) 0.05 % Ointment APPLY TOPICALLY TO AFFECTED AREA(S) 2 TIMES A DAY AS NEEDED. APPLY TO KNEES AS NEEDED 60 g 3    zonisamide (ZONEGRAN) 100 MG Cap       Cholecalciferol ([employer]S VITAMIN D) 2000 UNIT Cap Take 1 Cap by mouth every bedtime. For vitamin D deficiency 100 Cap 3     No current facility-administered medications for this visit.       /72 (BP Location: Left arm, Patient Position: Sitting, BP Cuff Size: Large adult)   Pulse (!) 56   Temp 37 °C (98.6 °F) (Temporal)   Ht 1.753 m (5' 9\")   Wt 90 kg (198 lb 6.6 oz)   SpO2 98% , Body mass index is 29.3 kg/m².      Physical Exam  Constitutional:       Appearance: Normal appearance. He is well-developed and well-groomed.   HENT:      Head: Normocephalic and atraumatic.      Right Ear: External ear " normal.      Left Ear: External ear normal.   Eyes:      General:         Right eye: No discharge.         Left eye: No discharge.      Conjunctiva/sclera: Conjunctivae normal.   Cardiovascular:      Rate and Rhythm: Normal rate.   Pulmonary:      Effort: Pulmonary effort is normal. No respiratory distress.   Musculoskeletal:      Cervical back: Neck supple.   Skin:     Findings: No rash.   Neurological:      Mental Status: He is alert.   Psychiatric:         Mood and Affect: Mood and affect normal.         Behavior: Behavior normal.            I reviewed with patient lab results resulted on 8/8/2024.        Please note that this dictation was created using voice recognition software. I have made every reasonable attempt to correct obvious errors, but I expect that there are errors of grammar and possibly content that I did not discover before finalizing the note.

## 2024-12-27 DIAGNOSIS — R21 RASH: ICD-10-CM

## 2024-12-27 DIAGNOSIS — L82.0 SEBORRHEIC KERATOSES, INFLAMED: ICD-10-CM

## 2024-12-30 RX ORDER — CLOBETASOL PROPIONATE 0.5 MG/G
OINTMENT TOPICAL
Qty: 60 G | Refills: 3 | Status: SHIPPED | OUTPATIENT
Start: 2024-12-30

## 2024-12-30 RX ORDER — MUPIROCIN 20 MG/G
1 OINTMENT TOPICAL 3 TIMES DAILY
Qty: 22 G | Refills: 3 | Status: SHIPPED | OUTPATIENT
Start: 2024-12-30

## 2025-01-16 DIAGNOSIS — I72.3 ILIAC ARTERY ANEURYSM, BILATERAL (HCC): ICD-10-CM

## 2025-01-16 DIAGNOSIS — I70.0 AORTIC ATHEROSCLEROSIS (HCC): ICD-10-CM

## 2025-01-16 NOTE — PROGRESS NOTES
- please track for repeat Ao/iliac duplex in 1yr (5/2025)     Orders placed for vascular surveillance imaging.    [employer] message sent to pt to remind that they are due for their surveillance vascular imaging.       Rosie Carlos R.N.   [facility]

## 2025-02-21 ENCOUNTER — HOSPITAL ENCOUNTER (OUTPATIENT)
Dept: LAB | Facility: MEDICAL CENTER | Age: 70
End: 2025-02-21
Attending: FAMILY MEDICINE
Payer: MEDICARE

## 2025-02-21 DIAGNOSIS — D64.9 LOW HEMATOCRIT: ICD-10-CM

## 2025-02-21 DIAGNOSIS — R73.03 PREDIABETES: ICD-10-CM

## 2025-02-21 LAB
ALBUMIN SERPL BCP-MCNC: 4.1 G/DL (ref 3.2–4.9)
ALBUMIN/GLOB SERPL: 1.6 G/DL
ALP SERPL-CCNC: 77 U/L (ref 30–99)
ALT SERPL-CCNC: 10 U/L (ref 2–50)
ANION GAP SERPL CALC-SCNC: 9 MMOL/L (ref 7–16)
AST SERPL-CCNC: 16 U/L (ref 12–45)
BASOPHILS # BLD AUTO: 0.7 % (ref 0–1.8)
BASOPHILS # BLD: 0.05 K/UL (ref 0–0.12)
BILIRUB SERPL-MCNC: 0.5 MG/DL (ref 0.1–1.5)
BUN SERPL-MCNC: 12 MG/DL (ref 8–22)
CALCIUM ALBUM COR SERPL-MCNC: 9.1 MG/DL (ref 8.5–10.5)
CALCIUM SERPL-MCNC: 9.2 MG/DL (ref 8.5–10.5)
CHLORIDE SERPL-SCNC: 111 MMOL/L (ref 96–112)
CO2 SERPL-SCNC: 22 MMOL/L (ref 20–33)
CREAT SERPL-MCNC: 0.95 MG/DL (ref 0.5–1.4)
EOSINOPHIL # BLD AUTO: 0.32 K/UL (ref 0–0.51)
EOSINOPHIL NFR BLD: 4.2 % (ref 0–6.9)
ERYTHROCYTE [DISTWIDTH] IN BLOOD BY AUTOMATED COUNT: 46.6 FL (ref 35.9–50)
EST. AVERAGE GLUCOSE BLD GHB EST-MCNC: 114 MG/DL
FASTING STATUS PATIENT QL REPORTED: NORMAL
GFR SERPLBLD CREATININE-BSD FMLA CKD-EPI: 86 ML/MIN/1.73 M 2
GLOBULIN SER CALC-MCNC: 2.5 G/DL (ref 1.9–3.5)
GLUCOSE SERPL-MCNC: 101 MG/DL (ref 65–99)
HBA1C MFR BLD: 5.6 % (ref 4–5.6)
HCT VFR BLD AUTO: 44.3 % (ref 42–52)
HGB BLD-MCNC: 14.7 G/DL (ref 14–18)
IMM GRANULOCYTES # BLD AUTO: 0.02 K/UL (ref 0–0.11)
IMM GRANULOCYTES NFR BLD AUTO: 0.3 % (ref 0–0.9)
LYMPHOCYTES # BLD AUTO: 1.96 K/UL (ref 1–4.8)
LYMPHOCYTES NFR BLD: 26 % (ref 22–41)
MCH RBC QN AUTO: 31 PG (ref 27–33)
MCHC RBC AUTO-ENTMCNC: 33.2 G/DL (ref 32.3–36.5)
MCV RBC AUTO: 93.5 FL (ref 81.4–97.8)
MONOCYTES # BLD AUTO: 0.53 K/UL (ref 0–0.85)
MONOCYTES NFR BLD AUTO: 7 % (ref 0–13.4)
NEUTROPHILS # BLD AUTO: 4.66 K/UL (ref 1.82–7.42)
NEUTROPHILS NFR BLD: 61.8 % (ref 44–72)
NRBC # BLD AUTO: 0 K/UL
NRBC BLD-RTO: 0 /100 WBC (ref 0–0.2)
PLATELET # BLD AUTO: 95 K/UL (ref 164–446)
PLATELETS.RETICULATED NFR BLD AUTO: 11.7 % (ref 0.6–13.1)
PMV BLD AUTO: 12.5 FL (ref 9–12.9)
POTASSIUM SERPL-SCNC: 4 MMOL/L (ref 3.6–5.5)
PROT SERPL-MCNC: 6.6 G/DL (ref 6–8.2)
RBC # BLD AUTO: 4.74 M/UL (ref 4.7–6.1)
SODIUM SERPL-SCNC: 142 MMOL/L (ref 135–145)
WBC # BLD AUTO: 7.5 K/UL (ref 4.8–10.8)

## 2025-02-21 PROCEDURE — 36415 COLL VENOUS BLD VENIPUNCTURE: CPT | Mod: GA

## 2025-02-21 PROCEDURE — 85025 COMPLETE CBC W/AUTO DIFF WBC: CPT

## 2025-02-21 PROCEDURE — 85055 RETICULATED PLATELET ASSAY: CPT

## 2025-02-21 PROCEDURE — 83036 HEMOGLOBIN GLYCOSYLATED A1C: CPT | Mod: GA

## 2025-02-21 PROCEDURE — 80053 COMPREHEN METABOLIC PANEL: CPT

## 2025-02-24 ENCOUNTER — RESULTS FOLLOW-UP (OUTPATIENT)
Dept: MEDICAL GROUP | Facility: MEDICAL CENTER | Age: 70
End: 2025-02-24

## 2025-02-25 ENCOUNTER — OFFICE VISIT (OUTPATIENT)
Dept: MEDICAL GROUP | Facility: MEDICAL CENTER | Age: 70
End: 2025-02-25
Payer: MEDICARE

## 2025-02-25 VITALS
HEART RATE: 66 BPM | BODY MASS INDEX: 28.57 KG/M2 | HEIGHT: 69 IN | TEMPERATURE: 98.5 F | SYSTOLIC BLOOD PRESSURE: 122 MMHG | DIASTOLIC BLOOD PRESSURE: 68 MMHG | WEIGHT: 192.9 LBS | OXYGEN SATURATION: 98 %

## 2025-02-25 DIAGNOSIS — Z12.12 SCREENING FOR COLORECTAL CANCER: ICD-10-CM

## 2025-02-25 DIAGNOSIS — D69.6 THROMBOCYTOPENIA (HCC): ICD-10-CM

## 2025-02-25 DIAGNOSIS — Z23 NEED FOR VACCINATION: ICD-10-CM

## 2025-02-25 DIAGNOSIS — R73.03 PREDIABETES: ICD-10-CM

## 2025-02-25 DIAGNOSIS — Z12.11 SCREENING FOR COLORECTAL CANCER: ICD-10-CM

## 2025-02-25 DIAGNOSIS — E78.2 MIXED HYPERLIPIDEMIA: ICD-10-CM

## 2025-02-25 DIAGNOSIS — E55.9 VITAMIN D DEFICIENCY: Chronic | ICD-10-CM

## 2025-02-25 DIAGNOSIS — R41.3 MEMORY LOSS: ICD-10-CM

## 2025-02-25 ASSESSMENT — PATIENT HEALTH QUESTIONNAIRE - PHQ9: CLINICAL INTERPRETATION OF PHQ2 SCORE: 0

## 2025-02-25 ASSESSMENT — ENCOUNTER SYMPTOMS
PALPITATIONS: 0
FEVER: 0
CHILLS: 0

## 2025-02-25 ASSESSMENT — FIBROSIS 4 INDEX: FIB4 SCORE: 3.67

## 2025-02-25 NOTE — PROGRESS NOTES
Verbal consent was acquired by the patient to use [employer] ambient listening note generation during this visit.      Gume was seen today for follow-up.    Diagnoses and all orders for this visit:    Thrombocytopenia (HCC)  -     CBC WITH DIFFERENTIAL; Future    Mixed hyperlipidemia  -     Comp Metabolic Panel; Future  -     Lipid Profile; Future    Vitamin D deficiency  -     VITAMIN D,25 HYDROXY (DEFICIENCY); Future    Prediabetes  -     HEMOGLOBIN A1C; Future    Memory loss  -     VITAMIN B12; Future  -     VITAMIN B1; Future  -     FOLATE; Future    Need for vaccination  -     Hep A Adult 19+    Screening for colorectal cancer  -     Cologuard® colon cancer screening                  Assessment & Plan  1. Prediabetes:  Chronic condition, improving  - Blood glucose levels improved from 113 to 101  - A1c improved to 5.6  - Continue using honey as a substitute for sugar in coffee    2. Thrombocytopenia  Chronic condition, unstable  - Low platelet count likely due to zonisamide and aspirin  - Increase intake of fruits and vegetables  - Continue current medication regimen  -Recheck labs in 6-month    3.  Memory problems  Chronic condition, stable, check vitamin B12, B1, folic acid with next test.    4.  Vitamin D deficiency and mixed hyperlipidemia.  Chronic condition, stable, recheck labs with next blood test    5. Health Maintenance  - Receive hepatitis A vaccine today  - Follow-up dose of hepatitis A vaccine in 6 months  - Cologuard test kit to be mailed for stool sample collection    Follow-up in 6 months for blood test follow-up.      Chief complaint::Diagnoses of Thrombocytopenia (HCC), Mixed hyperlipidemia, Vitamin D deficiency, Prediabetes, Memory loss, Need for vaccination, and Screening for colorectal cancer were pertinent to this visit.      History of Present Illness  The patient is a 69-year-old male who presents for lab follow-up.    Blood Glucose Levels  - Reports an improvement in his blood glucose  levels, attributing this to dietary modifications such as substituting sugar with honey in his coffee.  - Mentions a recent incident where he consumed a Snickers bar around 9:30 or 10:00, which he believes may have impacted his blood sugar readings.  - Recalls ingesting a mint prior to his blood draw but promptly spitting it out and is unsure if that would have affected his blood sugar levels.    Platelet Count  - Speculates that his platelet count may be low due to a lack of vegetable intake in his diet.  - Expresses a desire to discontinue aspirin therapy.    Aneurysm  - Scheduled for a scan of his aneurysm in May 2025, which will be forwarded to [facility].  - Plans to transfer all his medical records to [facility] and intends to consult with her prior to the scan.    Vaccinations  - Due for a hepatitis A vaccine.  - Has already received the shingles vaccine.    Colonoscopy  - Due for a colonoscopy but lacks transportation for the procedure.    Vitamin B12 Deficiency  - Expresses concern about potential vitamin B12 deficiency and its association with dementia.        Review of Systems   Constitutional:  Negative for chills and fever.   Cardiovascular:  Negative for chest pain, palpitations and leg swelling.          Medications and Allergies:     Current Outpatient Medications   Medication Sig Dispense Refill    mupirocin (BACTROBAN) 2 % Ointment Apply 1 Application topically 3 times a day. 22 g 3    clobetasol (TEMOVATE) 0.05 % Ointment APPLY TOPICALLY TO AFFECTED AREA(S) 2 TIMES A DAY AS NEEDED. APPLY TO KNEES AS NEEDED 60 g 3    omeprazole (PRILOSEC) 20 MG delayed-release capsule TAKE 1 CAPSULE BY MOUTH EVERY DAY 90 Capsule 3    Silodosin 4 MG Cap TAKE 1 CAPSULE BY MOUTH EVERY DAY 90 Capsule 3    traZODone (DESYREL) 100 MG Tab TAKE 1 TABLET BY MOUTH EVERY DAY IN THE EVENING 90 Tablet 3    atorvastatin (LIPITOR) 40 MG Tab Take 1 Tablet by mouth every day. To lower cholesterol and heart disease risk 100 Tablet 3  "   telmisartan (MICARDIS) 20 MG tablet Take 1 Tablet by mouth every day. to lower blood pressure 100 Tablet 3    ezetimibe (ZETIA) 10 MG Tab Take 1 Tablet by mouth every day. To lower cholesterol and heart disease risk 100 Tablet 3    finasteride (PROSCAR) 5 MG Tab Take 1 Tablet by mouth every day. 90 Tablet 3    coenzyme Q-10 30 MG capsule Take 30 mg by mouth every day.      aspirin 81 MG EC tablet Take 1 Tablet by mouth every day.      zonisamide (ZONEGRAN) 100 MG Cap       Cholecalciferol ([employer] VITAMIN D) 2000 UNIT Cap Take 1 Cap by mouth every bedtime. For vitamin D deficiency 100 Cap 3     No current facility-administered medications for this visit.       /68 (BP Location: Left arm, Patient Position: Sitting, BP Cuff Size: Adult)   Pulse 66   Temp 36.9 °C (98.5 °F) (Temporal)   Ht 1.753 m (5' 9\")   Wt 87.5 kg (192 lb 14.4 oz)   SpO2 98% , Body mass index is 28.49 kg/m².      Physical Exam  Constitutional:       Appearance: Normal appearance. He is well-developed and well-groomed.   HENT:      Head: Normocephalic and atraumatic.      Right Ear: External ear normal.      Left Ear: External ear normal.   Eyes:      General:         Right eye: No discharge.         Left eye: No discharge.      Conjunctiva/sclera: Conjunctivae normal.   Cardiovascular:      Rate and Rhythm: Normal rate.   Pulmonary:      Effort: Pulmonary effort is normal. No respiratory distress.   Musculoskeletal:      Cervical back: Neck supple.   Skin:     Findings: No rash.   Neurological:      Mental Status: He is alert.   Psychiatric:         Mood and Affect: Mood and affect normal.         Behavior: Behavior normal.            I reviewed with patient lab results resulted on 2/21/2025        Please note that this dictation was created using voice recognition software. I have made every reasonable attempt to correct obvious errors, but I expect that there are errors of grammar and possibly content that I did not discover before " finalizing the note.

## 2025-03-10 ENCOUNTER — RESULTS FOLLOW-UP (OUTPATIENT)
Dept: MEDICAL GROUP | Facility: MEDICAL CENTER | Age: 70
End: 2025-03-10
Payer: MEDICARE

## 2025-03-10 LAB — NONINV COLON CA DNA+OCC BLD SCRN STL QL: NEGATIVE

## 2025-04-09 ENCOUNTER — TELEPHONE (OUTPATIENT)
Dept: HEALTH INFORMATION MANAGEMENT | Facility: OTHER | Age: 70
End: 2025-04-09
Payer: MEDICARE

## 2025-04-09 DIAGNOSIS — Z12.2 SCREENING FOR LUNG CANCER: ICD-10-CM

## 2025-04-09 DIAGNOSIS — F17.210 NICOTINE DEPENDENCE, CIGARETTES, UNCOMPLICATED: ICD-10-CM

## 2025-04-09 DIAGNOSIS — F17.200 TOBACCO USE DISORDER: ICD-10-CM

## 2025-04-09 NOTE — TELEPHONE ENCOUNTER
1. Caller Name: Gume Wing                          Call Back Number: [phone]      How would the patient prefer to be contacted with a response: Phone call OK to leave a detailed message    2. SPECIFIC Action To Be Taken: need new order for LD Lung Cancer Screening CT    Member answered the following screening questions:   Do you have any new problems with your breathing since your last exam?   (Like a cough that does not go away, chest pain when you breathe or cough, coughing up blood or rust colored phlegm or spit, shortness of breath that is new to you) (no)    Have you been diagnosed with lung cancer since your last exam?  (no)     Are you a current smoker or former smoker? If former smoker, did you quit smoking within the last 15 years? current    Have you had a CT scan of your chest within the past 12 mos.? May 7, 2024, schedlue after this date      Is appointment scheduled for requested order/referral: no    Patient was informed they will receive a return phone call from the office ONLY if there are any questions before processing their request. Advised to call back if they haven't received a call from the referral department in 5 days.

## 2025-05-01 ENCOUNTER — HOSPITAL ENCOUNTER (OUTPATIENT)
Dept: RADIOLOGY | Facility: MEDICAL CENTER | Age: 70
End: 2025-05-01
Attending: FAMILY MEDICINE
Payer: MEDICARE

## 2025-05-01 DIAGNOSIS — I70.0 AORTIC ATHEROSCLEROSIS (HCC): ICD-10-CM

## 2025-05-01 DIAGNOSIS — I72.3 ILIAC ARTERY ANEURYSM, BILATERAL (HCC): ICD-10-CM

## 2025-05-01 PROCEDURE — 93978 VASCULAR STUDY: CPT

## 2025-05-01 PROCEDURE — 93978 VASCULAR STUDY: CPT | Mod: 26 | Performed by: INTERNAL MEDICINE

## 2025-05-02 ENCOUNTER — RESULTS FOLLOW-UP (OUTPATIENT)
Dept: CARDIOLOGY | Facility: PHYSICIAN GROUP | Age: 70
End: 2025-05-02
Payer: MEDICARE

## 2025-05-02 ENCOUNTER — TELEPHONE (OUTPATIENT)
Dept: VASCULAR LAB | Facility: MEDICAL CENTER | Age: 70
End: 2025-05-02
Payer: MEDICARE

## 2025-05-02 ENCOUNTER — DOCUMENTATION (OUTPATIENT)
Dept: VASCULAR LAB | Facility: MEDICAL CENTER | Age: 70
End: 2025-05-02
Payer: MEDICARE

## 2025-05-02 NOTE — PROGRESS NOTES
Ao/iliac duplex showing AAA 3.4cm (small) , diffuse atherosclerotic plaque.   Last seen 5/2024 and overdue for [facility] med clinic f/u.   MA please contact to schedule with next available provider     Plan for repeat Ao/iliac duplex 2yr (5/2027) RN to track

## 2025-05-02 NOTE — TELEPHONE ENCOUNTER
Called and s/w patient.  Notified patient most recent U/S results and scheduled f/u with Vascular APRN for next week.    Kike Ward M.D. at 5/2/2025 12:38 PM    Status: Signed   Ao/iliac duplex showing AAA 3.4cm (small) , diffuse atherosclerotic plaque.   Last seen 5/2024 and overdue for [facility] med clinic f/u.   MA please contact to schedule with next available provider      Plan for repeat Ao/iliac duplex 2yr (5/2027) RN to track         Megan Pham Med [facility]'t  [facility]  Ph. [phone]  Fx. [phone]

## 2025-05-06 ENCOUNTER — TELEPHONE (OUTPATIENT)
Dept: HEALTH INFORMATION MANAGEMENT | Facility: OTHER | Age: 70
End: 2025-05-06
Payer: MEDICARE

## 2025-05-09 ENCOUNTER — APPOINTMENT (OUTPATIENT)
Dept: VASCULAR LAB | Facility: MEDICAL CENTER | Age: 70
End: 2025-05-09
Payer: MEDICARE

## 2025-05-14 ENCOUNTER — HOSPITAL ENCOUNTER (OUTPATIENT)
Dept: RADIOLOGY | Facility: MEDICAL CENTER | Age: 70
End: 2025-05-14
Attending: FAMILY MEDICINE
Payer: MEDICARE

## 2025-05-14 DIAGNOSIS — F17.210 NICOTINE DEPENDENCE, CIGARETTES, UNCOMPLICATED: ICD-10-CM

## 2025-05-14 PROCEDURE — 71271 CT THORAX LUNG CANCER SCR C-: CPT

## 2025-05-15 ENCOUNTER — RESULTS FOLLOW-UP (OUTPATIENT)
Dept: MEDICAL GROUP | Facility: MEDICAL CENTER | Age: 70
End: 2025-05-15

## 2025-05-19 DIAGNOSIS — N32.89 BLADDER WALL THICKENING: ICD-10-CM

## 2025-05-19 DIAGNOSIS — N40.0 PROSTATE HYPERTROPHY: ICD-10-CM

## 2025-05-20 ENCOUNTER — OFFICE VISIT (OUTPATIENT)
Dept: VASCULAR LAB | Facility: MEDICAL CENTER | Age: 70
End: 2025-05-20
Payer: MEDICARE

## 2025-05-20 VITALS
HEART RATE: 59 BPM | RESPIRATION RATE: 14 BRPM | DIASTOLIC BLOOD PRESSURE: 65 MMHG | BODY MASS INDEX: 26.96 KG/M2 | WEIGHT: 182 LBS | HEIGHT: 69 IN | SYSTOLIC BLOOD PRESSURE: 123 MMHG

## 2025-05-20 DIAGNOSIS — D69.6 THROMBOCYTOPENIA (HCC): ICD-10-CM

## 2025-05-20 DIAGNOSIS — I70.0 AORTIC ATHEROSCLEROSIS (HCC): ICD-10-CM

## 2025-05-20 DIAGNOSIS — I72.3 ILIAC ARTERY ANEURYSM, BILATERAL (HCC): Primary | ICD-10-CM

## 2025-05-20 DIAGNOSIS — F17.200 TOBACCO USE DISORDER: ICD-10-CM

## 2025-05-20 DIAGNOSIS — I25.10 CORONARY ARTERY CALCIFICATION SEEN ON CT SCAN: ICD-10-CM

## 2025-05-20 DIAGNOSIS — E78.2 MIXED HYPERLIPIDEMIA: ICD-10-CM

## 2025-05-20 DIAGNOSIS — I73.9 PAD (PERIPHERAL ARTERY DISEASE) (HCC): ICD-10-CM

## 2025-05-20 DIAGNOSIS — Z79.02 LONG TERM (CURRENT) USE OF ANTITHROMBOTICS/ANTIPLATELETS: ICD-10-CM

## 2025-05-20 DIAGNOSIS — I10 PRIMARY HYPERTENSION: ICD-10-CM

## 2025-05-20 DIAGNOSIS — R73.03 PREDIABETES: ICD-10-CM

## 2025-05-20 PROCEDURE — 99406 BEHAV CHNG SMOKING 3-10 MIN: CPT

## 2025-05-20 PROCEDURE — 99214 OFFICE O/P EST MOD 30 MIN: CPT | Mod: 25

## 2025-05-20 PROCEDURE — 3074F SYST BP LT 130 MM HG: CPT

## 2025-05-20 PROCEDURE — 99212 OFFICE O/P EST SF 10 MIN: CPT

## 2025-05-20 PROCEDURE — 3078F DIAST BP <80 MM HG: CPT

## 2025-05-20 RX ORDER — FINASTERIDE 5 MG/1
5 TABLET, FILM COATED ORAL DAILY
Qty: 90 TABLET | Refills: 3 | Status: SHIPPED | OUTPATIENT
Start: 2025-05-20

## 2025-05-20 ASSESSMENT — ENCOUNTER SYMPTOMS
CHILLS: 0
PALPITATIONS: 0
COUGH: 0
BLOOD IN STOOL: 0
HEADACHES: 0
MYALGIAS: 0
CLAUDICATION: 0
FEVER: 0
SHORTNESS OF BREATH: 0
BRUISES/BLEEDS EASILY: 1
DIZZINESS: 0

## 2025-05-20 ASSESSMENT — FIBROSIS 4 INDEX: FIB4 SCORE: 3.67

## 2025-05-20 NOTE — PROGRESS NOTES
VASCULAR MEDICINE CLINIC - Follow Up VISIT   05/20/2025     Mazin Wing is a 68 y.o. male  who has been referred for vascular medicine evaluation and management for PAD/iliac artery aneurysm (IAA)  Referring provider: Brandon Stephenson M.D.     Subjective    Last seen 5/2024 for initial appt  Doing well, no concerns  Did imaging - reviewed   Following with PCP and cardiology regularly   Reports doing better with medication compliance - now missing 2-3 days/week, where prior it was only taking meds once a week or 2.    Has been decreasing sugars  [place] 3-4x/[place]  No claudication - denies walking or resting pain, no temp/color or wounds  Legs pains are related to ortho pain - knees and hip (from hip replacement  Still smoking 1/2ppd - wants to quit, but not successful with NRT.        Iliac artery aneurysm / PAD  Initial visit hx: seen by PCP 4/2024 for medicare AWV, noted to have IAA after screening AAA duplex due to smoking   Possible chronic limb ischemic sx: denies rest pain, nonhealing leg wounds, cold extremities, coloration changes    Pertinent PAD pmxh:  Tobacco hx:  reports that he has been smoking cigarettes. He has a 21.5 pack-year smoking history. He has never used smokeless tobacco.    Other established ASCVD:  CAD - subclinical per CT scan, no prior events, no sx     HTN: tolerating telmisartan, Home BP log: rare taking , reports 120s/70s when do take  HLD: Stable, current treatment: lifestyle modifications, Moderate intensity statin, and ezetimibe - tolerating, improved adherence - misses 2-3 days/week    Antithrombotic tx: ASA 81mg - no hx of bleeding    Dysglycemia: no, improved     Patient Active Problem List    Diagnosis Date Noted    Low hematocrit 08/23/2024    Liver lesion 05/31/2024    Renal cyst 05/31/2024    Prostate hypertrophy 05/31/2024    Bladder wall thickening 05/31/2024    PAD (peripheral artery disease) (HCC) 05/22/2024    Coronary artery  calcification seen on CT scan 05/22/2024    Long term (current) use of antithrombotics/antiplatelets 05/22/2024    Iliac artery aneurysm (HCC) 04/16/2024    Current moderate episode of major depressive disorder without prior episode (HCC) 02/17/2023    Other insomnia 02/17/2023    Nicotine dependence, uncomplicated (Current Smoker) 01/13/2023    Mild aortic insufficiency 06/23/2022    Aortic atherosclerosis (HCC) 05/17/2021    Thrombocytopenia (HCC) 02/24/2020    Memory loss 01/20/2020    Elevated PSA, less than 10 ng/ml 02/25/2019    Pulmonary nodules 11/01/2016    Nonintractable migraine 08/31/2015    Tobacco use disorder 08/11/2015    Peripheral neuropathy 07/09/2015    Seizure ([facility]) 2013    Vitamin D deficiency 03/08/2010    Primary hypertension     Osteoarthritis of multiple joints     Esophageal reflux     Carpal tunnel syndrome     Prediabetes     Mixed hyperlipidemia 06/05/2009         Family History   Problem Relation Age of Onset    Cancer Mother     Lung Cancer Mother     Breast Cancer Daughter     Lung Disease Other       Current Outpatient Medications on File Prior to Visit   Medication Sig Dispense Refill    finasteride (PROSCAR) 5 MG Tab Take 1 Tablet by mouth every day. 90 Tablet 3    mupirocin (BACTROBAN) 2 % Ointment Apply 1 Application topically 3 times a day. 22 g 3    clobetasol (TEMOVATE) 0.05 % Ointment APPLY TOPICALLY TO AFFECTED AREA(S) 2 TIMES A DAY AS NEEDED. APPLY TO KNEES AS NEEDED 60 g 3    omeprazole (PRILOSEC) 20 MG delayed-release capsule TAKE 1 CAPSULE BY MOUTH EVERY DAY 90 Capsule 3    Silodosin 4 MG Cap TAKE 1 CAPSULE BY MOUTH EVERY DAY 90 Capsule 3    traZODone (DESYREL) 100 MG Tab TAKE 1 TABLET BY MOUTH EVERY DAY IN THE EVENING 90 Tablet 3    atorvastatin (LIPITOR) 40 MG Tab Take 1 Tablet by mouth every day. To lower cholesterol and heart disease risk 100 Tablet 3    telmisartan (MICARDIS) 20 MG tablet Take 1 Tablet by mouth every day. to lower blood pressure 100 Tablet 3     "ezetimibe (ZETIA) 10 MG Tab Take 1 Tablet by mouth every day. To lower cholesterol and heart disease risk 100 Tablet 3    coenzyme Q-10 30 MG capsule Take 30 mg by mouth every day.      aspirin 81 MG EC tablet Take 1 Tablet by mouth every day.      zonisamide (ZONEGRAN) 100 MG Cap       Cholecalciferol ([employer]S VITAMIN D) 2000 UNIT Cap Take 1 Cap by mouth every bedtime. For vitamin D deficiency 100 Cap 3     No current facility-administered medications on file prior to visit.      Allergies   Allergen Reactions    Erythromycin      Severe stomach pain    Latex Rash     .    Other Environmental      pollen        Social History     Tobacco Use    Smoking status: Every Day     Current packs/day: 0.50     Average packs/day: 0.5 packs/day for 43.0 years (21.5 ttl pk-yrs)     Types: Cigarettes    Smokeless tobacco: Never    Tobacco comments:     5/21/24 starting to quit 5sig yesterday    Vaping Use    Vaping status: Never Used   Substance Use Topics    Alcohol use: Not Currently     Alcohol/week: 0.0 oz     Comment: rare    Drug use: No     DIET AND EXERCISE:  Weight Change:stable  Diet: common adult  Exercise: no regular exercise program   Walking 10-16 miles 3-4x/week    Review of Systems   Constitutional:  Negative for chills, fever and malaise/fatigue.   HENT:  Negative for nosebleeds.    Respiratory:  Negative for cough and shortness of breath.    Cardiovascular:  Negative for chest pain, palpitations, claudication and leg swelling.   Gastrointestinal:  Negative for blood in stool.   Genitourinary:  Negative for hematuria.   Musculoskeletal:  Positive for joint pain (knees). Negative for myalgias.   Neurological:  Negative for dizziness and headaches.   Endo/Heme/Allergies:  Bruises/bleeds easily.          Objective    Vitals:    05/20/25 1529   BP: 123/65   Pulse: (!) 59   Resp: 14   Weight: 82.6 kg (182 lb)   Height: 1.753 m (5' 9\")      BP Readings from Last 4 Encounters:   05/20/25 123/65   02/25/25 122/68 " "  08/23/24 130/72   06/13/24 110/60      Body mass index is 26.88 kg/m².   Wt Readings from Last 4 Encounters:   05/20/25 82.6 kg (182 lb)   02/25/25 87.5 kg (192 lb 14.4 oz)   08/23/24 90 kg (198 lb 6.6 oz)   06/13/24 88.5 kg (195 lb 3.2 oz)      Physical Exam  Vitals reviewed.   Constitutional:       General: He is not in acute distress.     Appearance: Normal appearance. He is not diaphoretic.   HENT:      Head: Normocephalic and atraumatic.   Eyes:      Conjunctiva/sclera: Conjunctivae normal.   Cardiovascular:      Rate and Rhythm: Normal rate and regular rhythm.      Pulses:           Posterior tibial pulses are 2+ on the right side and 2+ on the left side.      Heart sounds: Normal heart sounds. No murmur heard.  Pulmonary:      Effort: Pulmonary effort is normal.      Breath sounds: Normal breath sounds.   Musculoskeletal:      Right lower leg: No edema.      Left lower leg: No edema.   Skin:     General: Skin is warm and dry.   Neurological:      Mental Status: He is alert and oriented to person, place, and time.      Cranial Nerves: No cranial nerve deficit.      Gait: Gait is intact.   Psychiatric:         Mood and Affect: Mood and affect normal.          DATA REVIEW    Lab Results   Component Value Date/Time    CHOLSTRLTOT 93 (L) 08/08/2024 08:48 AM    LDL 42 08/08/2024 08:48 AM    HDL 42 08/08/2024 08:48 AM    TRIGLYCERIDE 43 08/08/2024 08:48 AM       Lab Results   Component Value Date/Time    LIPOPROTA <6 06/07/2024 07:45 AM      No results found for: \"APOB\"   Lab Results   Component Value Date/Time    SODIUM 142 02/21/2025 08:07 AM    POTASSIUM 4.0 02/21/2025 08:07 AM    CHLORIDE 111 02/21/2025 08:07 AM    CO2 22 02/21/2025 08:07 AM    GLUCOSE 101 (H) 02/21/2025 08:07 AM    BUN 12 02/21/2025 08:07 AM    CREATININE 0.95 02/21/2025 08:07 AM    GLOMRATE 89 06/27/2022 10:32 AM     Lab Results   Component Value Date/Time    ALKPHOSPHAT 77 02/21/2025 08:07 AM    ASTSGOT 16 02/21/2025 08:07 AM    ALTSGPT 10 " 02/21/2025 08:07 AM    TBILIRUBIN 0.5 02/21/2025 08:07 AM       Lab Results   Component Value Date/Time    HBA1C 5.6 02/21/2025 08:07 AM       Lab Results   Component Value Date/Time    MALBCRT see below 06/07/2024 07:45 AM    MICROALBUR <1.2 06/07/2024 07:45 AM         Cardiovascular Imaging:    Echo 2021  Left ventricular ejection fraction is visually estimated to be 65%.  Mildly dilated left atrium.  Mild aortic insufficiency.  Estimated right ventricular systolic pressure is 33 mmHg.   No prior study is available for comparison    Ao/iliac duplex 5/2023  Proximal abdominal aorta measures 2.56 cm x 2.59 cm.   Mid abdominal aorta measures 2.01 cm x 2.28 cm.   Distal abdominal aorta measures 1.82 cm x 2.35 cm.   The right common iliac artery measures 1.05 cm x 1.91 cm, the left 1.07 cm x 2.09 cm.   No incidental abnormalities in the visualized portions of the retroperitoneum are identified.   IMPRESSION:   1.  No evidence of abdominal aortic aneurysm.  2.  Ectasia of the bilateral common iliac arteries.  3.  Moderate atherosclerotic plaque.    LDCT 5/2024  Thoracic aorta and great vessels:  No aneurysm.   Heart and pericardium:  There is mild coronary artery calcification. No pericardial effusion.    CTA abd/pelvis 5/2024  1. Bilateral common iliac arteries are ectatic, 1.6 cm in diameter. Aorta is nonaneurysmal.  2. There are three indeterminate arterial enhancing foci within the liver. Largest measures 2.1 cm. Recommend MRI abdomen with/without contrast for further evaluation.  3. 1.47 m simple left renal cyst, which does not require follow-up.  4. Mild generalized bladder wall thickening, correlate for hypertrophy or cystitis.  5. Prostate is enlarged, 6.7 cm.    Ao/iliac duplex 5/2025   Ectasia of abdominal aorta; proximal diameter is 3.4 x 2.4 cm   Common iliac artery diameters (cm):  prox:  Right-  1.5     Left- 1.6     distal:     right: 1.6  left: 1.6   Diffuse atherosclerotic plaque within the abdominal  aorta and bilateral iliac arteries.    Waveforms and velocities of the abdominal aorta and bilateral iliac arteries    are normal.   No stenosis of the proximal segment of the major visceral arteries.        Medical Decision Making:  Today's Assessment / Status / Plan:     1. Iliac artery aneurysm, bilateral (HCC)        2. Aortic atherosclerosis (HCC)        3. PAD (peripheral artery disease) (HCC)        4. Primary hypertension        5. Mixed hyperlipidemia        6. Prediabetes        7. Thrombocytopenia (HCC)        8. Tobacco use disorder        9. Coronary artery calcification seen on CT scan        10. Long term (current) use of antithrombotics/antiplatelets             Etiology of Established CVD if Present:     1) bilat iliac artery aneurysms - asymptomatic   5/2023 duplex:  R = 1.91cm   /  L  = 2.09cm  5/2024CTA abd/pelvis = bilat iliac arteries measure 1.6cm, smaller than prior duplex findings in the past.  CTA is more specific/accurate, so we presume this to be the true sizing.  Generally <1.85cm in male is considered normal, so this would indicate normal iliac artery sizes.    5/2025 ao/iliac duplex = AAA 3.4cm (small) , diffuse atherosclerotic plaque  - in generally, <1.85cm is consider normal for men, so he has small bilat aneurysms   - constitutes type of inflow PAD, no indication of associated AAA or more distal PAD  - No indications of true chronic limb threatening ischemia (CLTI) changes or hx of lifestyle-limiting symptoms   - as reviewed, non-surgical med mgmt and tobacco cessation are key to reduce expansion rate  - surgery recommended at 3+cm and definitely by >3.5cm to limit risk for dissection and rupture   Plan:  - continue lifestyle measures and tobacco avoidance  - start/continue structured exercise therapy for 12 weeks as outlined in care instructions, monitor pain-free and total walking distance to monitor progress  - continue secondary prevention treatment goals, intensive med mgmt  "and lifestyle interventions   - repeat ao/iliac duplex - due 5/2027 - not ordered   - consider BOB/BLE art duplex in future     LIPID MANAGEMENT  Qualifies for Statin Therapy Based on 2018 ACC/AHA Guidelines: yes, Secondary Prevention - Very high risk ASCVD - 2 major events or 1 event with other high-risk conditions  10-yr ASCVD risk score: The ASCVD Risk score (Suhas DK, et al., 2019) failed to calculate., >20% \"high risk\"   Major ASCVD events: bilat iliac art aneurysms    High-risk conditions: >64yr old , Hypertension , and Current smoking   Currently on Statin: Yes  Tx goals: LDL-C <55   At goal?  Yes, 8/2024  Plan:   - continue atorva to 40mg daily   - continue zetia 10mg daily    - check lipid per PCP    BLOOD PRESSURE MANAGEMENT  Office BP Goal ACC/AHA (2017) goal <130/80  Home BP at goal:  yes, rare checking   Office BP at goal:  yes  24h ABPM:  not ordered to date   RDN candidate? YES  Contributing factors: tobacco   Plan:   - start/continue home BP monitoring, reviewed correct technique, provide BP log and instructions  Medications:  - continue telmisartan 20mg daily     GLYCEMIC MANAGEMENT Prediabetic, IFG only  Lab Results   Component Value Date    HBA1C 5.6 02/21/2025    Plan:  - continue healthy diet, weight reduction, daily physical activity   - consider metformin initiation up to 850mg BID if A1c 6.2+ in future to reduce T2D progression  - monitor labs Q6-12mo - as per PCP    ANTITHROMBOTIC THERAPY: yes  - continue ASA 81mg daily     LIFESTYLE INTERVENTIONS  TOBACCO: Stages of Change: Contemplative (intention to change in next 6 months ) no prior quit attemptos    reports that he has been smoking cigarettes. He has a 21.5 pack-year smoking history. He has never used smokeless tobacco.   Provided strong recommendation for complete cessation and informed this is the primary contributor to the majority of all ASCVD and cancer-related conditions and can result in significant morbidity and early " mortality.  - reviewed   - reviewed resources for cessation including tobacco cessation clinic visit, pharmacotx meds, quit lines  - review at every visit   - choose quit date, consider NRT, trial and failed chantix in the past   Provided 5 minutes of face-to-face counseling on above topics     PHYSICAL ACTIVITY: >150min/week of mod-intensity activity or as much as tolerated  NUTRITION: Mediterranean, reduce Na to 1500mg/day, and - handout provided   ETOH: limit to 2 or less standard drinks/day   WT MGMT: maintain healthy weight (reduction 1kg = reduction 1mmHg BP)        OTHER:     # thrombocytopenia - unclear etiology, mild, no hx of spont bleeding   - stable over last 2 yrs, remainder of CBC wnl   Plan  - ok to continue ASA   - monitor CBC Q6-12mo    Studies to Be Obtained: ao/iliac duplex -5/2027 not ordered   Labs to Be Obtained: as per PCP    Follow up in: 12 months    JODI Doan   [facility]  [facility]  [phone]    Cc:

## 2025-06-19 ENCOUNTER — OFFICE VISIT (OUTPATIENT)
Dept: CARDIOLOGY | Facility: PHYSICIAN GROUP | Age: 70
End: 2025-06-19
Payer: MEDICARE

## 2025-06-19 VITALS
BODY MASS INDEX: 27.55 KG/M2 | HEART RATE: 65 BPM | HEIGHT: 69 IN | SYSTOLIC BLOOD PRESSURE: 128 MMHG | DIASTOLIC BLOOD PRESSURE: 52 MMHG | OXYGEN SATURATION: 97 % | WEIGHT: 186 LBS | RESPIRATION RATE: 14 BRPM

## 2025-06-19 DIAGNOSIS — I25.10 CORONARY ARTERY CALCIFICATION SEEN ON CT SCAN: ICD-10-CM

## 2025-06-19 DIAGNOSIS — I10 PRIMARY HYPERTENSION: ICD-10-CM

## 2025-06-19 DIAGNOSIS — F17.210 NICOTINE DEPENDENCE, CIGARETTES, UNCOMPLICATED: ICD-10-CM

## 2025-06-19 DIAGNOSIS — R06.02 SHORTNESS OF BREATH: ICD-10-CM

## 2025-06-19 DIAGNOSIS — I70.0 AORTIC ATHEROSCLEROSIS (HCC): ICD-10-CM

## 2025-06-19 DIAGNOSIS — I35.1 MILD AORTIC INSUFFICIENCY: ICD-10-CM

## 2025-06-19 DIAGNOSIS — E78.2 MIXED HYPERLIPIDEMIA: ICD-10-CM

## 2025-06-19 PROCEDURE — 3078F DIAST BP <80 MM HG: CPT | Performed by: NURSE PRACTITIONER

## 2025-06-19 PROCEDURE — 3074F SYST BP LT 130 MM HG: CPT | Performed by: NURSE PRACTITIONER

## 2025-06-19 PROCEDURE — 99214 OFFICE O/P EST MOD 30 MIN: CPT | Performed by: NURSE PRACTITIONER

## 2025-06-19 RX ORDER — ATORVASTATIN CALCIUM 40 MG/1
40 TABLET, FILM COATED ORAL DAILY
Qty: 100 TABLET | Refills: 3 | Status: SHIPPED | OUTPATIENT
Start: 2025-06-19

## 2025-06-19 RX ORDER — TELMISARTAN 20 MG/1
20 TABLET ORAL DAILY
Qty: 100 TABLET | Refills: 3 | Status: SHIPPED | OUTPATIENT
Start: 2025-06-19

## 2025-06-19 RX ORDER — EZETIMIBE 10 MG/1
10 TABLET ORAL DAILY
Qty: 100 TABLET | Refills: 3 | Status: SHIPPED | OUTPATIENT
Start: 2025-06-19

## 2025-06-19 ASSESSMENT — ENCOUNTER SYMPTOMS
INSOMNIA: 1
CHILLS: 0
DIZZINESS: 0
ABDOMINAL PAIN: 0
ORTHOPNEA: 0
SHORTNESS OF BREATH: 1
NAUSEA: 0
LOSS OF CONSCIOUSNESS: 0
COUGH: 0
FEVER: 0
BRUISES/BLEEDS EASILY: 0
PALPITATIONS: 0
MYALGIAS: 0
HEADACHES: 0
PND: 0

## 2025-06-19 ASSESSMENT — FIBROSIS 4 INDEX: FIB4 SCORE: 3.67

## 2025-06-19 NOTE — PROGRESS NOTES
Chief Complaint   Patient presents with    Follow-Up    HTN (Controlled)    Hyperlipidemia       Subjective     Gume Wing is a 69 y.o. male who presents today for annual follow-up of HTN, hyperlipidemia/aortic atherosclerosis, mild AR, GERD and tobacco use.    Gume is 69 year male with history of hypertension, mild iliac aneurysm,hyperlipidemia/aortic atherosclerosis, mild AR, GERD and tobacco use, last seen by me in June 2024.     He has been working hard on losing weight over the last couple of years, and he is down 100+ pounds, which he has maintained. He is down another 10 pounds.     He did recently have a LE US, and was seen by Vascular Medicine. He was advised to stay on his lipid medication and quit smoking.     He is here today for annual follow-up. He has noticed some increased shortness of breath, usually at nighttime when sleeping; only some during the day with exertion. He denies any overt chest pain, pressure, tightness or discomfort; no symptomatic palpitations; no PND; no dizziness, lightheadedness or syncope; no LE edema.     He is still smoking, <1/2 ppd. He did have a CT scan of the chest which showed stable lung nodule.    Past Medical History[1]  Past Surgical History[2]  Family History   Problem Relation Age of Onset    Cancer Mother     Lung Cancer Mother     Breast Cancer Daughter     Lung Disease Other      Social History     Socioeconomic History    Marital status:      Spouse name: Not on file    Number of children: Not on file    Years of education: Not on file    Highest education level: Not on file   Occupational History    Not on file   Tobacco Use    Smoking status: Every Day     Current packs/day: 0.50     Average packs/day: 0.5 packs/day for 43.0 years (21.5 ttl pk-yrs)     Types: Cigarettes    Smokeless tobacco: Never    Tobacco comments:     5/21/24 starting to quit 5sig yesterday    Vaping Use    Vaping status: Never Used   Substance and Sexual Activity     "Alcohol use: Not Currently     Alcohol/week: 0.0 oz     Comment: rare    Drug use: No    Sexual activity: Yes     Partners: Female     Birth control/protection: Post-Menopausal     Comment: , 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[employer]   Other Topics Concern     Service Not Asked    Blood Transfusions Not Asked    Caffeine Concern Not Asked    Occupational Exposure Not Asked    Hobby Hazards Not Asked    Sleep Concern Not Asked    Stress Concern Not Asked    Weight Concern Not Asked    Special Diet Not Asked    Back Care Not Asked    Exercise No    Bike Helmet Not Asked    Seat Belt Not Asked    Self-Exams Not Asked   Social History Narrative    , 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   HENT:  Negative for congestion.    Respiratory:  Positive for shortness of breath. Negative for cough.    Cardiovascular:  Negative for chest pain, palpitations, orthopnea, leg swelling and PND.   Gastrointestinal:  Negative for abdominal pain and nausea.   Musculoskeletal:  Positive for joint pain. Negative for myalgias.   Skin:  Negative for rash.   Neurological:  Negative for dizziness, loss of consciousness and headaches.   Endo/Heme/Allergies:  Does not bruise/bleed easily.   Psychiatric/Behavioral:  The patient has insomnia.               Objective     /52 (BP Location: Left arm, Patient Position: Sitting, BP Cuff Size: Adult)   Pulse 65   Resp 14   Ht 1.753 m (5' 9\")   Wt 84.4 kg (186 lb)   SpO2 97%   BMI 27.47 kg/m²     Physical Exam  Constitutional:       Appearance: He is " well-developed.   HENT:      Head: Normocephalic.   Neck:      Vascular: No JVD.   Cardiovascular:      Rate and Rhythm: Normal rate and regular rhythm.      Heart sounds: Normal heart sounds.   Pulmonary:      Effort: Pulmonary effort is normal. No respiratory distress.      Breath sounds: Normal breath sounds. No wheezing or rales.   Abdominal:      General: Bowel sounds are normal. There is no distension.      Palpations: Abdomen is soft.      Tenderness: There is no abdominal tenderness.   Musculoskeletal:         General: Normal range of motion.      Cervical back: Normal range of motion and neck supple.   Skin:     General: Skin is warm and dry.      Findings: No rash.   Neurological:      Mental Status: He is alert and oriented to person, place, and time.   Psychiatric:         Mood and Affect: Mood normal.         Behavior: Behavior normal.       CT SCAN(S):    IMPRESSION OF CT SCAN OF CHEST OF 5/14/2025:  Stable partially calcified RIGHT upper lobe nodule measuring 5 mm.  No new suspicious pulmonary nodule.    IMPRESSION OF CTA OF PELVIS/ABDOMEN OF 5/29/2024:  1. Bilateral common iliac arteries are ectatic, 1.6 cm in diameter. Aorta is nonaneurysmal.  2. There are three indeterminate arterial enhancing foci within the liver. Largest measures 2.1 cm. Recommend MRI abdomen with/without contrast for further evaluation.  3. 1.47 m simple left renal cyst, which does not require follow-up.  4. Mild generalized bladder wall thickening, correlate for hypertrophy or cystitis.  5. Prostate is enlarged, 6.7 cm.      IMPRESSION OF CT SCAN OF CHEST OF 1/21/2023:  1.  Stable calcified granuloma in the right upper lobe, benign.  2.  No new pulmonary nodules or masses.    MRI(S):    IMPRESSION OF MRI OF ABDOMEN OF 7/24/2024:  1.  No evidence for malignancy  2.  Hepatic dome probably hepatic segment 7 17 mm hyperintense structure consistent with capillary or cavernous hemangioma  3.  Hepatic segment 2 7 mm capillary or  cavernous hemangioma  4.  The 3rd enhancing abnormality is not identified on MRI and therefore likely a capillary hemangioma  5.  Incidental simple renal cyst    SONOGRAPHY:    CONCLUSIONS OF ABDOMINAL US OF 5/1/2025:  Ectasia of abdominal aorta; proximal diameter is 3.4 x 2.4 cm  Common iliac artery diameters (cm):    prox:  Right-  1.5     Left- 1.6     distal:  right: 1.6  left: 1.6  Diffuse atherosclerotic plaque within the abdominal aorta and bilateral iliac arteries.   Waveforms and velocities of the abdominal aorta and bilateral iliac arteries are normal.  No stenosis of the proximal segment of the major visceral arteries.    IMPRESSION OF ABDOMINAL US OF 5/11/2023:  1.  No evidence of abdominal aortic aneurysm.  2.  Ectasia of the bilateral common iliac arteries.  3.  Moderate atherosclerotic plaque.     ECHOCARDIOGRAPHY:    CONCLUSIONS OF TTE OF 6/14/2021:  Left ventricular ejection fraction is visually estimated to be 65%.  Mildly dilated lefft atrium.  Mild aortic insufficiency.  Estimated right ventricular systolic pressure is 33 mmHg.  No prior study is available for comparison.      LABS:    Component      Latest Ref Rng 8/8/2024 2/21/2025   Glycohemoglobin      4.0 - 5.6 % 5.7 (H)  5.6    Estim. Avg Glu      mg/dL 117  114       Lab Results   Component Value Date/Time    SODIUM 142 02/21/2025 08:07 AM    POTASSIUM 4.0 02/21/2025 08:07 AM    CHLORIDE 111 02/21/2025 08:07 AM    CO2 22 02/21/2025 08:07 AM    GLUCOSE 101 (H) 02/21/2025 08:07 AM    BUN 12 02/21/2025 08:07 AM    CREATININE 0.95 02/21/2025 08:07 AM    GLOMRATE 89 06/27/2022 10:32 AM      Lab Results   Component Value Date/Time    ASTSGOT 16 02/21/2025 08:07 AM    ALTSGPT 10 02/21/2025 08:07 AM       Lab Results   Component Value Date/Time    WBC 7.5 02/21/2025 08:07 AM    RBC 4.74 02/21/2025 08:07 AM    HEMOGLOBIN 14.7 02/21/2025 08:07 AM    HEMATOCRIT 44.3 02/21/2025 08:07 AM    MCV 93.5 02/21/2025 08:07 AM    MCH 31.0 02/21/2025 08:07 AM     MCHC 33.2 02/21/2025 08:07 AM    MPV 12.5 02/21/2025 08:07 AM       Lab Results   Component Value Date/Time    CHOLSTRLTOT 93 (L) 08/08/2024 08:48 AM    LDL 42 08/08/2024 08:48 AM    HDL 42 08/08/2024 08:48 AM    TRIGLYCERIDE 43 08/08/2024 08:48 AM          Assessment & Plan     1. Primary hypertension  telmisartan (MICARDIS) 20 MG tablet    EC-ECHOCARDIOGRAM COMPLETE W/O CONT      2. Mixed hyperlipidemia  atorvastatin (LIPITOR) 40 MG Tab    ezetimibe (ZETIA) 10 MG Tab      3. Coronary artery calcification seen on CT scan        4. Aortic atherosclerosis (HCC)        5. Shortness of breath  EC-ECHOCARDIOGRAM COMPLETE W/O CONT      6. Mild aortic insufficiency  EC-ECHOCARDIOGRAM COMPLETE W/O CONT      7. Nicotine dependence, uncomplicated (Current Smoker)            Medical Decision Making: Today's Assessment/Status/Plan:        1. Hypertension, treated with Micardis 20mg once daily. BP is very well controlled.  2. History of iliac aneurysm, 1.6cm on recent LE US, stable from previous. He knows to keep BP well controlled.  3. Hyperlipidemia/aortic atherosclerosis, on Lipitor 40mg and Zetia 10mg once daily. Last lipid panel was excellent. He is due for repeat lipids for PCP.  4. Mild AR, on echo in 2021; he has had some shortness of breath, so we will repeat echocardiogram. He is agreeable.  5. Tobacco use, ongoing, but he has cut down. We spent 20-25 minutes discussing importance of quitting, and how to best do this.     BP and lipids are well controlled.  Same medications, which are all renewed x 1 year.  We reviewed CT scan of lung results, and LE US.  Keep follow-up with other providers.     Keep working on positive dietary changes, and cutting down further on smoking.     Follow-up annually, sooner if clinical condition changes.                       [1]   Past Medical History:  Diagnosis Date    Anesthesia     Occasionally wakes up agitated/ anxious    Arthritis     Atypical pigmented skin lesion     Carpal  tunnel syndrome     Bilateral, uncontrolled    Contact dermatitis and other eczema, due to unspecified cause     Chronic, controlled with meds    Elevated PSA, less than 10 ng/ml     Epididymitis     Chronic on left    Esophageal reflux     Loss of height     Loss of 1.5 inches 2009,    Migraine     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     Quit in 2009, then restarted    Mixed hyperlipidemia     Osteoarthrosis involving, or with mention of more than one site, but not specified as generalized, multiple sites     Prediabetes     uncontrolled    Seborrheic keratoses, inflamed     Seizure (HCC) 2013    Seizure is visual changes only, none since medication started in 2013    Short-term memory loss     Snoring     Thrombocytopenia (HCC)     Tinnitus     Chronic with hearing loss    Unspecified vitamin D deficiency    [2]   Past Surgical History:  Procedure Laterality Date    PB RECONSTR TOTAL SHOULDER IMPLANT Left 12/31/2020    Procedure: ARTHROPLASTY, SHOULDER, TOTAL;  Surgeon: Ric Cooper M.D.;  Location: [facility];  Service: Orthopedics    BICEPS TENODESIS Left 12/31/2020    Procedure: TENODESIS, BICEPS;  Surgeon: Ric Cooper M.D.;  Location: [facility];  Service: Orthopedics    KNEE ARTHROPLASTY TOTAL  9/15/2014    Performed by Cesar Abreu M.D. at SURGERY [facility] ORS    KNEE ARTHROPLASTY TOTAL  3/7/2011    right    CARPAL TUNNEL ENDOSCOPIC  12/1/2009    Performed by RONNIE MAURICIO at SURGERY [facility] ORS    CERVICAL DISK AND FUSION ANTERIOR  1/26/2009    Performed by AMINA NERI at [facility] ORS    TOENAIL REMOVAL  2008    bilateral great toes    ARTHROSCOPY, KNEE  1989    right    HEMORRHOIDECTOMY  1980    VASECTOMY  1979   [3]   Allergies  Allergen Reactions    Erythromycin      Severe stomach pain    Latex Rash     .    Other Environmental      pollen   [4]   Outpatient Encounter Medications as of 6/19/2025   Medication Sig Dispense Refill     atorvastatin (LIPITOR) 40 MG Tab Take 1 Tablet by mouth every day. To lower cholesterol and heart disease risk 100 Tablet 3    telmisartan (MICARDIS) 20 MG tablet Take 1 Tablet by mouth every day. to lower blood pressure 100 Tablet 3    ezetimibe (ZETIA) 10 MG Tab Take 1 Tablet by mouth every day. To lower cholesterol and heart disease risk 100 Tablet 3    finasteride (PROSCAR) 5 MG Tab Take 1 Tablet by mouth every day. 90 Tablet 3    mupirocin (BACTROBAN) 2 % Ointment Apply 1 Application topically 3 times a day. 22 g 3    clobetasol (TEMOVATE) 0.05 % Ointment APPLY TOPICALLY TO AFFECTED AREA(S) 2 TIMES A DAY AS NEEDED. APPLY TO KNEES AS NEEDED 60 g 3    omeprazole (PRILOSEC) 20 MG delayed-release capsule TAKE 1 CAPSULE BY MOUTH EVERY DAY 90 Capsule 3    Silodosin 4 MG Cap TAKE 1 CAPSULE BY MOUTH EVERY DAY 90 Capsule 3    traZODone (DESYREL) 100 MG Tab TAKE 1 TABLET BY MOUTH EVERY DAY IN THE EVENING 90 Tablet 3    coenzyme Q-10 30 MG capsule Take 30 mg by mouth every day.      aspirin 81 MG EC tablet Take 1 Tablet by mouth every day.      zonisamide (ZONEGRAN) 100 MG Cap       Cholecalciferol ([employer] VITAMIN D) 2000 UNIT Cap Take 1 Cap by mouth every bedtime. For vitamin D deficiency 100 Cap 3    [DISCONTINUED] atorvastatin (LIPITOR) 40 MG Tab Take 1 Tablet by mouth every day. To lower cholesterol and heart disease risk 100 Tablet 3    [DISCONTINUED] telmisartan (MICARDIS) 20 MG tablet Take 1 Tablet by mouth every day. to lower blood pressure 100 Tablet 3    [DISCONTINUED] ezetimibe (ZETIA) 10 MG Tab Take 1 Tablet by mouth every day. To lower cholesterol and heart disease risk 100 Tablet 3     No facility-administered encounter medications on file as of 6/19/2025.

## 2025-07-07 ENCOUNTER — HOSPITAL ENCOUNTER (OUTPATIENT)
Dept: LAB | Facility: MEDICAL CENTER | Age: 70
End: 2025-07-07
Attending: FAMILY MEDICINE
Payer: MEDICARE

## 2025-07-07 DIAGNOSIS — D69.6 THROMBOCYTOPENIA (HCC): ICD-10-CM

## 2025-07-07 DIAGNOSIS — E78.2 MIXED HYPERLIPIDEMIA: ICD-10-CM

## 2025-07-07 DIAGNOSIS — R73.03 PREDIABETES: ICD-10-CM

## 2025-07-07 DIAGNOSIS — E55.9 VITAMIN D DEFICIENCY: Chronic | ICD-10-CM

## 2025-07-07 DIAGNOSIS — R41.3 MEMORY LOSS: ICD-10-CM

## 2025-07-07 LAB
25(OH)D3 SERPL-MCNC: 52 NG/ML (ref 30–100)
ALBUMIN SERPL BCP-MCNC: 4.1 G/DL (ref 3.2–4.9)
ALBUMIN/GLOB SERPL: 2 G/DL
ALP SERPL-CCNC: 77 U/L (ref 30–99)
ALT SERPL-CCNC: 14 U/L (ref 2–50)
ANION GAP SERPL CALC-SCNC: 8 MMOL/L (ref 7–16)
AST SERPL-CCNC: 17 U/L (ref 12–45)
BASOPHILS # BLD AUTO: 0.6 % (ref 0–1.8)
BASOPHILS # BLD: 0.05 K/UL (ref 0–0.12)
BILIRUB SERPL-MCNC: 0.5 MG/DL (ref 0.1–1.5)
BUN SERPL-MCNC: 17 MG/DL (ref 8–22)
CALCIUM ALBUM COR SERPL-MCNC: 8.8 MG/DL (ref 8.5–10.5)
CALCIUM SERPL-MCNC: 8.9 MG/DL (ref 8.5–10.5)
CHLORIDE SERPL-SCNC: 109 MMOL/L (ref 96–112)
CHOLEST SERPL-MCNC: 90 MG/DL (ref 100–199)
CO2 SERPL-SCNC: 22 MMOL/L (ref 20–33)
CREAT SERPL-MCNC: 1.02 MG/DL (ref 0.5–1.4)
EOSINOPHIL # BLD AUTO: 0.43 K/UL (ref 0–0.51)
EOSINOPHIL NFR BLD: 4.9 % (ref 0–6.9)
ERYTHROCYTE [DISTWIDTH] IN BLOOD BY AUTOMATED COUNT: 45.1 FL (ref 35.9–50)
EST. AVERAGE GLUCOSE BLD GHB EST-MCNC: 117 MG/DL
FASTING STATUS PATIENT QL REPORTED: NORMAL
FOLATE SERPL-MCNC: 5.7 NG/ML
GFR SERPLBLD CREATININE-BSD FMLA CKD-EPI: 79 ML/MIN/1.73 M 2
GLOBULIN SER CALC-MCNC: 2.1 G/DL (ref 1.9–3.5)
GLUCOSE SERPL-MCNC: 98 MG/DL (ref 65–99)
HBA1C MFR BLD: 5.7 % (ref 4–5.6)
HCT VFR BLD AUTO: 42 % (ref 42–52)
HDLC SERPL-MCNC: 40 MG/DL
HGB BLD-MCNC: 13.7 G/DL (ref 14–18)
IMM GRANULOCYTES # BLD AUTO: 0.02 K/UL (ref 0–0.11)
IMM GRANULOCYTES NFR BLD AUTO: 0.2 % (ref 0–0.9)
LDLC SERPL CALC-MCNC: 36 MG/DL
LYMPHOCYTES # BLD AUTO: 1.62 K/UL (ref 1–4.8)
LYMPHOCYTES NFR BLD: 18.4 % (ref 22–41)
MCH RBC QN AUTO: 30.7 PG (ref 27–33)
MCHC RBC AUTO-ENTMCNC: 32.6 G/DL (ref 32.3–36.5)
MCV RBC AUTO: 94.2 FL (ref 81.4–97.8)
MONOCYTES # BLD AUTO: 0.58 K/UL (ref 0–0.85)
MONOCYTES NFR BLD AUTO: 6.6 % (ref 0–13.4)
NEUTROPHILS # BLD AUTO: 6.11 K/UL (ref 1.82–7.42)
NEUTROPHILS NFR BLD: 69.3 % (ref 44–72)
NRBC # BLD AUTO: 0 K/UL
NRBC BLD-RTO: 0 /100 WBC (ref 0–0.2)
PLATELET # BLD AUTO: 100 K/UL (ref 164–446)
PMV BLD AUTO: 12.3 FL (ref 9–12.9)
POTASSIUM SERPL-SCNC: 4 MMOL/L (ref 3.6–5.5)
PROT SERPL-MCNC: 6.2 G/DL (ref 6–8.2)
RBC # BLD AUTO: 4.46 M/UL (ref 4.7–6.1)
SODIUM SERPL-SCNC: 139 MMOL/L (ref 135–145)
TRIGL SERPL-MCNC: 68 MG/DL (ref 0–149)
VIT B12 SERPL-MCNC: 294 PG/ML (ref 211–911)
WBC # BLD AUTO: 8.8 K/UL (ref 4.8–10.8)

## 2025-07-07 PROCEDURE — 85025 COMPLETE CBC W/AUTO DIFF WBC: CPT

## 2025-07-07 PROCEDURE — 82306 VITAMIN D 25 HYDROXY: CPT

## 2025-07-07 PROCEDURE — 80061 LIPID PANEL: CPT

## 2025-07-07 PROCEDURE — 36415 COLL VENOUS BLD VENIPUNCTURE: CPT

## 2025-07-07 PROCEDURE — 84425 ASSAY OF VITAMIN B-1: CPT

## 2025-07-07 PROCEDURE — 80053 COMPREHEN METABOLIC PANEL: CPT

## 2025-07-07 PROCEDURE — 83036 HEMOGLOBIN GLYCOSYLATED A1C: CPT | Mod: GA

## 2025-07-07 PROCEDURE — 82607 VITAMIN B-12: CPT

## 2025-07-07 PROCEDURE — 82746 ASSAY OF FOLIC ACID SERUM: CPT

## 2025-07-09 ENCOUNTER — OFFICE VISIT (OUTPATIENT)
Dept: MEDICAL GROUP | Facility: MEDICAL CENTER | Age: 70
End: 2025-07-09
Payer: MEDICARE

## 2025-07-09 VITALS
OXYGEN SATURATION: 97 % | DIASTOLIC BLOOD PRESSURE: 56 MMHG | TEMPERATURE: 97.6 F | HEIGHT: 69 IN | HEART RATE: 57 BPM | WEIGHT: 185.19 LBS | BODY MASS INDEX: 27.43 KG/M2 | SYSTOLIC BLOOD PRESSURE: 122 MMHG

## 2025-07-09 DIAGNOSIS — D64.9 ANEMIA, UNSPECIFIED TYPE: ICD-10-CM

## 2025-07-09 DIAGNOSIS — R73.03 PREDIABETES: Primary | ICD-10-CM

## 2025-07-09 DIAGNOSIS — E53.8 VITAMIN B12 DEFICIENCY: ICD-10-CM

## 2025-07-09 DIAGNOSIS — Z87.891 SMOKING HX: ICD-10-CM

## 2025-07-09 PROCEDURE — 3074F SYST BP LT 130 MM HG: CPT | Performed by: FAMILY MEDICINE

## 2025-07-09 PROCEDURE — 3078F DIAST BP <80 MM HG: CPT | Performed by: FAMILY MEDICINE

## 2025-07-09 PROCEDURE — 99214 OFFICE O/P EST MOD 30 MIN: CPT | Performed by: FAMILY MEDICINE

## 2025-07-09 RX ORDER — VARENICLINE TARTRATE 0.5 (11)-1
KIT ORAL
Qty: 1 EACH | Refills: 0 | Status: SHIPPED | OUTPATIENT
Start: 2025-07-09 | End: 2025-08-06

## 2025-07-09 ASSESSMENT — ENCOUNTER SYMPTOMS
FEVER: 0
PALPITATIONS: 0
CHILLS: 0

## 2025-07-09 ASSESSMENT — FIBROSIS 4 INDEX: FIB4 SCORE: 3.18

## 2025-07-09 NOTE — PROGRESS NOTES
Verbal consent was acquired by the patient to use [employer] ambient listening note generation during this visit.      Gume was seen today for follow-up.    Diagnoses and all orders for this visit:    Prediabetes  -     Comp Metabolic Panel; Future  -     HEMOGLOBIN A1C; Future    Vitamin B12 deficiency  -     VITAMIN B12; Future    Anemia, unspecified type  -     CBC WITH DIFFERENTIAL; Future    Smoking hx  -     Varenicline Tartrate, Starter, 0.5 MG X 11 & 1 MG X 42 Tablet Therapy Pack; Take 0.5 mg by mouth every day for 3 days, THEN 0.5 mg 2 times a day for 4 days, THEN 1 mg 2 times a day for 21 days.                Assessment & Plan  1. Prediabetes:  Chronic condition, mildly elevated A1c  - Blood glucose levels are slightly elevated, with an A1c of 5.7  - Advised to incorporate oatmeal into breakfast, supplemented with nuts and fruits such as almonds, walnuts, and cherries  - Emphasized the importance of a balanced diet, rich in fruits, vegetables, lean meats, and whole grains, while minimizing processed foods  - Advised to consume multigrain bread instead of white bread and to avoid adding sugar to meals    2. Anemia:  New condition, unstable  - Hemoglobin levels are low  - Questioned about potential bleeding or bruising, which could be a side effect of the aspirin regimen  - A repeat blood test will be conducted in approximately 4 months to monitor hemoglobin levels  - Further investigation will be necessary if there is no improvement to determine the cause of the low hemoglobin    3. Smoking history:  Chronic condition, unstable  - Expressed a desire to quit smoking and requested nicotine tablets  - Informed that nicotine pills are not available through prescription, but over-the-counter options such as patches, gums, and lozenges are available  - Prescription for the generic form of varenicline was provided, with instructions to start with 0.5 mg daily for 3 days, then increase to 0.5 mg twice daily for 4  days, and finally 1 mg twice daily for 21 days  - Advised to contact via [employer] if experiencing any side effects or if the medication does not work    4. Vitamin b12 def:  New condition, unstable  - B12 level is low  - Advised to take over-the-counter B12 supplements at a dose of 100 mcg daily    5. Stable lung nodule  - CT scan of the lungs showed a stable lung nodule with no new nodules  - Follow-up CT scan will be scheduled in one year to monitor the lung nodule    FU in 4 months      Chief complaint::The primary encounter diagnosis was Prediabetes. Diagnoses of Vitamin B12 deficiency, Anemia, unspecified type, and Smoking hx were also pertinent to this visit.      History of Present Illness  The patient is a 70-year-old male who presents for a blood test follow-up.    Dietary Changes  - He has been making dietary changes, such as reducing his sugar intake by not adding sugar to his cereal.  - He is also attempting to limit his bread consumption, although he enjoys peanut butter and jelly sandwiches.  - His diet includes celery with peanut butter.  - He is trying to improve his cooking skills and eat healthier.  - He reports that his current diet may be contributing to his low hemoglobin levels.    Medication and Symptoms  - He continues to take aspirin and reports no presence of blood in his stools or any bleeding.  - He attributes his low hemoglobin levels to a poor diet.    Smoking Cessation  - He is interested in quitting smoking and is seeking advice on how to obtain nicotine tablets.  - He has previously tried nicotine patches and gum but found them unhelpful.  - He has used Chantix in the past, which he found effective, and is considering trying its generic form.    SOCIAL HISTORY  He admits to smoking.      Review of Systems   Constitutional:  Negative for chills and fever.   Cardiovascular:  Negative for chest pain, palpitations and leg swelling.          Medications and Allergies:       Current  "Outpatient Medications:     Varenicline Tartrate (Starter), Take 0.5 mg by mouth every day for 3 days, THEN 0.5 mg 2 times a day for 4 days, THEN 1 mg 2 times a day for 21 days., Taking    atorvastatin, 40 mg, Oral, DAILY    telmisartan, 20 mg, Oral, DAILY    ezetimibe, 10 mg, Oral, DAILY    finasteride, 5 mg, Oral, DAILY    mupirocin, 1 Application, Topical, TID    clobetasol, APPLY TOPICALLY TO AFFECTED AREA(S) 2 TIMES A DAY AS NEEDED. APPLY TO KNEES AS NEEDED    omeprazole, TAKE 1 CAPSULE BY MOUTH EVERY DAY    Silodosin, 1 Capsule, Oral, QDAY    traZODone, 100 mg, Oral, Q EVENING    coenzyme Q-10, 30 mg, Oral, DAILY    aspirin, 81 mg, Oral, DAILY    zonisamide,     Cholecalciferol, 2,000 Units, Oral, QHS     /56 (BP Location: Left arm, Patient Position: Sitting, BP Cuff Size: Adult)   Pulse (!) 57   Temp 36.4 °C (97.6 °F) (Temporal)   Ht 1.746 m (5' 8.75\")   Wt 84 kg (185 lb 3 oz)   SpO2 97% , Body mass index is 27.55 kg/m².      Physical Exam  Constitutional:       Appearance: Normal appearance. He is well-developed and well-groomed.   HENT:      Head: Normocephalic and atraumatic.      Right Ear: External ear normal.      Left Ear: External ear normal.   Eyes:      General:         Right eye: No discharge.         Left eye: No discharge.      Conjunctiva/sclera: Conjunctivae normal.   Cardiovascular:      Rate and Rhythm: Normal rate.   Pulmonary:      Effort: Pulmonary effort is normal. No respiratory distress.   Musculoskeletal:      Cervical back: Neck supple.   Skin:     Findings: No rash.   Neurological:      Mental Status: He is alert.   Psychiatric:         Mood and Affect: Mood and affect normal.         Behavior: Behavior normal.            I reviewed with patient lab results resulted on 7/7/2025.        Please note that this dictation was created using voice recognition software. I have made every reasonable attempt to correct obvious errors, but I expect that there are errors of grammar " and possibly content that I did not discover before finalizing the note.

## 2025-07-10 LAB — VIT B1 BLD-MCNC: 167 NMOL/L (ref 70–180)

## 2025-08-12 ENCOUNTER — RESULTS FOLLOW-UP (OUTPATIENT)
Facility: MEDICAL CENTER | Age: 70
End: 2025-08-12
Payer: MEDICARE

## 2025-08-12 DIAGNOSIS — I35.1 MILD AORTIC INSUFFICIENCY: ICD-10-CM

## 2025-08-12 DIAGNOSIS — I10 PRIMARY HYPERTENSION: ICD-10-CM

## 2025-08-12 DIAGNOSIS — R06.02 SHORTNESS OF BREATH: ICD-10-CM

## (undated) DEVICE — TUBE CONNECTING SUCTION - CLEAR PLASTIC STERILE 72 IN (50EA/CA)

## (undated) DEVICE — SUTURE 2-0 VICRYL PLUS CT-1 36 (36PK/BX)"

## (undated) DEVICE — CANNULA CRYSTAL 5.75MM X 7CM PART THD (5/BX)

## (undated) DEVICE — SUTURE PRELOADED #2 ULTRABRAID COBRAID (10EA/BX)

## (undated) DEVICE — SENSOR SPO2 NEO LNCS ADHESIVE (20/BX) SEE USER NOTES

## (undated) DEVICE — SUTURE 3-0 ETHILON FS-1 - (36/BX) 30 INCH

## (undated) DEVICE — TIP INTPLS HFLO ML ORFC BTRY - (12/CS)  FOR SURGILAV

## (undated) DEVICE — ARTHROWAND TURBOVAC 3.5/90 SCT

## (undated) DEVICE — TUBING PUMP WITH CONNECTOR REDEUCE (1EA)

## (undated) DEVICE — FIBERWIRE 2.0 (12EA/BX)

## (undated) DEVICE — DRAPE LARGE 3 QUARTER - (20/CA)

## (undated) DEVICE — GLOVE BIOGEL INDICATOR SZ 8 SURGICAL PF LTX - (50/BX 4BX/CA)

## (undated) DEVICE — BAG SPONGE COUNT 10.25 X 32 - BLUE (250/CA)

## (undated) DEVICE — CANISTER SUCTION RIGID RED 1500CC (40EA/CA)

## (undated) DEVICE — MASK ANESTHESIA ADULT  - (100/CA)

## (undated) DEVICE — LACTATED RINGERS INJ 1000 ML - (14EA/CA 60CA/PF)

## (undated) DEVICE — SUCTION INSTRUMENT YANKAUER BULBOUS TIP W/O VENT (50EA/CA)

## (undated) DEVICE — GLOVE BIOGEL SZ 7.5 SURGICAL PF LTX - (50PR/BX 4BX/CA)

## (undated) DEVICE — SODIUM CHL. IRRIGATION 0.9% 3000ML (4EA/CA 65CA/PF)

## (undated) DEVICE — PAD UNIVERSAL MULTI USE (1/EA)

## (undated) DEVICE — SODIUM CHL IRRIGATION 0.9% 1000ML (12EA/CA)

## (undated) DEVICE — KIT EVACUATER 3 SPRING PVC LF 1/8 DRAIN SIZE (10EA/CA)"

## (undated) DEVICE — SLEEVE SHOULDER DISP(ARTHREX) - (6/BX)

## (undated) DEVICE — ELECTRODE DUAL RETURN W/ CORD - (50/PK)

## (undated) DEVICE — PROTECTOR ULNA NERVE - (36PR/CA)

## (undated) DEVICE — BLADE SAGITTAL SYSTEM 18MM

## (undated) DEVICE — PACK SHOULDER ARTHROSCOPY SM - (2EA/CA)

## (undated) DEVICE — SHAVER4.0 AGGRESSIVE + FORMLA (5EA/BX)

## (undated) DEVICE — TUBING PATIENT W/CONNECTOR REDEUCE (1EA)

## (undated) DEVICE — DRAPETIBURON SHOULDER W/POUCH - (5EA/CA)

## (undated) DEVICE — WATER IRRIGATION STERILE 1000ML (12EA/CA)

## (undated) DEVICE — GOWN WARMING STANDARD FLEX - (30/CA)

## (undated) DEVICE — PACK TOTAL HIP - (1/CA)

## (undated) DEVICE — NEPTUNE 4 PORT MANIFOLD - (20/PK)

## (undated) DEVICE — SHAVER, 5.5 RESECTOR

## (undated) DEVICE — HUMID-VENT HEAT AND MOISTURE EXCHANGE- (50/BX)

## (undated) DEVICE — KIT ANESTHESIA W/CIRCUIT & 3/LT BAG W/FILTER (20EA/CA)

## (undated) DEVICE — HEAD HOLDER JUNIOR/ADULT

## (undated) DEVICE — DRAPE IOBAN II INCISE 23X17 - (10EA/BX 4BX/CA)

## (undated) DEVICE — SYRINGE 30 ML LL (56/BX)

## (undated) DEVICE — SPIDER SHOULDER HOLDER (12EA/BX)

## (undated) DEVICE — CHLORAPREP 26 ML APPLICATOR - ORANGE TINT(25/CA)

## (undated) DEVICE — DRESSING AQUACEL AG ADVANTAGE 3.5 X 10" (10EA/BX)"

## (undated) DEVICE — SUTURE GENERAL

## (undated) DEVICE — ELECTRODE 850 FOAM ADHESIVE - HYDROGEL RADIOTRNSPRNT (50/PK)

## (undated) DEVICE — GLOVE, LITE (PAIR)

## (undated) DEVICE — NEEDLE SAFETY 18 GA X 1 1/2 IN (100EA/BX)

## (undated) DEVICE — NEEDLE DAVIS TONSIL 1/2 CIR - (2EA/PK20PK/BX)

## (undated) DEVICE — HANDPIECE 10FT INTPLS SCT PLS IRRIGATION HAND CONTROL SET (6/PK)